# Patient Record
Sex: MALE | Race: WHITE | NOT HISPANIC OR LATINO | Employment: OTHER | URBAN - METROPOLITAN AREA
[De-identification: names, ages, dates, MRNs, and addresses within clinical notes are randomized per-mention and may not be internally consistent; named-entity substitution may affect disease eponyms.]

---

## 2017-01-20 ENCOUNTER — RECORDS - HEALTHEAST (OUTPATIENT)
Dept: LAB | Facility: CLINIC | Age: 82
End: 2017-01-20

## 2017-01-20 LAB
CHOLEST SERPL-MCNC: 153 MG/DL
FASTING STATUS PATIENT QL REPORTED: YES
HDLC SERPL-MCNC: 43 MG/DL
LDLC SERPL CALC-MCNC: 77 MG/DL
PSA SERPL-MCNC: 1.8 NG/ML (ref 0–6.5)
TRIGL SERPL-MCNC: 164 MG/DL

## 2018-07-27 ENCOUNTER — RECORDS - HEALTHEAST (OUTPATIENT)
Dept: LAB | Facility: CLINIC | Age: 83
End: 2018-07-27

## 2018-07-27 LAB
ALBUMIN SERPL-MCNC: 3.8 G/DL (ref 3.5–5)
ALP SERPL-CCNC: 51 U/L (ref 45–120)
ALT SERPL W P-5'-P-CCNC: 14 U/L (ref 0–45)
ANION GAP SERPL CALCULATED.3IONS-SCNC: 6 MMOL/L (ref 5–18)
AST SERPL W P-5'-P-CCNC: 17 U/L (ref 0–40)
BASOPHILS # BLD AUTO: 0 THOU/UL (ref 0–0.2)
BASOPHILS NFR BLD AUTO: 1 % (ref 0–2)
BILIRUB SERPL-MCNC: 0.6 MG/DL (ref 0–1)
BUN SERPL-MCNC: 32 MG/DL (ref 8–28)
CALCIUM SERPL-MCNC: 9.5 MG/DL (ref 8.5–10.5)
CHLORIDE BLD-SCNC: 112 MMOL/L (ref 98–107)
CO2 SERPL-SCNC: 24 MMOL/L (ref 22–31)
CREAT SERPL-MCNC: 1.29 MG/DL (ref 0.7–1.3)
EOSINOPHIL # BLD AUTO: 0.2 THOU/UL (ref 0–0.4)
EOSINOPHIL NFR BLD AUTO: 2 % (ref 0–6)
ERYTHROCYTE [DISTWIDTH] IN BLOOD BY AUTOMATED COUNT: 13.2 % (ref 11–14.5)
GFR SERPL CREATININE-BSD FRML MDRD: 53 ML/MIN/1.73M2
GLUCOSE BLD-MCNC: 121 MG/DL (ref 70–125)
HCT VFR BLD AUTO: 33.2 % (ref 40–54)
HGB BLD-MCNC: 11.3 G/DL (ref 14–18)
LYMPHOCYTES # BLD AUTO: 1.3 THOU/UL (ref 0.8–4.4)
LYMPHOCYTES NFR BLD AUTO: 21 % (ref 20–40)
MCH RBC QN AUTO: 32.9 PG (ref 27–34)
MCHC RBC AUTO-ENTMCNC: 34 G/DL (ref 32–36)
MCV RBC AUTO: 97 FL (ref 80–100)
MONOCYTES # BLD AUTO: 0.7 THOU/UL (ref 0–0.9)
MONOCYTES NFR BLD AUTO: 10 % (ref 2–10)
NEUTROPHILS # BLD AUTO: 4.3 THOU/UL (ref 2–7.7)
NEUTROPHILS NFR BLD AUTO: 67 % (ref 50–70)
PLATELET # BLD AUTO: 140 THOU/UL (ref 140–440)
PMV BLD AUTO: 11.9 FL (ref 8.5–12.5)
POTASSIUM BLD-SCNC: 5.3 MMOL/L (ref 3.5–5)
PROT SERPL-MCNC: 6.1 G/DL (ref 6–8)
RBC # BLD AUTO: 3.43 MILL/UL (ref 4.4–6.2)
SODIUM SERPL-SCNC: 142 MMOL/L (ref 136–145)
WBC: 6.5 THOU/UL (ref 4–11)

## 2018-07-29 LAB — HBA1C MFR BLD: 7 % (ref 4.2–6.1)

## 2018-07-31 LAB
CHOLEST SERPL-MCNC: 119 MG/DL
FASTING STATUS PATIENT QL REPORTED: YES
HDLC SERPL-MCNC: 40 MG/DL
LDLC SERPL CALC-MCNC: 57 MG/DL
TRIGL SERPL-MCNC: 108 MG/DL

## 2018-08-16 ENCOUNTER — RECORDS - HEALTHEAST (OUTPATIENT)
Dept: LAB | Facility: CLINIC | Age: 83
End: 2018-08-16

## 2018-08-17 LAB
ANION GAP SERPL CALCULATED.3IONS-SCNC: 14 MMOL/L (ref 5–18)
BUN SERPL-MCNC: 55 MG/DL (ref 8–28)
CALCIUM SERPL-MCNC: 9.4 MG/DL (ref 8.5–10.5)
CHLORIDE BLD-SCNC: 110 MMOL/L (ref 98–107)
CO2 SERPL-SCNC: 22 MMOL/L (ref 22–31)
CREAT SERPL-MCNC: 1.63 MG/DL (ref 0.7–1.3)
GFR SERPL CREATININE-BSD FRML MDRD: 40 ML/MIN/1.73M2
GLUCOSE BLD-MCNC: 211 MG/DL (ref 70–125)
POTASSIUM BLD-SCNC: 4.1 MMOL/L (ref 3.5–5)
SODIUM SERPL-SCNC: 146 MMOL/L (ref 136–145)

## 2018-08-20 ENCOUNTER — RECORDS - HEALTHEAST (OUTPATIENT)
Dept: LAB | Facility: CLINIC | Age: 83
End: 2018-08-20

## 2018-08-20 LAB
ANION GAP SERPL CALCULATED.3IONS-SCNC: 10 MMOL/L (ref 5–18)
BUN SERPL-MCNC: 37 MG/DL (ref 8–28)
CALCIUM SERPL-MCNC: 8.7 MG/DL (ref 8.5–10.5)
CHLORIDE BLD-SCNC: 110 MMOL/L (ref 98–107)
CO2 SERPL-SCNC: 20 MMOL/L (ref 22–31)
CREAT SERPL-MCNC: 1.23 MG/DL (ref 0.7–1.3)
ERYTHROCYTE [DISTWIDTH] IN BLOOD BY AUTOMATED COUNT: 16.1 % (ref 11–14.5)
GFR SERPL CREATININE-BSD FRML MDRD: 56 ML/MIN/1.73M2
GLUCOSE BLD-MCNC: 249 MG/DL (ref 70–125)
HCT VFR BLD AUTO: 27.4 % (ref 40–54)
HGB BLD-MCNC: 9.1 G/DL (ref 14–18)
MCH RBC QN AUTO: 32.2 PG (ref 27–34)
MCHC RBC AUTO-ENTMCNC: 33.2 G/DL (ref 32–36)
MCV RBC AUTO: 97 FL (ref 80–100)
PLATELET # BLD AUTO: 155 THOU/UL (ref 140–440)
PMV BLD AUTO: 12 FL (ref 8.5–12.5)
POTASSIUM BLD-SCNC: 4.6 MMOL/L (ref 3.5–5)
RBC # BLD AUTO: 2.83 MILL/UL (ref 4.4–6.2)
SODIUM SERPL-SCNC: 140 MMOL/L (ref 136–145)
WBC: 11.5 THOU/UL (ref 4–11)

## 2018-08-24 ENCOUNTER — RECORDS - HEALTHEAST (OUTPATIENT)
Dept: LAB | Facility: CLINIC | Age: 83
End: 2018-08-24

## 2018-08-24 LAB
ALBUMIN SERPL-MCNC: 3.3 G/DL (ref 3.5–5)
ALP SERPL-CCNC: 67 U/L (ref 45–120)
ALT SERPL W P-5'-P-CCNC: 31 U/L (ref 0–45)
ANION GAP SERPL CALCULATED.3IONS-SCNC: 9 MMOL/L (ref 5–18)
AST SERPL W P-5'-P-CCNC: 18 U/L (ref 0–40)
BASOPHILS # BLD AUTO: 0 THOU/UL (ref 0–0.2)
BASOPHILS NFR BLD AUTO: 0 % (ref 0–2)
BILIRUB SERPL-MCNC: 1.4 MG/DL (ref 0–1)
BUN SERPL-MCNC: 37 MG/DL (ref 8–28)
CALCIUM SERPL-MCNC: 8.9 MG/DL (ref 8.5–10.5)
CHLORIDE BLD-SCNC: 108 MMOL/L (ref 98–107)
CO2 SERPL-SCNC: 22 MMOL/L (ref 22–31)
CREAT SERPL-MCNC: 1.43 MG/DL (ref 0.7–1.3)
EOSINOPHIL # BLD AUTO: 0.2 THOU/UL (ref 0–0.4)
EOSINOPHIL NFR BLD AUTO: 2 % (ref 0–6)
ERYTHROCYTE [DISTWIDTH] IN BLOOD BY AUTOMATED COUNT: 17.2 % (ref 11–14.5)
GFR SERPL CREATININE-BSD FRML MDRD: 47 ML/MIN/1.73M2
GLUCOSE BLD-MCNC: 212 MG/DL (ref 70–125)
HCT VFR BLD AUTO: 28.7 % (ref 40–54)
HGB BLD-MCNC: 9.2 G/DL (ref 14–18)
LYMPHOCYTES # BLD AUTO: 1.4 THOU/UL (ref 0.8–4.4)
LYMPHOCYTES NFR BLD AUTO: 15 % (ref 20–40)
MCH RBC QN AUTO: 31.8 PG (ref 27–34)
MCHC RBC AUTO-ENTMCNC: 32.1 G/DL (ref 32–36)
MCV RBC AUTO: 99 FL (ref 80–100)
MONOCYTES # BLD AUTO: 0.8 THOU/UL (ref 0–0.9)
MONOCYTES NFR BLD AUTO: 9 % (ref 2–10)
NEUTROPHILS # BLD AUTO: 6.6 THOU/UL (ref 2–7.7)
NEUTROPHILS NFR BLD AUTO: 73 % (ref 50–70)
PLATELET # BLD AUTO: 173 THOU/UL (ref 140–440)
PMV BLD AUTO: 12 FL (ref 8.5–12.5)
POTASSIUM BLD-SCNC: 4.4 MMOL/L (ref 3.5–5)
PROT SERPL-MCNC: 5.5 G/DL (ref 6–8)
RBC # BLD AUTO: 2.89 MILL/UL (ref 4.4–6.2)
SODIUM SERPL-SCNC: 139 MMOL/L (ref 136–145)
WBC: 9.1 THOU/UL (ref 4–11)

## 2018-09-12 ENCOUNTER — RECORDS - HEALTHEAST (OUTPATIENT)
Dept: LAB | Facility: CLINIC | Age: 83
End: 2018-09-12

## 2018-09-12 LAB
ALBUMIN SERPL-MCNC: 3.3 G/DL (ref 3.5–5)
ALP SERPL-CCNC: 58 U/L (ref 45–120)
ALT SERPL W P-5'-P-CCNC: 14 U/L (ref 0–45)
ANION GAP SERPL CALCULATED.3IONS-SCNC: 10 MMOL/L (ref 5–18)
AST SERPL W P-5'-P-CCNC: 16 U/L (ref 0–40)
BILIRUB SERPL-MCNC: 0.7 MG/DL (ref 0–1)
BUN SERPL-MCNC: 29 MG/DL (ref 8–28)
CALCIUM SERPL-MCNC: 8.9 MG/DL (ref 8.5–10.5)
CHLORIDE BLD-SCNC: 109 MMOL/L (ref 98–107)
CO2 SERPL-SCNC: 21 MMOL/L (ref 22–31)
CREAT SERPL-MCNC: 1.17 MG/DL (ref 0.7–1.3)
GFR SERPL CREATININE-BSD FRML MDRD: 59 ML/MIN/1.73M2
GLUCOSE BLD-MCNC: 126 MG/DL (ref 70–125)
HGB BLD-MCNC: 10.1 G/DL (ref 14–18)
POTASSIUM BLD-SCNC: 4.2 MMOL/L (ref 3.5–5)
PROT SERPL-MCNC: 5.6 G/DL (ref 6–8)
SODIUM SERPL-SCNC: 140 MMOL/L (ref 136–145)

## 2018-12-26 ENCOUNTER — RECORDS - HEALTHEAST (OUTPATIENT)
Dept: LAB | Facility: CLINIC | Age: 83
End: 2018-12-26

## 2018-12-26 LAB
ALBUMIN SERPL-MCNC: 3.6 G/DL (ref 3.5–5)
ALP SERPL-CCNC: 48 U/L (ref 45–120)
ALT SERPL W P-5'-P-CCNC: 18 U/L (ref 0–45)
ANION GAP SERPL CALCULATED.3IONS-SCNC: 7 MMOL/L (ref 5–18)
AST SERPL W P-5'-P-CCNC: 18 U/L (ref 0–40)
BASOPHILS # BLD AUTO: 0 THOU/UL (ref 0–0.2)
BASOPHILS NFR BLD AUTO: 0 % (ref 0–2)
BILIRUB SERPL-MCNC: 0.6 MG/DL (ref 0–1)
BUN SERPL-MCNC: 38 MG/DL (ref 8–28)
CALCIUM SERPL-MCNC: 9.3 MG/DL (ref 8.5–10.5)
CHLORIDE BLD-SCNC: 111 MMOL/L (ref 98–107)
CO2 SERPL-SCNC: 23 MMOL/L (ref 22–31)
CREAT SERPL-MCNC: 1.34 MG/DL (ref 0.7–1.3)
EOSINOPHIL # BLD AUTO: 0.1 THOU/UL (ref 0–0.4)
EOSINOPHIL NFR BLD AUTO: 2 % (ref 0–6)
ERYTHROCYTE [DISTWIDTH] IN BLOOD BY AUTOMATED COUNT: 12.8 % (ref 11–14.5)
GFR SERPL CREATININE-BSD FRML MDRD: 51 ML/MIN/1.73M2
GLUCOSE BLD-MCNC: 189 MG/DL (ref 70–125)
HCT VFR BLD AUTO: 34.5 % (ref 40–54)
HGB BLD-MCNC: 10.8 G/DL (ref 14–18)
LYMPHOCYTES # BLD AUTO: 1.3 THOU/UL (ref 0.8–4.4)
LYMPHOCYTES NFR BLD AUTO: 23 % (ref 20–40)
MCH RBC QN AUTO: 31.3 PG (ref 27–34)
MCHC RBC AUTO-ENTMCNC: 31.3 G/DL (ref 32–36)
MCV RBC AUTO: 100 FL (ref 80–100)
MONOCYTES # BLD AUTO: 0.5 THOU/UL (ref 0–0.9)
MONOCYTES NFR BLD AUTO: 9 % (ref 2–10)
NEUTROPHILS # BLD AUTO: 3.8 THOU/UL (ref 2–7.7)
NEUTROPHILS NFR BLD AUTO: 66 % (ref 50–70)
PLATELET # BLD AUTO: 131 THOU/UL (ref 140–440)
PMV BLD AUTO: 12.3 FL (ref 8.5–12.5)
POTASSIUM BLD-SCNC: 4.9 MMOL/L (ref 3.5–5)
PROT SERPL-MCNC: 5.7 G/DL (ref 6–8)
RBC # BLD AUTO: 3.45 MILL/UL (ref 4.4–6.2)
SODIUM SERPL-SCNC: 141 MMOL/L (ref 136–145)
WBC: 5.9 THOU/UL (ref 4–11)

## 2018-12-27 LAB — HBA1C MFR BLD: 7.5 % (ref 4.2–6.1)

## 2019-01-03 ENCOUNTER — ANESTHESIA - HEALTHEAST (OUTPATIENT)
Dept: SURGERY | Facility: CLINIC | Age: 84
End: 2019-01-03

## 2019-01-03 ENCOUNTER — SURGERY - HEALTHEAST (OUTPATIENT)
Dept: SURGERY | Facility: CLINIC | Age: 84
End: 2019-01-03

## 2019-01-03 ASSESSMENT — MIFFLIN-ST. JEOR: SCORE: 1424.22

## 2019-09-10 ENCOUNTER — RECORDS - HEALTHEAST (OUTPATIENT)
Dept: LAB | Facility: CLINIC | Age: 84
End: 2019-09-10

## 2019-09-10 LAB
ANION GAP SERPL CALCULATED.3IONS-SCNC: 9 MMOL/L (ref 5–18)
BUN SERPL-MCNC: 41 MG/DL (ref 8–28)
CALCIUM SERPL-MCNC: 9.3 MG/DL (ref 8.5–10.5)
CHLORIDE BLD-SCNC: 109 MMOL/L (ref 98–107)
CO2 SERPL-SCNC: 26 MMOL/L (ref 22–31)
CREAT SERPL-MCNC: 1.5 MG/DL (ref 0.7–1.3)
GFR SERPL CREATININE-BSD FRML MDRD: 44 ML/MIN/1.73M2
GLUCOSE BLD-MCNC: 69 MG/DL (ref 70–125)
POTASSIUM BLD-SCNC: 4.5 MMOL/L (ref 3.5–5)
SODIUM SERPL-SCNC: 144 MMOL/L (ref 136–145)

## 2020-03-13 ENCOUNTER — RECORDS - HEALTHEAST (OUTPATIENT)
Dept: LAB | Facility: CLINIC | Age: 85
End: 2020-03-13

## 2020-03-13 LAB
ANION GAP SERPL CALCULATED.3IONS-SCNC: 6 MMOL/L (ref 5–18)
BUN SERPL-MCNC: 73 MG/DL (ref 8–28)
CALCIUM SERPL-MCNC: 8.9 MG/DL (ref 8.5–10.5)
CHLORIDE BLD-SCNC: 112 MMOL/L (ref 98–107)
CO2 SERPL-SCNC: 22 MMOL/L (ref 22–31)
CREAT SERPL-MCNC: 1.77 MG/DL (ref 0.7–1.3)
GFR SERPL CREATININE-BSD FRML MDRD: 36 ML/MIN/1.73M2
GLUCOSE BLD-MCNC: 52 MG/DL (ref 70–125)
POTASSIUM BLD-SCNC: 5 MMOL/L (ref 3.5–5)
SODIUM SERPL-SCNC: 140 MMOL/L (ref 136–145)

## 2020-05-11 ENCOUNTER — RECORDS - HEALTHEAST (OUTPATIENT)
Dept: LAB | Facility: CLINIC | Age: 85
End: 2020-05-11

## 2020-05-11 LAB
ANION GAP SERPL CALCULATED.3IONS-SCNC: 9 MMOL/L (ref 5–18)
BUN SERPL-MCNC: 41 MG/DL (ref 8–28)
CALCIUM SERPL-MCNC: 9.3 MG/DL (ref 8.5–10.5)
CHLORIDE BLD-SCNC: 108 MMOL/L (ref 98–107)
CHOLEST SERPL-MCNC: 112 MG/DL
CO2 SERPL-SCNC: 28 MMOL/L (ref 22–31)
CREAT SERPL-MCNC: 1.4 MG/DL (ref 0.7–1.3)
FASTING STATUS PATIENT QL REPORTED: NORMAL
GFR SERPL CREATININE-BSD FRML MDRD: 48 ML/MIN/1.73M2
GLUCOSE BLD-MCNC: 43 MG/DL (ref 70–125)
HDLC SERPL-MCNC: 43 MG/DL
LDLC SERPL CALC-MCNC: 48 MG/DL
POTASSIUM BLD-SCNC: 4.3 MMOL/L (ref 3.5–5)
SODIUM SERPL-SCNC: 145 MMOL/L (ref 136–145)
TRIGL SERPL-MCNC: 103 MG/DL

## 2020-08-12 ENCOUNTER — RECORDS - HEALTHEAST (OUTPATIENT)
Dept: LAB | Facility: CLINIC | Age: 85
End: 2020-08-12

## 2020-08-12 LAB
ANION GAP SERPL CALCULATED.3IONS-SCNC: 9 MMOL/L (ref 5–18)
BUN SERPL-MCNC: 55 MG/DL (ref 8–28)
CALCIUM SERPL-MCNC: 8.9 MG/DL (ref 8.5–10.5)
CHLORIDE BLD-SCNC: 106 MMOL/L (ref 98–107)
CO2 SERPL-SCNC: 27 MMOL/L (ref 22–31)
CREAT SERPL-MCNC: 1.74 MG/DL (ref 0.7–1.3)
ERYTHROCYTE [DISTWIDTH] IN BLOOD BY AUTOMATED COUNT: 13.2 % (ref 11–14.5)
GFR SERPL CREATININE-BSD FRML MDRD: 37 ML/MIN/1.73M2
GLUCOSE BLD-MCNC: 205 MG/DL (ref 70–125)
HCT VFR BLD AUTO: 34 % (ref 40–54)
HGB BLD-MCNC: 11 G/DL (ref 14–18)
MCH RBC QN AUTO: 32.4 PG (ref 27–34)
MCHC RBC AUTO-ENTMCNC: 32.4 G/DL (ref 32–36)
MCV RBC AUTO: 100 FL (ref 80–100)
PLATELET # BLD AUTO: 146 THOU/UL (ref 140–440)
PMV BLD AUTO: 11.6 FL (ref 8.5–12.5)
POTASSIUM BLD-SCNC: 5.2 MMOL/L (ref 3.5–5)
RBC # BLD AUTO: 3.4 MILL/UL (ref 4.4–6.2)
SODIUM SERPL-SCNC: 142 MMOL/L (ref 136–145)
TSH SERPL DL<=0.005 MIU/L-ACNC: 1.17 UIU/ML (ref 0.3–5)
WBC: 7 THOU/UL (ref 4–11)

## 2021-02-02 ENCOUNTER — RECORDS - HEALTHEAST (OUTPATIENT)
Dept: LAB | Facility: CLINIC | Age: 86
End: 2021-02-02

## 2021-02-02 LAB
ALBUMIN SERPL-MCNC: 3.7 G/DL (ref 3.5–5)
ALP SERPL-CCNC: 50 U/L (ref 45–120)
ALT SERPL W P-5'-P-CCNC: 31 U/L (ref 0–45)
ANION GAP SERPL CALCULATED.3IONS-SCNC: 10 MMOL/L (ref 5–18)
AST SERPL W P-5'-P-CCNC: 34 U/L (ref 0–40)
BILIRUB SERPL-MCNC: 0.6 MG/DL (ref 0–1)
BUN SERPL-MCNC: 53 MG/DL (ref 8–28)
CALCIUM SERPL-MCNC: 9.1 MG/DL (ref 8.5–10.5)
CHLORIDE BLD-SCNC: 107 MMOL/L (ref 98–107)
CO2 SERPL-SCNC: 25 MMOL/L (ref 22–31)
CREAT SERPL-MCNC: 1.38 MG/DL (ref 0.7–1.3)
GFR SERPL CREATININE-BSD FRML MDRD: 49 ML/MIN/1.73M2
GLUCOSE BLD-MCNC: 111 MG/DL (ref 70–125)
POTASSIUM BLD-SCNC: 4.7 MMOL/L (ref 3.5–5)
PROT SERPL-MCNC: 6 G/DL (ref 6–8)
PSA SERPL-MCNC: 1.4 NG/ML (ref 0–6.5)
SODIUM SERPL-SCNC: 142 MMOL/L (ref 136–145)
TSH SERPL DL<=0.005 MIU/L-ACNC: 1.33 UIU/ML (ref 0.3–5)

## 2021-05-29 ENCOUNTER — RECORDS - HEALTHEAST (OUTPATIENT)
Dept: ADMINISTRATIVE | Facility: CLINIC | Age: 86
End: 2021-05-29

## 2021-05-30 ENCOUNTER — RECORDS - HEALTHEAST (OUTPATIENT)
Dept: ADMINISTRATIVE | Facility: CLINIC | Age: 86
End: 2021-05-30

## 2021-06-02 ENCOUNTER — RECORDS - HEALTHEAST (OUTPATIENT)
Dept: LAB | Facility: CLINIC | Age: 86
End: 2021-06-02

## 2021-06-02 ENCOUNTER — RECORDS - HEALTHEAST (OUTPATIENT)
Dept: ADMINISTRATIVE | Facility: CLINIC | Age: 86
End: 2021-06-02

## 2021-06-02 VITALS — WEIGHT: 180 LBS | HEIGHT: 66 IN | BODY MASS INDEX: 28.93 KG/M2

## 2021-06-02 LAB
ANION GAP SERPL CALCULATED.3IONS-SCNC: 9 MMOL/L (ref 5–18)
BUN SERPL-MCNC: 63 MG/DL (ref 8–28)
CALCIUM SERPL-MCNC: 9.4 MG/DL (ref 8.5–10.5)
CHLORIDE BLD-SCNC: 105 MMOL/L (ref 98–107)
CHOLEST SERPL-MCNC: 98 MG/DL
CO2 SERPL-SCNC: 27 MMOL/L (ref 22–31)
CREAT SERPL-MCNC: 1.47 MG/DL (ref 0.7–1.3)
FASTING STATUS PATIENT QL REPORTED: ABNORMAL
GFR SERPL CREATININE-BSD FRML MDRD: 45 ML/MIN/1.73M2
GLUCOSE BLD-MCNC: 150 MG/DL (ref 70–125)
HDLC SERPL-MCNC: 39 MG/DL
IRON SERPL-MCNC: 95 UG/DL (ref 42–175)
LDLC SERPL CALC-MCNC: 42 MG/DL
POTASSIUM BLD-SCNC: 4.5 MMOL/L (ref 3.5–5)
RETICS # AUTO: 0.07 MILL/UL (ref 0.01–0.11)
RETICS/RBC NFR AUTO: 1.88 % (ref 0.8–2.7)
SODIUM SERPL-SCNC: 141 MMOL/L (ref 136–145)
TRIGL SERPL-MCNC: 86 MG/DL

## 2021-06-04 ENCOUNTER — RECORDS - HEALTHEAST (OUTPATIENT)
Dept: LAB | Facility: CLINIC | Age: 86
End: 2021-06-04

## 2021-06-04 LAB
FERRITIN SERPL-MCNC: 169 NG/ML (ref 27–300)
VIT B12 SERPL-MCNC: 777 PG/ML (ref 213–816)

## 2021-06-22 NOTE — ANESTHESIA CARE TRANSFER NOTE
Last vitals:   Vitals:    01/03/19 1322   BP: 117/86   Pulse: 88   Resp: 15   Temp: 36.4  C (97.6  F)   SpO2: 93%     Patient's level of consciousness is drowsy  Spontaneous respirations: yes  Maintains airway independently: yes  Dentition unchanged: yes  Oropharynx: oropharynx clear of all foreign objects    QCDR Measures:  ASA# 20 - Surgical Safety Checklist: WHO surgical safety checklist completed prior to induction    PQRS# 430 - Adult PONV Prevention: NA - Not adult patient, not GA or 3 or more risk factors NOT present  ASA# 8 - Peds PONV Prevention: NA - Not pediatric patient, not GA or 2 or more risk factors NOT present  PQRS# 424 - Casandra-op Temp Management: 4559F - At least one body temp DOCUMENTED => 35.5C or 95.9F within required timeframe  PQRS# 426 - PACU Transfer Protocol: - Transfer of care checklist used  ASA# 14 - Acute Post-op Pain: ASA14B - Patient did NOT experience pain >= 7 out of 10

## 2021-06-22 NOTE — ANESTHESIA POSTPROCEDURE EVALUATION
Patient: Barak Berry  COLONOSCOPY  Anesthesia type: MAC    Patient location: Phase II Recovery  Last vitals:   Vitals:    01/03/19 1400   BP: 161/69   Pulse: 84   Resp: 16   Temp:    SpO2: 93%     Post vital signs: stable  Level of consciousness: awake and responds to simple questions  Post-anesthesia pain: pain controlled  Post-anesthesia nausea and vomiting: no  Pulmonary: unassisted, return to baseline  Cardiovascular: stable and blood pressure at baseline  Hydration: adequate  Anesthetic events: no    QCDR Measures:  ASA# 11 - Casandra-op Cardiac Arrest: ASA11B - Patient did NOT experience unanticipated cardiac arrest  ASA# 12 - Casandra-op Mortality Rate: ASA12B - Patient did NOT die  ASA# 13 - PACU Re-Intubation Rate: ASA13B - Patient did NOT require a new airway mgmt  ASA# 10 - Composite Anes Safety: ASA10A - No serious adverse event    Additional Notes:

## 2021-06-22 NOTE — ANESTHESIA PREPROCEDURE EVALUATION
Anesthesia Evaluation      Patient summary reviewed   History of anesthetic complications     Airway   Mallampati: III  Neck ROM: full   Pulmonary - normal exam                          Cardiovascular - normal exam  (+) hypertension, CAD, ,      Neuro/Psych      Endo/Other    (+) diabetes mellitus,      GI/Hepatic/Renal    (+) GERD,   chronic renal disease,           Dental - normal exam                        Anesthesia Plan  Planned anesthetic: MAC    ASA 2     Anesthetic plan and risks discussed with: patient    Post-op plan: routine recovery

## 2021-08-20 ENCOUNTER — LAB REQUISITION (OUTPATIENT)
Dept: LAB | Facility: CLINIC | Age: 86
End: 2021-08-20
Payer: MEDICARE

## 2021-08-20 DIAGNOSIS — Z01.818 ENCOUNTER FOR OTHER PREPROCEDURAL EXAMINATION: ICD-10-CM

## 2021-08-20 DIAGNOSIS — I12.9 HYPERTENSIVE CHRONIC KIDNEY DISEASE WITH STAGE 1 THROUGH STAGE 4 CHRONIC KIDNEY DISEASE, OR UNSPECIFIED CHRONIC KIDNEY DISEASE: ICD-10-CM

## 2021-08-20 PROCEDURE — 80048 BASIC METABOLIC PNL TOTAL CA: CPT | Mod: ORL | Performed by: FAMILY MEDICINE

## 2021-08-20 PROCEDURE — U0005 INFEC AGEN DETEC AMPLI PROBE: HCPCS | Mod: ORL | Performed by: FAMILY MEDICINE

## 2021-08-21 LAB
ANION GAP SERPL CALCULATED.3IONS-SCNC: 8 MMOL/L (ref 5–18)
BUN SERPL-MCNC: 55 MG/DL (ref 8–28)
CALCIUM SERPL-MCNC: 9.2 MG/DL (ref 8.5–10.5)
CHLORIDE BLD-SCNC: 106 MMOL/L (ref 98–107)
CO2 SERPL-SCNC: 26 MMOL/L (ref 22–31)
CREAT SERPL-MCNC: 1.6 MG/DL (ref 0.7–1.3)
GFR SERPL CREATININE-BSD FRML MDRD: 38 ML/MIN/1.73M2
GLUCOSE BLD-MCNC: 190 MG/DL (ref 70–125)
POTASSIUM BLD-SCNC: 4.1 MMOL/L (ref 3.5–5)
SODIUM SERPL-SCNC: 140 MMOL/L (ref 136–145)

## 2021-08-22 LAB — SARS-COV-2 RNA RESP QL NAA+PROBE: NEGATIVE

## 2022-01-10 ENCOUNTER — LAB REQUISITION (OUTPATIENT)
Dept: LAB | Facility: CLINIC | Age: 87
End: 2022-01-10
Payer: MEDICARE

## 2022-01-10 DIAGNOSIS — E11.22 TYPE 2 DIABETES MELLITUS WITH DIABETIC CHRONIC KIDNEY DISEASE (H): ICD-10-CM

## 2022-01-10 DIAGNOSIS — R63.4 ABNORMAL WEIGHT LOSS: ICD-10-CM

## 2022-01-10 LAB
ALBUMIN SERPL-MCNC: 4.1 G/DL (ref 3.5–5)
ALP SERPL-CCNC: 55 U/L (ref 45–120)
ALT SERPL W P-5'-P-CCNC: 38 U/L (ref 0–45)
ANION GAP SERPL CALCULATED.3IONS-SCNC: 12 MMOL/L (ref 5–18)
AST SERPL W P-5'-P-CCNC: 35 U/L (ref 0–40)
BILIRUB SERPL-MCNC: 0.7 MG/DL (ref 0–1)
BUN SERPL-MCNC: 62 MG/DL (ref 8–28)
CALCIUM SERPL-MCNC: 9.6 MG/DL (ref 8.5–10.5)
CHLORIDE BLD-SCNC: 105 MMOL/L (ref 98–107)
CO2 SERPL-SCNC: 24 MMOL/L (ref 22–31)
CREAT SERPL-MCNC: 1.69 MG/DL (ref 0.7–1.3)
GFR SERPL CREATININE-BSD FRML MDRD: 38 ML/MIN/1.73M2
GLUCOSE BLD-MCNC: 259 MG/DL (ref 70–125)
IRON SERPL-MCNC: 100 UG/DL (ref 42–175)
POTASSIUM BLD-SCNC: 4.8 MMOL/L (ref 3.5–5)
PROT SERPL-MCNC: 6.5 G/DL (ref 6–8)
SODIUM SERPL-SCNC: 141 MMOL/L (ref 136–145)
TSH SERPL DL<=0.005 MIU/L-ACNC: 1.37 UIU/ML (ref 0.3–5)

## 2022-01-10 PROCEDURE — 80053 COMPREHEN METABOLIC PANEL: CPT | Mod: ORL | Performed by: FAMILY MEDICINE

## 2022-01-10 PROCEDURE — 83540 ASSAY OF IRON: CPT | Mod: ORL | Performed by: FAMILY MEDICINE

## 2022-01-10 PROCEDURE — 84443 ASSAY THYROID STIM HORMONE: CPT | Mod: ORL | Performed by: FAMILY MEDICINE

## 2022-04-13 ENCOUNTER — LAB REQUISITION (OUTPATIENT)
Dept: LAB | Facility: CLINIC | Age: 87
End: 2022-04-13
Payer: MEDICARE

## 2022-04-13 DIAGNOSIS — E11.9 TYPE 2 DIABETES MELLITUS WITHOUT COMPLICATIONS (H): ICD-10-CM

## 2022-04-13 DIAGNOSIS — E78.5 HYPERLIPIDEMIA, UNSPECIFIED: ICD-10-CM

## 2022-04-13 LAB
ANION GAP SERPL CALCULATED.3IONS-SCNC: 13 MMOL/L (ref 5–18)
BUN SERPL-MCNC: 68 MG/DL (ref 8–28)
CALCIUM SERPL-MCNC: 10 MG/DL (ref 8.5–10.5)
CHLORIDE BLD-SCNC: 105 MMOL/L (ref 98–107)
CHOLEST SERPL-MCNC: 158 MG/DL
CO2 SERPL-SCNC: 23 MMOL/L (ref 22–31)
CREAT SERPL-MCNC: 1.44 MG/DL (ref 0.7–1.3)
FASTING STATUS PATIENT QL REPORTED: NORMAL
GFR SERPL CREATININE-BSD FRML MDRD: 46 ML/MIN/1.73M2
GLUCOSE BLD-MCNC: 194 MG/DL (ref 70–125)
HDLC SERPL-MCNC: 54 MG/DL
LDLC SERPL CALC-MCNC: 78 MG/DL
POTASSIUM BLD-SCNC: 4.9 MMOL/L (ref 3.5–5)
SODIUM SERPL-SCNC: 141 MMOL/L (ref 136–145)
TRIGL SERPL-MCNC: 129 MG/DL

## 2022-04-13 PROCEDURE — 80048 BASIC METABOLIC PNL TOTAL CA: CPT | Mod: ORL | Performed by: FAMILY MEDICINE

## 2022-04-13 PROCEDURE — 80061 LIPID PANEL: CPT | Mod: ORL | Performed by: FAMILY MEDICINE

## 2023-01-01 ENCOUNTER — NURSING HOME VISIT (OUTPATIENT)
Dept: GERIATRICS | Facility: CLINIC | Age: 88
End: 2023-01-01
Payer: MEDICARE

## 2023-01-01 ENCOUNTER — TELEPHONE (OUTPATIENT)
Dept: GERIATRICS | Facility: CLINIC | Age: 88
End: 2023-01-01
Payer: MEDICARE

## 2023-01-01 ENCOUNTER — LAB REQUISITION (OUTPATIENT)
Dept: LAB | Facility: CLINIC | Age: 88
End: 2023-01-01
Payer: MEDICARE

## 2023-01-01 ENCOUNTER — TELEPHONE (OUTPATIENT)
Dept: GERIATRICS | Facility: CLINIC | Age: 88
End: 2023-01-01

## 2023-01-01 ENCOUNTER — MEDICAL CORRESPONDENCE (OUTPATIENT)
Dept: HEALTH INFORMATION MANAGEMENT | Facility: CLINIC | Age: 88
End: 2023-01-01

## 2023-01-01 ENCOUNTER — DOCUMENTATION ONLY (OUTPATIENT)
Dept: OTHER | Facility: CLINIC | Age: 88
End: 2023-01-01
Payer: MEDICARE

## 2023-01-01 ENCOUNTER — PATIENT OUTREACH (OUTPATIENT)
Dept: CARE COORDINATION | Facility: CLINIC | Age: 88
End: 2023-01-01
Payer: MEDICARE

## 2023-01-01 ENCOUNTER — ASSISTED LIVING VISIT (OUTPATIENT)
Dept: GERIATRICS | Facility: CLINIC | Age: 88
End: 2023-01-01
Payer: MEDICARE

## 2023-01-01 ENCOUNTER — APPOINTMENT (OUTPATIENT)
Dept: GENERAL RADIOLOGY | Facility: CLINIC | Age: 88
End: 2023-01-01
Attending: EMERGENCY MEDICINE
Payer: MEDICARE

## 2023-01-01 ENCOUNTER — HOSPITAL ENCOUNTER (OUTPATIENT)
Facility: CLINIC | Age: 88
Setting detail: OBSERVATION
Discharge: NURSING, REHAB OR LONG-TERM CARE | End: 2023-06-13
Attending: EMERGENCY MEDICINE | Admitting: EMERGENCY MEDICINE
Payer: MEDICARE

## 2023-01-01 VITALS
DIASTOLIC BLOOD PRESSURE: 56 MMHG | HEART RATE: 70 BPM | SYSTOLIC BLOOD PRESSURE: 157 MMHG | TEMPERATURE: 98 F | BODY MASS INDEX: 21.6 KG/M2 | RESPIRATION RATE: 18 BRPM | HEIGHT: 67 IN | OXYGEN SATURATION: 97 % | WEIGHT: 137.6 LBS

## 2023-01-01 VITALS
SYSTOLIC BLOOD PRESSURE: 150 MMHG | HEART RATE: 79 BPM | DIASTOLIC BLOOD PRESSURE: 64 MMHG | OXYGEN SATURATION: 98 % | BODY MASS INDEX: 20.94 KG/M2 | HEIGHT: 67 IN | WEIGHT: 133.4 LBS | RESPIRATION RATE: 18 BRPM | TEMPERATURE: 98 F

## 2023-01-01 VITALS
SYSTOLIC BLOOD PRESSURE: 125 MMHG | RESPIRATION RATE: 18 BRPM | DIASTOLIC BLOOD PRESSURE: 53 MMHG | WEIGHT: 135.6 LBS | TEMPERATURE: 98.7 F | OXYGEN SATURATION: 94 % | BODY MASS INDEX: 21.24 KG/M2 | HEART RATE: 78 BPM

## 2023-01-01 VITALS
BODY MASS INDEX: 21.22 KG/M2 | OXYGEN SATURATION: 98 % | TEMPERATURE: 98.2 F | RESPIRATION RATE: 18 BRPM | WEIGHT: 135.2 LBS | DIASTOLIC BLOOD PRESSURE: 68 MMHG | SYSTOLIC BLOOD PRESSURE: 103 MMHG | HEIGHT: 67 IN | HEART RATE: 72 BPM

## 2023-01-01 VITALS
TEMPERATURE: 98.1 F | RESPIRATION RATE: 16 BRPM | BODY MASS INDEX: 21.31 KG/M2 | OXYGEN SATURATION: 96 % | SYSTOLIC BLOOD PRESSURE: 161 MMHG | WEIGHT: 135.8 LBS | HEART RATE: 80 BPM | DIASTOLIC BLOOD PRESSURE: 55 MMHG | HEIGHT: 67 IN

## 2023-01-01 VITALS
DIASTOLIC BLOOD PRESSURE: 60 MMHG | HEIGHT: 67 IN | HEART RATE: 83 BPM | BODY MASS INDEX: 20.84 KG/M2 | SYSTOLIC BLOOD PRESSURE: 149 MMHG | RESPIRATION RATE: 16 BRPM | OXYGEN SATURATION: 94 % | TEMPERATURE: 98.6 F | WEIGHT: 132.8 LBS

## 2023-01-01 VITALS
SYSTOLIC BLOOD PRESSURE: 167 MMHG | DIASTOLIC BLOOD PRESSURE: 50 MMHG | HEART RATE: 65 BPM | BODY MASS INDEX: 21.27 KG/M2 | RESPIRATION RATE: 16 BRPM | OXYGEN SATURATION: 95 % | WEIGHT: 135.8 LBS | TEMPERATURE: 98.9 F

## 2023-01-01 VITALS
HEIGHT: 66 IN | TEMPERATURE: 98.2 F | OXYGEN SATURATION: 94 % | WEIGHT: 133.8 LBS | RESPIRATION RATE: 20 BRPM | HEART RATE: 64 BPM | SYSTOLIC BLOOD PRESSURE: 176 MMHG | BODY MASS INDEX: 21.5 KG/M2 | DIASTOLIC BLOOD PRESSURE: 57 MMHG

## 2023-01-01 VITALS
OXYGEN SATURATION: 94 % | TEMPERATURE: 97.4 F | HEIGHT: 66 IN | HEART RATE: 82 BPM | RESPIRATION RATE: 18 BRPM | DIASTOLIC BLOOD PRESSURE: 64 MMHG | BODY MASS INDEX: 22.14 KG/M2 | WEIGHT: 137.8 LBS | SYSTOLIC BLOOD PRESSURE: 148 MMHG

## 2023-01-01 VITALS
OXYGEN SATURATION: 94 % | WEIGHT: 134.8 LBS | RESPIRATION RATE: 16 BRPM | BODY MASS INDEX: 21.16 KG/M2 | HEIGHT: 67 IN | SYSTOLIC BLOOD PRESSURE: 166 MMHG | DIASTOLIC BLOOD PRESSURE: 58 MMHG | TEMPERATURE: 98.2 F | HEART RATE: 71 BPM

## 2023-01-01 VITALS
SYSTOLIC BLOOD PRESSURE: 147 MMHG | RESPIRATION RATE: 18 BRPM | WEIGHT: 130.8 LBS | BODY MASS INDEX: 21.02 KG/M2 | HEART RATE: 74 BPM | OXYGEN SATURATION: 94 % | DIASTOLIC BLOOD PRESSURE: 63 MMHG | HEIGHT: 66 IN | TEMPERATURE: 97.4 F

## 2023-01-01 VITALS
HEART RATE: 60 BPM | SYSTOLIC BLOOD PRESSURE: 181 MMHG | WEIGHT: 142.5 LBS | RESPIRATION RATE: 18 BRPM | OXYGEN SATURATION: 96 % | TEMPERATURE: 97.9 F | HEIGHT: 66 IN | DIASTOLIC BLOOD PRESSURE: 54 MMHG | BODY MASS INDEX: 22.9 KG/M2

## 2023-01-01 VITALS
WEIGHT: 135.2 LBS | HEART RATE: 72 BPM | OXYGEN SATURATION: 98 % | TEMPERATURE: 98.2 F | HEIGHT: 67 IN | SYSTOLIC BLOOD PRESSURE: 103 MMHG | DIASTOLIC BLOOD PRESSURE: 68 MMHG | BODY MASS INDEX: 21.22 KG/M2 | RESPIRATION RATE: 18 BRPM

## 2023-01-01 VITALS
TEMPERATURE: 98.6 F | SYSTOLIC BLOOD PRESSURE: 166 MMHG | HEART RATE: 77 BPM | RESPIRATION RATE: 18 BRPM | WEIGHT: 134.4 LBS | OXYGEN SATURATION: 94 % | DIASTOLIC BLOOD PRESSURE: 59 MMHG | BODY MASS INDEX: 21.05 KG/M2

## 2023-01-01 DIAGNOSIS — E46 PROTEIN-CALORIE MALNUTRITION, UNSPECIFIED SEVERITY (H): ICD-10-CM

## 2023-01-01 DIAGNOSIS — E11.9 TYPE 2 DIABETES MELLITUS WITHOUT COMPLICATIONS (H): ICD-10-CM

## 2023-01-01 DIAGNOSIS — S32.020D CLOSED COMPRESSION FRACTURE OF L2 LUMBAR VERTEBRA WITH ROUTINE HEALING, SUBSEQUENT ENCOUNTER: ICD-10-CM

## 2023-01-01 DIAGNOSIS — I25.2 HISTORY OF NON-ST ELEVATION MYOCARDIAL INFARCTION (NSTEMI): Primary | ICD-10-CM

## 2023-01-01 DIAGNOSIS — R73.9 HYPERGLYCEMIA: ICD-10-CM

## 2023-01-01 DIAGNOSIS — M80.08XD AGE-RELATED OSTEOPOROSIS WITH CURRENT PATHOLOGICAL FRACTURE, VERTEBRA(E), SUBSEQUENT ENCOUNTER FOR FRACTURE WITH ROUTINE HEALING: ICD-10-CM

## 2023-01-01 DIAGNOSIS — Z79.899 POLYPHARMACY: ICD-10-CM

## 2023-01-01 DIAGNOSIS — D69.6 THROMBOCYTOPENIA (H): ICD-10-CM

## 2023-01-01 DIAGNOSIS — Z86.19 HISTORY OF CLOSTRIDIOIDES DIFFICILE COLITIS: ICD-10-CM

## 2023-01-01 DIAGNOSIS — Z79.4 TYPE 2 DIABETES MELLITUS WITH HYPERGLYCEMIA, WITH LONG-TERM CURRENT USE OF INSULIN (H): ICD-10-CM

## 2023-01-01 DIAGNOSIS — E16.2 HYPOGLYCEMIA: ICD-10-CM

## 2023-01-01 DIAGNOSIS — I49.5 SSS (SICK SINUS SYNDROME) (H): ICD-10-CM

## 2023-01-01 DIAGNOSIS — E11.649 DIABETIC HYPOGLYCEMIA (H): ICD-10-CM

## 2023-01-01 DIAGNOSIS — I50.32 CHRONIC HEART FAILURE WITH PRESERVED EJECTION FRACTION (HFPEF) (H): ICD-10-CM

## 2023-01-01 DIAGNOSIS — R53.81 PHYSICAL DECONDITIONING: ICD-10-CM

## 2023-01-01 DIAGNOSIS — Z79.4 TYPE 2 DIABETES MELLITUS WITH HYPERGLYCEMIA, WITH LONG-TERM CURRENT USE OF INSULIN (H): Primary | ICD-10-CM

## 2023-01-01 DIAGNOSIS — Z87.19 HISTORY OF CHOLECYSTITIS: ICD-10-CM

## 2023-01-01 DIAGNOSIS — E11.9 TYPE 2 DIABETES MELLITUS WITHOUT COMPLICATION, WITHOUT LONG-TERM CURRENT USE OF INSULIN (H): ICD-10-CM

## 2023-01-01 DIAGNOSIS — I12.9 MALIGNANT HYPERTENSIVE KIDNEY DISEASE WITH CHRONIC KIDNEY DISEASE STAGE I THROUGH STAGE IV, OR UNSPECIFIED(403.00): ICD-10-CM

## 2023-01-01 DIAGNOSIS — D53.9 MACROCYTIC ANEMIA: ICD-10-CM

## 2023-01-01 DIAGNOSIS — E11.65 TYPE 2 DIABETES MELLITUS WITH HYPERGLYCEMIA, WITH LONG-TERM CURRENT USE OF INSULIN (H): Primary | ICD-10-CM

## 2023-01-01 DIAGNOSIS — A04.72 C. DIFFICILE COLITIS: ICD-10-CM

## 2023-01-01 DIAGNOSIS — I10 ESSENTIAL (PRIMARY) HYPERTENSION: ICD-10-CM

## 2023-01-01 DIAGNOSIS — E11.22 TYPE 2 DIABETES MELLITUS WITH CHRONIC KIDNEY DISEASE, WITHOUT LONG-TERM CURRENT USE OF INSULIN, UNSPECIFIED CKD STAGE (H): ICD-10-CM

## 2023-01-01 DIAGNOSIS — E83.42 HYPOMAGNESEMIA: ICD-10-CM

## 2023-01-01 DIAGNOSIS — Z95.1 POSTSURGICAL AORTOCORONARY BYPASS STATUS: ICD-10-CM

## 2023-01-01 DIAGNOSIS — I73.9 PAD (PERIPHERAL ARTERY DISEASE) (H): Primary | ICD-10-CM

## 2023-01-01 DIAGNOSIS — E87.5 HYPERKALEMIA: ICD-10-CM

## 2023-01-01 DIAGNOSIS — E61.2 MAGNESIUM DEFICIENCY: ICD-10-CM

## 2023-01-01 DIAGNOSIS — R54 FRAILTY: ICD-10-CM

## 2023-01-01 DIAGNOSIS — J34.89 RHINORRHEA: ICD-10-CM

## 2023-01-01 DIAGNOSIS — D64.9 ANEMIA, UNSPECIFIED: ICD-10-CM

## 2023-01-01 DIAGNOSIS — R52 PAIN: ICD-10-CM

## 2023-01-01 DIAGNOSIS — E44.0 MODERATE PROTEIN-CALORIE MALNUTRITION (H): ICD-10-CM

## 2023-01-01 DIAGNOSIS — D64.9 NORMOCYTIC ANEMIA: ICD-10-CM

## 2023-01-01 DIAGNOSIS — N18.30 STAGE 3 CHRONIC KIDNEY DISEASE, UNSPECIFIED WHETHER STAGE 3A OR 3B CKD (H): ICD-10-CM

## 2023-01-01 DIAGNOSIS — Z95.0 CARDIAC PACEMAKER IN SITU: ICD-10-CM

## 2023-01-01 DIAGNOSIS — S01.01XD LACERATION OF SCALP WITHOUT FOREIGN BODY, SUBSEQUENT ENCOUNTER: ICD-10-CM

## 2023-01-01 DIAGNOSIS — I25.810 CORONARY ARTERY DISEASE INVOLVING CORONARY BYPASS GRAFT OF NATIVE HEART WITHOUT ANGINA PECTORIS: ICD-10-CM

## 2023-01-01 DIAGNOSIS — R73.9 HYPERGLYCEMIA, UNSPECIFIED: ICD-10-CM

## 2023-01-01 DIAGNOSIS — I73.9 PAD (PERIPHERAL ARTERY DISEASE) (H): ICD-10-CM

## 2023-01-01 DIAGNOSIS — N18.31 STAGE 3A CHRONIC KIDNEY DISEASE (H): ICD-10-CM

## 2023-01-01 DIAGNOSIS — I25.2 HISTORY OF NON-ST ELEVATION MYOCARDIAL INFARCTION (NSTEMI): ICD-10-CM

## 2023-01-01 DIAGNOSIS — R63.4 WEIGHT LOSS: ICD-10-CM

## 2023-01-01 DIAGNOSIS — R79.89 OTHER SPECIFIED ABNORMAL FINDINGS OF BLOOD CHEMISTRY: ICD-10-CM

## 2023-01-01 DIAGNOSIS — R45.89 DEPRESSED MOOD: ICD-10-CM

## 2023-01-01 DIAGNOSIS — E11.65 TYPE 2 DIABETES MELLITUS WITH HYPERGLYCEMIA, WITHOUT LONG-TERM CURRENT USE OF INSULIN (H): ICD-10-CM
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
## 2023-01-01 DIAGNOSIS — S32.020D CLOSED COMPRESSION FRACTURE OF L2 LUMBAR VERTEBRA WITH ROUTINE HEALING, SUBSEQUENT ENCOUNTER: Primary | ICD-10-CM

## 2023-01-01 DIAGNOSIS — M81.0 AGE-RELATED OSTEOPOROSIS WITHOUT CURRENT PATHOLOGICAL FRACTURE: ICD-10-CM

## 2023-01-01 DIAGNOSIS — R79.89 ELEVATED TROPONIN: ICD-10-CM

## 2023-01-01 DIAGNOSIS — Z95.1 S/P CABG (CORONARY ARTERY BYPASS GRAFT): ICD-10-CM

## 2023-01-01 DIAGNOSIS — R54 FRAILTY: Primary | ICD-10-CM

## 2023-01-01 DIAGNOSIS — R19.5 LOOSE STOOLS: ICD-10-CM

## 2023-01-01 DIAGNOSIS — R79.89 ELEVATED TROPONIN LEVEL: ICD-10-CM
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
## 2023-01-01 DIAGNOSIS — I25.10 ATHEROSCLEROSIS OF NATIVE CORONARY ARTERY OF NATIVE HEART WITHOUT ANGINA PECTORIS: ICD-10-CM

## 2023-01-01 DIAGNOSIS — M80.00XD AGE-RELATED OSTEOPOROSIS WITH CURRENT PATHOLOGICAL FRACTURE WITH ROUTINE HEALING, SUBSEQUENT ENCOUNTER: ICD-10-CM

## 2023-01-01 DIAGNOSIS — E11.65 TYPE 2 DIABETES MELLITUS WITH HYPERGLYCEMIA, WITH LONG-TERM CURRENT USE OF INSULIN (H): ICD-10-CM

## 2023-01-01 DIAGNOSIS — I21.4 NSTEMI (NON-ST ELEVATED MYOCARDIAL INFARCTION) (H): ICD-10-CM

## 2023-01-01 DIAGNOSIS — Z87.19 HISTORY OF ACUTE CHOLECYSTITIS: ICD-10-CM

## 2023-01-01 LAB
ALBUMIN SERPL BCG-MCNC: 3.3 G/DL (ref 3.5–5.2)
ALBUMIN SERPL BCG-MCNC: 3.8 G/DL (ref 3.5–5.2)
ALBUMIN SERPL BCG-MCNC: 3.9 G/DL (ref 3.5–5.2)
ALBUMIN SERPL BCG-MCNC: 3.9 G/DL (ref 3.5–5.2)
ALBUMIN UR-MCNC: 30 MG/DL
ALP SERPL-CCNC: 102 U/L (ref 40–150)
ALP SERPL-CCNC: 114 U/L (ref 40–129)
ALP SERPL-CCNC: 117 U/L (ref 40–150)
ALP SERPL-CCNC: 121 U/L (ref 40–129)
ALT SERPL W P-5'-P-CCNC: 17 U/L (ref 10–50)
ALT SERPL W P-5'-P-CCNC: 19 U/L (ref 10–50)
ALT SERPL W P-5'-P-CCNC: 47 U/L (ref 0–70)
ALT SERPL W P-5'-P-CCNC: 53 U/L (ref 0–70)
ANION GAP SERPL CALCULATED.3IONS-SCNC: 10 MMOL/L (ref 7–15)
ANION GAP SERPL CALCULATED.3IONS-SCNC: 11 MMOL/L (ref 7–15)
ANION GAP SERPL CALCULATED.3IONS-SCNC: 12 MMOL/L (ref 7–15)
ANION GAP SERPL CALCULATED.3IONS-SCNC: 13 MMOL/L (ref 7–15)
ANION GAP SERPL CALCULATED.3IONS-SCNC: 13 MMOL/L (ref 7–15)
ANION GAP SERPL CALCULATED.3IONS-SCNC: 15 MMOL/L (ref 7–15)
ANION GAP SERPL CALCULATED.3IONS-SCNC: 9 MMOL/L (ref 7–15)
APPEARANCE UR: CLEAR
AST SERPL W P-5'-P-CCNC: 34 U/L (ref 10–50)
AST SERPL W P-5'-P-CCNC: 38 U/L (ref 10–50)
AST SERPL W P-5'-P-CCNC: 52 U/L (ref 0–45)
AST SERPL W P-5'-P-CCNC: 53 U/L (ref 0–45)
ATRIAL RATE - MUSE: 63 BPM
BASOPHILS # BLD AUTO: 0 10E3/UL (ref 0–0.2)
BASOPHILS NFR BLD AUTO: 0 %
BILIRUB DIRECT SERPL-MCNC: <0.2 MG/DL (ref 0–0.3)
BILIRUB SERPL-MCNC: 0.2 MG/DL
BILIRUB SERPL-MCNC: 0.3 MG/DL
BILIRUB UR QL STRIP: NEGATIVE
BUN SERPL-MCNC: 44.2 MG/DL (ref 8–23)
BUN SERPL-MCNC: 46.4 MG/DL (ref 8–23)
BUN SERPL-MCNC: 52.6 MG/DL (ref 8–23)
BUN SERPL-MCNC: 54.3 MG/DL (ref 8–23)
BUN SERPL-MCNC: 55.9 MG/DL (ref 8–23)
BUN SERPL-MCNC: 56.7 MG/DL (ref 8–23)
BUN SERPL-MCNC: 59.3 MG/DL (ref 8–23)
BUN SERPL-MCNC: 62.8 MG/DL (ref 8–23)
BUN SERPL-MCNC: 66.5 MG/DL (ref 8–23)
BUN SERPL-MCNC: 88 MG/DL (ref 8–23)
CALCIUM SERPL-MCNC: 8.3 MG/DL (ref 8.2–9.6)
CALCIUM SERPL-MCNC: 8.5 MG/DL (ref 8.2–9.6)
CALCIUM SERPL-MCNC: 8.7 MG/DL (ref 8.2–9.6)
CALCIUM SERPL-MCNC: 8.8 MG/DL (ref 8.2–9.6)
CALCIUM SERPL-MCNC: 8.8 MG/DL (ref 8.2–9.6)
CALCIUM SERPL-MCNC: 8.9 MG/DL (ref 8.2–9.6)
CALCIUM SERPL-MCNC: 9 MG/DL (ref 8.2–9.6)
CALCIUM SERPL-MCNC: 9.2 MG/DL (ref 8.2–9.6)
CALCIUM SERPL-MCNC: 9.3 MG/DL (ref 8.2–9.6)
CALCIUM SERPL-MCNC: 9.4 MG/DL (ref 8.2–9.6)
CHLORIDE SERPL-SCNC: 103 MMOL/L (ref 98–107)
CHLORIDE SERPL-SCNC: 104 MMOL/L (ref 98–107)
CHLORIDE SERPL-SCNC: 104 MMOL/L (ref 98–107)
CHLORIDE SERPL-SCNC: 105 MMOL/L (ref 98–107)
CHLORIDE SERPL-SCNC: 106 MMOL/L (ref 98–107)
CHLORIDE SERPL-SCNC: 107 MMOL/L (ref 98–107)
CHLORIDE SERPL-SCNC: 109 MMOL/L (ref 98–107)
CHLORIDE SERPL-SCNC: 99 MMOL/L (ref 98–107)
CK SERPL-CCNC: 85 U/L (ref 39–308)
COLOR UR AUTO: ABNORMAL
CREAT SERPL-MCNC: 0.94 MG/DL (ref 0.67–1.17)
CREAT SERPL-MCNC: 1.18 MG/DL (ref 0.67–1.17)
CREAT SERPL-MCNC: 1.19 MG/DL (ref 0.67–1.17)
CREAT SERPL-MCNC: 1.2 MG/DL (ref 0.67–1.17)
CREAT SERPL-MCNC: 1.21 MG/DL (ref 0.67–1.17)
CREAT SERPL-MCNC: 1.26 MG/DL (ref 0.67–1.17)
CREAT SERPL-MCNC: 1.37 MG/DL (ref 0.67–1.17)
CREAT SERPL-MCNC: 1.38 MG/DL (ref 0.67–1.17)
CREAT SERPL-MCNC: 1.51 MG/DL (ref 0.67–1.17)
CREAT SERPL-MCNC: 2 MG/DL (ref 0.67–1.17)
DEPRECATED CALCIDIOL+CALCIFEROL SERPL-MC: 32 UG/L (ref 20–75)
DEPRECATED HCO3 PLAS-SCNC: 19 MMOL/L (ref 22–29)
DEPRECATED HCO3 PLAS-SCNC: 21 MMOL/L (ref 22–29)
DEPRECATED HCO3 PLAS-SCNC: 22 MMOL/L (ref 22–29)
DEPRECATED HCO3 PLAS-SCNC: 22 MMOL/L (ref 22–29)
DEPRECATED HCO3 PLAS-SCNC: 23 MMOL/L (ref 22–29)
DEPRECATED HCO3 PLAS-SCNC: 23 MMOL/L (ref 22–29)
DEPRECATED HCO3 PLAS-SCNC: 24 MMOL/L (ref 22–29)
DEPRECATED HCO3 PLAS-SCNC: 25 MMOL/L (ref 22–29)
DIASTOLIC BLOOD PRESSURE - MUSE: NORMAL MMHG
EGFRCR SERPLBLD CKD-EPI 2021: 31 ML/MIN/1.73M2
EGFRCR SERPLBLD CKD-EPI 2021: 43 ML/MIN/1.73M2
EGFRCR SERPLBLD CKD-EPI 2021: 48 ML/MIN/1.73M2
EGFRCR SERPLBLD CKD-EPI 2021: 49 ML/MIN/1.73M2
EOSINOPHIL # BLD AUTO: 0 10E3/UL (ref 0–0.7)
EOSINOPHIL # BLD AUTO: 0 10E3/UL (ref 0–0.7)
EOSINOPHIL # BLD AUTO: 0.1 10E3/UL (ref 0–0.7)
EOSINOPHIL NFR BLD AUTO: 0 %
EOSINOPHIL NFR BLD AUTO: 1 %
EOSINOPHIL NFR BLD AUTO: 2 %
ERYTHROCYTE [DISTWIDTH] IN BLOOD BY AUTOMATED COUNT: 14.4 % (ref 10–15)
ERYTHROCYTE [DISTWIDTH] IN BLOOD BY AUTOMATED COUNT: 14.4 % (ref 10–15)
ERYTHROCYTE [DISTWIDTH] IN BLOOD BY AUTOMATED COUNT: 14.7 % (ref 10–15)
ERYTHROCYTE [DISTWIDTH] IN BLOOD BY AUTOMATED COUNT: 16.4 % (ref 10–15)
ERYTHROCYTE [DISTWIDTH] IN BLOOD BY AUTOMATED COUNT: 17.6 % (ref 10–15)
GFR SERPL CREATININE-BSD FRML MDRD: 54 ML/MIN/1.73M2
GFR SERPL CREATININE-BSD FRML MDRD: 57 ML/MIN/1.73M2
GFR SERPL CREATININE-BSD FRML MDRD: 57 ML/MIN/1.73M2
GFR SERPL CREATININE-BSD FRML MDRD: 58 ML/MIN/1.73M2
GFR SERPL CREATININE-BSD FRML MDRD: 58 ML/MIN/1.73M2
GFR SERPL CREATININE-BSD FRML MDRD: 77 ML/MIN/1.73M2
GLUCOSE BLDC GLUCOMTR-MCNC: 105 MG/DL (ref 70–99)
GLUCOSE BLDC GLUCOMTR-MCNC: 107 MG/DL (ref 70–99)
GLUCOSE BLDC GLUCOMTR-MCNC: 129 MG/DL (ref 70–99)
GLUCOSE BLDC GLUCOMTR-MCNC: 141 MG/DL (ref 70–99)
GLUCOSE BLDC GLUCOMTR-MCNC: 146 MG/DL (ref 70–99)
GLUCOSE BLDC GLUCOMTR-MCNC: 147 MG/DL (ref 70–99)
GLUCOSE BLDC GLUCOMTR-MCNC: 149 MG/DL (ref 70–99)
GLUCOSE BLDC GLUCOMTR-MCNC: 178 MG/DL (ref 70–99)
GLUCOSE BLDC GLUCOMTR-MCNC: 223 MG/DL (ref 70–99)
GLUCOSE BLDC GLUCOMTR-MCNC: 226 MG/DL (ref 70–99)
GLUCOSE BLDC GLUCOMTR-MCNC: 234 MG/DL (ref 70–99)
GLUCOSE BLDC GLUCOMTR-MCNC: 250 MG/DL (ref 70–99)
GLUCOSE BLDC GLUCOMTR-MCNC: 257 MG/DL (ref 70–99)
GLUCOSE BLDC GLUCOMTR-MCNC: 76 MG/DL (ref 70–99)
GLUCOSE BLDC GLUCOMTR-MCNC: 91 MG/DL (ref 70–99)
GLUCOSE BLDC GLUCOMTR-MCNC: 94 MG/DL (ref 70–99)
GLUCOSE SERPL-MCNC: 129 MG/DL (ref 70–99)
GLUCOSE SERPL-MCNC: 132 MG/DL (ref 70–99)
GLUCOSE SERPL-MCNC: 149 MG/DL (ref 70–99)
GLUCOSE SERPL-MCNC: 172 MG/DL (ref 70–99)
GLUCOSE SERPL-MCNC: 202 MG/DL (ref 70–99)
GLUCOSE SERPL-MCNC: 232 MG/DL (ref 70–99)
GLUCOSE SERPL-MCNC: 239 MG/DL (ref 70–99)
GLUCOSE SERPL-MCNC: 271 MG/DL (ref 70–99)
GLUCOSE SERPL-MCNC: 456 MG/DL (ref 70–99)
GLUCOSE SERPL-MCNC: 52 MG/DL (ref 70–99)
GLUCOSE UR STRIP-MCNC: NEGATIVE MG/DL
HBA1C MFR BLD: 9.9 %
HCT VFR BLD AUTO: 29.9 % (ref 40–53)
HCT VFR BLD AUTO: 31.2 % (ref 40–53)
HCT VFR BLD AUTO: 31.5 % (ref 40–53)
HCT VFR BLD AUTO: 33.5 % (ref 40–53)
HCT VFR BLD AUTO: 34.3 % (ref 40–53)
HGB BLD-MCNC: 10.5 G/DL (ref 13.3–17.7)
HGB BLD-MCNC: 10.7 G/DL (ref 13.3–17.7)
HGB BLD-MCNC: 9.4 G/DL (ref 13.3–17.7)
HGB BLD-MCNC: 9.5 G/DL (ref 13.3–17.7)
HGB BLD-MCNC: 9.6 G/DL (ref 13.3–17.7)
HGB UR QL STRIP: ABNORMAL
HOLD SPECIMEN: NORMAL
IMM GRANULOCYTES # BLD: 0 10E3/UL
IMM GRANULOCYTES NFR BLD: 0 %
INTERPRETATION ECG - MUSE: NORMAL
KETONES UR STRIP-MCNC: NEGATIVE MG/DL
LACTATE SERPL-SCNC: 1 MMOL/L (ref 0.7–2)
LEUKOCYTE ESTERASE UR QL STRIP: NEGATIVE
LYMPHOCYTES # BLD AUTO: 0.6 10E3/UL (ref 0.8–5.3)
LYMPHOCYTES # BLD AUTO: 0.9 10E3/UL (ref 0.8–5.3)
LYMPHOCYTES # BLD AUTO: 1.6 10E3/UL (ref 0.8–5.3)
LYMPHOCYTES NFR BLD AUTO: 17 %
LYMPHOCYTES NFR BLD AUTO: 22 %
LYMPHOCYTES NFR BLD AUTO: 35 %
MAGNESIUM SERPL-MCNC: 1.1 MG/DL (ref 1.7–2.3)
MAGNESIUM SERPL-MCNC: 1.6 MG/DL (ref 1.7–2.3)
MAGNESIUM SERPL-MCNC: 1.7 MG/DL (ref 1.7–2.3)
MAGNESIUM SERPL-MCNC: 2.1 MG/DL (ref 1.7–2.3)
MCH RBC QN AUTO: 26.7 PG (ref 26.5–33)
MCH RBC QN AUTO: 26.8 PG (ref 26.5–33)
MCH RBC QN AUTO: 31.5 PG (ref 26.5–33)
MCH RBC QN AUTO: 32 PG (ref 26.5–33)
MCH RBC QN AUTO: 32 PG (ref 26.5–33)
MCHC RBC AUTO-ENTMCNC: 29.8 G/DL (ref 31.5–36.5)
MCHC RBC AUTO-ENTMCNC: 30.4 G/DL (ref 31.5–36.5)
MCHC RBC AUTO-ENTMCNC: 30.6 G/DL (ref 31.5–36.5)
MCHC RBC AUTO-ENTMCNC: 31.9 G/DL (ref 31.5–36.5)
MCHC RBC AUTO-ENTMCNC: 32.1 G/DL (ref 31.5–36.5)
MCV RBC AUTO: 100 FL (ref 78–100)
MCV RBC AUTO: 100 FL (ref 78–100)
MCV RBC AUTO: 103 FL (ref 78–100)
MCV RBC AUTO: 88 FL (ref 78–100)
MCV RBC AUTO: 90 FL (ref 78–100)
MONOCYTES # BLD AUTO: 0.2 10E3/UL (ref 0–1.3)
MONOCYTES # BLD AUTO: 0.3 10E3/UL (ref 0–1.3)
MONOCYTES # BLD AUTO: 0.5 10E3/UL (ref 0–1.3)
MONOCYTES NFR BLD AUTO: 11 %
MONOCYTES NFR BLD AUTO: 5 %
MONOCYTES NFR BLD AUTO: 8 %
NEUTROPHILS # BLD AUTO: 2.4 10E3/UL (ref 1.6–8.3)
NEUTROPHILS # BLD AUTO: 2.9 10E3/UL (ref 1.6–8.3)
NEUTROPHILS # BLD AUTO: 3 10E3/UL (ref 1.6–8.3)
NEUTROPHILS NFR BLD AUTO: 52 %
NEUTROPHILS NFR BLD AUTO: 70 %
NEUTROPHILS NFR BLD AUTO: 77 %
NITRATE UR QL: NEGATIVE
NRBC # BLD AUTO: 0 10E3/UL
NRBC BLD AUTO-RTO: 0 /100
P AXIS - MUSE: 47 DEGREES
PATH REPORT.COMMENTS IMP SPEC: NORMAL
PATH REPORT.FINAL DX SPEC: NORMAL
PATH REPORT.MICROSCOPIC SPEC OTHER STN: NORMAL
PATH REPORT.MICROSCOPIC SPEC OTHER STN: NORMAL
PATH REPORT.RELEVANT HX SPEC: NORMAL
PH UR STRIP: 6 [PH] (ref 5–7)
PLATELET # BLD AUTO: 105 10E3/UL (ref 150–450)
PLATELET # BLD AUTO: 110 10E3/UL (ref 150–450)
PLATELET # BLD AUTO: 120 10E3/UL (ref 150–450)
PLATELET # BLD AUTO: 122 10E3/UL (ref 150–450)
PLATELET # BLD AUTO: 142 10E3/UL (ref 150–450)
POTASSIUM SERPL-SCNC: 3.7 MMOL/L (ref 3.4–5.3)
POTASSIUM SERPL-SCNC: 4.2 MMOL/L (ref 3.4–5.3)
POTASSIUM SERPL-SCNC: 4.4 MMOL/L (ref 3.4–5.3)
POTASSIUM SERPL-SCNC: 4.7 MMOL/L (ref 3.4–5.3)
POTASSIUM SERPL-SCNC: 4.9 MMOL/L (ref 3.4–5.3)
POTASSIUM SERPL-SCNC: 5 MMOL/L (ref 3.4–5.3)
POTASSIUM SERPL-SCNC: 5 MMOL/L (ref 3.4–5.3)
POTASSIUM SERPL-SCNC: 5.1 MMOL/L (ref 3.4–5.3)
POTASSIUM SERPL-SCNC: 5.3 MMOL/L (ref 3.4–5.3)
POTASSIUM SERPL-SCNC: 5.8 MMOL/L (ref 3.4–5.3)
PR INTERVAL - MUSE: 228 MS
PROT SERPL-MCNC: 5.7 G/DL (ref 6.4–8.3)
PROT SERPL-MCNC: 6.2 G/DL (ref 6.4–8.3)
PROT SERPL-MCNC: 6.3 G/DL (ref 6.4–8.3)
PROT SERPL-MCNC: 6.3 G/DL (ref 6.4–8.3)
PTH-INTACT SERPL-MCNC: 12 PG/ML (ref 15–65)
QRS DURATION - MUSE: 136 MS
QT - MUSE: 520 MS
QTC - MUSE: 532 MS
R AXIS - MUSE: 22 DEGREES
RBC # BLD AUTO: 3 10E6/UL (ref 4.4–5.9)
RBC # BLD AUTO: 3.33 10E6/UL (ref 4.4–5.9)
RBC # BLD AUTO: 3.34 10E6/UL (ref 4.4–5.9)
RBC # BLD AUTO: 3.52 10E6/UL (ref 4.4–5.9)
RBC # BLD AUTO: 3.54 10E6/UL (ref 4.4–5.9)
RBC URINE: 95 /HPF
RETICS # AUTO: 0.08 10E6/UL (ref 0.03–0.1)
RETICS/RBC NFR AUTO: 2.5 % (ref 0.5–2)
SARS-COV-2 RNA RESP QL NAA+PROBE: NEGATIVE
SODIUM SERPL-SCNC: 133 MMOL/L (ref 135–145)
SODIUM SERPL-SCNC: 137 MMOL/L (ref 136–145)
SODIUM SERPL-SCNC: 138 MMOL/L (ref 136–145)
SODIUM SERPL-SCNC: 139 MMOL/L (ref 136–145)
SODIUM SERPL-SCNC: 140 MMOL/L (ref 136–145)
SODIUM SERPL-SCNC: 140 MMOL/L (ref 136–145)
SODIUM SERPL-SCNC: 141 MMOL/L (ref 135–145)
SODIUM SERPL-SCNC: 141 MMOL/L (ref 136–145)
SP GR UR STRIP: 1.01 (ref 1–1.03)
SYSTOLIC BLOOD PRESSURE - MUSE: NORMAL MMHG
T AXIS - MUSE: 38 DEGREES
TRANSITIONAL EPI: <1 /HPF
TROPONIN T SERPL HS-MCNC: 29 NG/L
TROPONIN T SERPL HS-MCNC: 32 NG/L
TROPONIN T SERPL HS-MCNC: 34 NG/L
TROPONIN T SERPL HS-MCNC: 35 NG/L
TROPONIN T SERPL HS-MCNC: 39 NG/L
TSH SERPL DL<=0.005 MIU/L-ACNC: 2.18 UIU/ML (ref 0.3–4.2)
UROBILINOGEN UR STRIP-MCNC: NORMAL MG/DL
VENTRICULAR RATE- MUSE: 63 BPM
WBC # BLD AUTO: 3.8 10E3/UL (ref 4–11)
WBC # BLD AUTO: 4.3 10E3/UL (ref 4–11)
WBC # BLD AUTO: 4.3 10E3/UL (ref 4–11)
WBC # BLD AUTO: 4.5 10E3/UL (ref 4–11)
WBC # BLD AUTO: 4.6 10E3/UL (ref 4–11)
WBC URINE: <1 /HPF

## 2023-01-01 PROCEDURE — 99239 HOSP IP/OBS DSCHRG MGMT >30: CPT | Mod: GC | Performed by: STUDENT IN AN ORGANIZED HEALTH CARE EDUCATION/TRAINING PROGRAM

## 2023-01-01 PROCEDURE — P9604 ONE-WAY ALLOW PRORATED TRIP: HCPCS | Mod: ORL | Performed by: FAMILY MEDICINE

## 2023-01-01 PROCEDURE — 83735 ASSAY OF MAGNESIUM: CPT | Mod: ORL

## 2023-01-01 PROCEDURE — 250N000012 HC RX MED GY IP 250 OP 636 PS 637

## 2023-01-01 PROCEDURE — 99309 SBSQ NF CARE MODERATE MDM 30: CPT | Performed by: FAMILY MEDICINE

## 2023-01-01 PROCEDURE — 99310 SBSQ NF CARE HIGH MDM 45: CPT | Performed by: FAMILY MEDICINE

## 2023-01-01 PROCEDURE — 36415 COLL VENOUS BLD VENIPUNCTURE: CPT | Performed by: EMERGENCY MEDICINE

## 2023-01-01 PROCEDURE — P9603 ONE-WAY ALLOW PRORATED MILES: HCPCS | Mod: ORL

## 2023-01-01 PROCEDURE — 82306 VITAMIN D 25 HYDROXY: CPT | Mod: ORL | Performed by: FAMILY MEDICINE

## 2023-01-01 PROCEDURE — P9604 ONE-WAY ALLOW PRORATED TRIP: HCPCS | Mod: ORL

## 2023-01-01 PROCEDURE — 36415 COLL VENOUS BLD VENIPUNCTURE: CPT | Mod: ORL | Performed by: FAMILY MEDICINE

## 2023-01-01 PROCEDURE — 99309 SBSQ NF CARE MODERATE MDM 30: CPT

## 2023-01-01 PROCEDURE — 93010 ELECTROCARDIOGRAM REPORT: CPT | Performed by: EMERGENCY MEDICINE

## 2023-01-01 PROCEDURE — 99349 HOME/RES VST EST MOD MDM 40: CPT | Performed by: NURSE PRACTITIONER

## 2023-01-01 PROCEDURE — 80048 BASIC METABOLIC PNL TOTAL CA: CPT

## 2023-01-01 PROCEDURE — 99349 HOME/RES VST EST MOD MDM 40: CPT | Performed by: FAMILY MEDICINE

## 2023-01-01 PROCEDURE — P9603 ONE-WAY ALLOW PRORATED MILES: HCPCS | Mod: ORL | Performed by: FAMILY MEDICINE

## 2023-01-01 PROCEDURE — 99223 1ST HOSP IP/OBS HIGH 75: CPT | Mod: AI | Performed by: STUDENT IN AN ORGANIZED HEALTH CARE EDUCATION/TRAINING PROGRAM

## 2023-01-01 PROCEDURE — 36415 COLL VENOUS BLD VENIPUNCTURE: CPT | Mod: ORL

## 2023-01-01 PROCEDURE — 250N000013 HC RX MED GY IP 250 OP 250 PS 637: Performed by: EMERGENCY MEDICINE

## 2023-01-01 PROCEDURE — 82248 BILIRUBIN DIRECT: CPT

## 2023-01-01 PROCEDURE — 80048 BASIC METABOLIC PNL TOTAL CA: CPT | Mod: ORL | Performed by: FAMILY MEDICINE

## 2023-01-01 PROCEDURE — 250N000013 HC RX MED GY IP 250 OP 250 PS 637

## 2023-01-01 PROCEDURE — 99285 EMERGENCY DEPT VISIT HI MDM: CPT | Mod: 25 | Performed by: EMERGENCY MEDICINE

## 2023-01-01 PROCEDURE — 85060 BLOOD SMEAR INTERPRETATION: CPT | Performed by: PATHOLOGY

## 2023-01-01 PROCEDURE — 84484 ASSAY OF TROPONIN QUANT: CPT | Mod: 91

## 2023-01-01 PROCEDURE — G0378 HOSPITAL OBSERVATION PER HR: HCPCS

## 2023-01-01 PROCEDURE — 99309 SBSQ NF CARE MODERATE MDM 30: CPT | Performed by: PHYSICIAN ASSISTANT

## 2023-01-01 PROCEDURE — 85027 COMPLETE CBC AUTOMATED: CPT | Mod: ORL

## 2023-01-01 PROCEDURE — 80048 BASIC METABOLIC PNL TOTAL CA: CPT | Mod: ORL

## 2023-01-01 PROCEDURE — 84443 ASSAY THYROID STIM HORMONE: CPT

## 2023-01-01 PROCEDURE — 82550 ASSAY OF CK (CPK): CPT

## 2023-01-01 PROCEDURE — 80053 COMPREHEN METABOLIC PANEL: CPT | Mod: ORL | Performed by: FAMILY MEDICINE

## 2023-01-01 PROCEDURE — 71046 X-RAY EXAM CHEST 2 VIEWS: CPT | Mod: 26 | Performed by: RADIOLOGY

## 2023-01-01 PROCEDURE — 83735 ASSAY OF MAGNESIUM: CPT

## 2023-01-01 PROCEDURE — 82310 ASSAY OF CALCIUM: CPT | Performed by: EMERGENCY MEDICINE

## 2023-01-01 PROCEDURE — 84484 ASSAY OF TROPONIN QUANT: CPT | Performed by: EMERGENCY MEDICINE

## 2023-01-01 PROCEDURE — 85025 COMPLETE CBC W/AUTO DIFF WBC: CPT | Performed by: EMERGENCY MEDICINE

## 2023-01-01 PROCEDURE — 36415 COLL VENOUS BLD VENIPUNCTURE: CPT

## 2023-01-01 PROCEDURE — 84484 ASSAY OF TROPONIN QUANT: CPT

## 2023-01-01 PROCEDURE — 93005 ELECTROCARDIOGRAM TRACING: CPT | Performed by: EMERGENCY MEDICINE

## 2023-01-01 PROCEDURE — 96360 HYDRATION IV INFUSION INIT: CPT | Performed by: EMERGENCY MEDICINE

## 2023-01-01 PROCEDURE — 87635 SARS-COV-2 COVID-19 AMP PRB: CPT | Performed by: STUDENT IN AN ORGANIZED HEALTH CARE EDUCATION/TRAINING PROGRAM

## 2023-01-01 PROCEDURE — 83970 ASSAY OF PARATHORMONE: CPT | Mod: ORL | Performed by: FAMILY MEDICINE

## 2023-01-01 PROCEDURE — 96361 HYDRATE IV INFUSION ADD-ON: CPT

## 2023-01-01 PROCEDURE — 99310 SBSQ NF CARE HIGH MDM 45: CPT | Performed by: NURSE PRACTITIONER

## 2023-01-01 PROCEDURE — 99310 SBSQ NF CARE HIGH MDM 45: CPT | Performed by: PHYSICIAN ASSISTANT

## 2023-01-01 PROCEDURE — 81001 URINALYSIS AUTO W/SCOPE: CPT | Performed by: EMERGENCY MEDICINE

## 2023-01-01 PROCEDURE — 96361 HYDRATE IV INFUSION ADD-ON: CPT | Performed by: EMERGENCY MEDICINE

## 2023-01-01 PROCEDURE — 82962 GLUCOSE BLOOD TEST: CPT

## 2023-01-01 PROCEDURE — 71046 X-RAY EXAM CHEST 2 VIEWS: CPT

## 2023-01-01 PROCEDURE — 83735 ASSAY OF MAGNESIUM: CPT | Mod: ORL | Performed by: FAMILY MEDICINE

## 2023-01-01 PROCEDURE — 99284 EMERGENCY DEPT VISIT MOD MDM: CPT | Mod: 25 | Performed by: EMERGENCY MEDICINE

## 2023-01-01 PROCEDURE — 85025 COMPLETE CBC W/AUTO DIFF WBC: CPT | Mod: ORL | Performed by: FAMILY MEDICINE

## 2023-01-01 PROCEDURE — 258N000001 HC RX 258: Performed by: EMERGENCY MEDICINE

## 2023-01-01 PROCEDURE — 83605 ASSAY OF LACTIC ACID: CPT | Performed by: EMERGENCY MEDICINE

## 2023-01-01 PROCEDURE — 85025 COMPLETE CBC W/AUTO DIFF WBC: CPT

## 2023-01-01 PROCEDURE — 85045 AUTOMATED RETICULOCYTE COUNT: CPT

## 2023-01-01 PROCEDURE — 83036 HEMOGLOBIN GLYCOSYLATED A1C: CPT

## 2023-01-01 PROCEDURE — 85027 COMPLETE CBC AUTOMATED: CPT

## 2023-01-01 RX ORDER — BISACODYL 10 MG
10 SUPPOSITORY, RECTAL RECTAL DAILY PRN
COMMUNITY
Start: 2023-01-01

## 2023-01-01 RX ORDER — HYDRALAZINE HYDROCHLORIDE 25 MG/1
12.5 TABLET, FILM COATED ORAL 2 TIMES DAILY
COMMUNITY
Start: 2023-01-01

## 2023-01-01 RX ORDER — LIDOCAINE 4 G/G
1 PATCH TOPICAL EVERY 24 HOURS
COMMUNITY
End: 2023-01-01

## 2023-01-01 RX ORDER — PANTOPRAZOLE SODIUM 20 MG/1
20 TABLET, DELAYED RELEASE ORAL EVERY OTHER DAY
COMMUNITY

## 2023-01-01 RX ORDER — LIDOCAINE 4 G/G
1 PATCH TOPICAL EVERY 24 HOURS
Status: CANCELLED
Start: 2023-01-01

## 2023-01-01 RX ORDER — CLOPIDOGREL BISULFATE 75 MG/1
75 TABLET ORAL EVERY MORNING
Status: DISCONTINUED | OUTPATIENT
Start: 2023-01-01 | End: 2023-01-01 | Stop reason: HOSPADM

## 2023-01-01 RX ORDER — DEXTROSE MONOHYDRATE 25 G/50ML
25-50 INJECTION, SOLUTION INTRAVENOUS
Status: DISCONTINUED | OUTPATIENT
Start: 2023-01-01 | End: 2023-01-01 | Stop reason: HOSPADM

## 2023-01-01 RX ORDER — PANTOPRAZOLE SODIUM 40 MG/1
40 TABLET, DELAYED RELEASE ORAL DAILY
COMMUNITY
Start: 2023-01-01 | End: 2023-01-01

## 2023-01-01 RX ORDER — INSULIN GLARGINE 100 [IU]/ML
5 INJECTION, SOLUTION SUBCUTANEOUS EVERY MORNING
COMMUNITY
End: 2023-01-01 | Stop reason: DRUGHIGH

## 2023-01-01 RX ORDER — VITS A,C,E/LUTEIN/MINERALS 300MCG-200
1 TABLET ORAL DAILY
Status: DISCONTINUED | OUTPATIENT
Start: 2023-01-01 | End: 2023-01-01 | Stop reason: HOSPADM

## 2023-01-01 RX ORDER — POLYETHYLENE GLYCOL 3350 17 G/17G
17 POWDER, FOR SOLUTION ORAL DAILY PRN
COMMUNITY

## 2023-01-01 RX ORDER — CITALOPRAM HYDROBROMIDE 10 MG/1
20 TABLET ORAL DAILY
Status: DISCONTINUED | OUTPATIENT
Start: 2023-01-01 | End: 2023-01-01 | Stop reason: HOSPADM

## 2023-01-01 RX ORDER — DEXTROSE MONOHYDRATE 100 MG/ML
INJECTION, SOLUTION INTRAVENOUS CONTINUOUS
Status: DISCONTINUED | OUTPATIENT
Start: 2023-01-01 | End: 2023-01-01

## 2023-01-01 RX ORDER — ALENDRONATE SODIUM 70 MG/1
70 TABLET ORAL
COMMUNITY

## 2023-01-01 RX ORDER — ACETAMINOPHEN 500 MG
1000 TABLET ORAL 3 TIMES DAILY
COMMUNITY
End: 2023-01-01

## 2023-01-01 RX ORDER — HYDROMORPHONE HYDROCHLORIDE 2 MG/1
2 TABLET ORAL EVERY 8 HOURS PRN
Qty: 30 TABLET | Refills: 0 | Status: SHIPPED | OUTPATIENT
Start: 2023-01-01 | End: 2023-01-01

## 2023-01-01 RX ORDER — HYDROMORPHONE HYDROCHLORIDE 2 MG/1
2 TABLET ORAL EVERY 8 HOURS PRN
Status: DISCONTINUED | OUTPATIENT
Start: 2023-01-01 | End: 2023-01-01 | Stop reason: HOSPADM

## 2023-01-01 RX ORDER — LIDOCAINE 40 MG/G
CREAM TOPICAL
Status: DISCONTINUED | OUTPATIENT
Start: 2023-01-01 | End: 2023-01-01 | Stop reason: HOSPADM

## 2023-01-01 RX ORDER — NALOXONE HYDROCHLORIDE 0.4 MG/ML
0.4 INJECTION, SOLUTION INTRAMUSCULAR; INTRAVENOUS; SUBCUTANEOUS
Status: DISCONTINUED | OUTPATIENT
Start: 2023-01-01 | End: 2023-01-01 | Stop reason: HOSPADM

## 2023-01-01 RX ORDER — NALOXONE HYDROCHLORIDE 0.4 MG/ML
0.2 INJECTION, SOLUTION INTRAMUSCULAR; INTRAVENOUS; SUBCUTANEOUS
Status: DISCONTINUED | OUTPATIENT
Start: 2023-01-01 | End: 2023-01-01 | Stop reason: HOSPADM

## 2023-01-01 RX ORDER — METFORMIN HCL 500 MG
2000 TABLET, EXTENDED RELEASE 24 HR ORAL EVERY MORNING
COMMUNITY
Start: 2023-01-01

## 2023-01-01 RX ORDER — HYDROMORPHONE HYDROCHLORIDE 2 MG/1
2 TABLET ORAL EVERY 4 HOURS PRN
Qty: 19 TABLET | Refills: 0 | Status: SHIPPED | OUTPATIENT
Start: 2023-01-01 | End: 2023-01-01

## 2023-01-01 RX ORDER — MIRTAZAPINE 7.5 MG/1
7.5 TABLET, FILM COATED ORAL AT BEDTIME
Status: DISCONTINUED | OUTPATIENT
Start: 2023-01-01 | End: 2023-01-01 | Stop reason: HOSPADM

## 2023-01-01 RX ORDER — ATORVASTATIN CALCIUM 40 MG/1
40 TABLET, FILM COATED ORAL EVERY EVENING
Status: DISCONTINUED | OUTPATIENT
Start: 2023-01-01 | End: 2023-01-01 | Stop reason: HOSPADM

## 2023-01-01 RX ORDER — ONDANSETRON 8 MG/1
8 TABLET, FILM COATED ORAL EVERY 8 HOURS PRN
COMMUNITY
End: 2023-01-01 | Stop reason: DRUGHIGH

## 2023-01-01 RX ORDER — TAMSULOSIN HYDROCHLORIDE 0.4 MG/1
0.4 CAPSULE ORAL DAILY
COMMUNITY

## 2023-01-01 RX ORDER — NICOTINE POLACRILEX 4 MG
15-30 LOZENGE BUCCAL
Status: DISCONTINUED | OUTPATIENT
Start: 2023-01-01 | End: 2023-01-01 | Stop reason: HOSPADM

## 2023-01-01 RX ORDER — AMLODIPINE BESYLATE 5 MG/1
10 TABLET ORAL DAILY
COMMUNITY

## 2023-01-01 RX ORDER — DEXTROSE MONOHYDRATE 100 MG/ML
INJECTION, SOLUTION INTRAVENOUS ONCE
Status: COMPLETED | OUTPATIENT
Start: 2023-01-01 | End: 2023-01-01

## 2023-01-01 RX ORDER — INSULIN ASPART 100 [IU]/ML
INJECTION, SOLUTION INTRAVENOUS; SUBCUTANEOUS
COMMUNITY
End: 2023-01-01

## 2023-01-01 RX ORDER — HYDROMORPHONE HYDROCHLORIDE 2 MG/1
2 TABLET ORAL EVERY 8 HOURS PRN
Qty: 90 TABLET | Refills: 0 | Status: SHIPPED | OUTPATIENT
Start: 2023-01-01

## 2023-01-01 RX ORDER — AMOXICILLIN 250 MG
2 CAPSULE ORAL 2 TIMES DAILY
Status: DISCONTINUED | OUTPATIENT
Start: 2023-01-01 | End: 2023-01-01 | Stop reason: HOSPADM

## 2023-01-01 RX ORDER — LOSARTAN POTASSIUM 25 MG/1
12.5 TABLET ORAL 2 TIMES DAILY
COMMUNITY
Start: 2023-01-01

## 2023-01-01 RX ORDER — TERAZOSIN 10 MG/1
10 CAPSULE ORAL AT BEDTIME
Status: DISCONTINUED | OUTPATIENT
Start: 2023-01-01 | End: 2023-01-01 | Stop reason: HOSPADM

## 2023-01-01 RX ORDER — AMOXICILLIN 250 MG
2 CAPSULE ORAL 2 TIMES DAILY PRN
COMMUNITY
Start: 2023-01-01

## 2023-01-01 RX ORDER — HYDROMORPHONE HYDROCHLORIDE 2 MG/1
2 TABLET ORAL EVERY 8 HOURS PRN
Qty: 90 TABLET | Refills: 0 | Status: SHIPPED | OUTPATIENT
Start: 2023-01-01 | End: 2023-01-01

## 2023-01-01 RX ORDER — ACETAMINOPHEN 500 MG
1000 TABLET ORAL ONCE
Status: COMPLETED | OUTPATIENT
Start: 2023-01-01 | End: 2023-01-01

## 2023-01-01 RX ORDER — POLYETHYLENE GLYCOL 3350 17 G/17G
17 POWDER, FOR SOLUTION ORAL DAILY PRN
Status: DISCONTINUED | OUTPATIENT
Start: 2023-01-01 | End: 2023-01-01 | Stop reason: HOSPADM

## 2023-01-01 RX ORDER — ERGOCALCIFEROL 1.25 MG/1
50000 CAPSULE, LIQUID FILLED ORAL WEEKLY
COMMUNITY
End: 2023-01-01

## 2023-01-01 RX ORDER — CARVEDILOL 25 MG/1
25 TABLET ORAL 2 TIMES DAILY
Status: DISCONTINUED | OUTPATIENT
Start: 2023-01-01 | End: 2023-01-01 | Stop reason: HOSPADM

## 2023-01-01 RX ORDER — ASPIRIN 81 MG/1
81 TABLET ORAL DAILY
Status: DISCONTINUED | OUTPATIENT
Start: 2023-01-01 | End: 2023-01-01 | Stop reason: HOSPADM

## 2023-01-01 RX ORDER — ACETAMINOPHEN 500 MG
500 TABLET ORAL EVERY 4 HOURS PRN
COMMUNITY

## 2023-01-01 RX ORDER — PANTOPRAZOLE SODIUM 40 MG/1
40 TABLET, DELAYED RELEASE ORAL DAILY
Status: DISCONTINUED | OUTPATIENT
Start: 2023-01-01 | End: 2023-01-01 | Stop reason: HOSPADM

## 2023-01-01 RX ORDER — FUROSEMIDE 20 MG
20 TABLET ORAL DAILY
Status: DISCONTINUED | OUTPATIENT
Start: 2023-01-01 | End: 2023-01-01 | Stop reason: HOSPADM

## 2023-01-01 RX ORDER — HYDROMORPHONE HYDROCHLORIDE 2 MG/1
2 TABLET ORAL EVERY 8 HOURS PRN
Refills: 0 | COMMUNITY
Start: 2023-01-01 | End: 2023-01-01

## 2023-01-01 RX ORDER — ACETAMINOPHEN 325 MG/1
975 TABLET ORAL EVERY 6 HOURS PRN
Status: DISCONTINUED | OUTPATIENT
Start: 2023-01-01 | End: 2023-01-01 | Stop reason: HOSPADM

## 2023-01-01 RX ORDER — ONDANSETRON 4 MG/1
4 TABLET, FILM COATED ORAL EVERY 8 HOURS PRN
COMMUNITY
Start: 2023-01-01 | End: 2024-01-01

## 2023-01-01 RX ORDER — ENOXAPARIN SODIUM 100 MG/ML
0.5 INJECTION SUBCUTANEOUS EVERY 24 HOURS
Status: CANCELLED | OUTPATIENT
Start: 2023-01-01

## 2023-01-01 RX ADMIN — DEXTROSE MONOHYDRATE: 100 INJECTION, SOLUTION INTRAVENOUS at 08:42

## 2023-01-01 RX ADMIN — ATORVASTATIN CALCIUM 40 MG: 40 TABLET, FILM COATED ORAL at 19:03

## 2023-01-01 RX ADMIN — INSULIN ASPART 4 UNITS: 100 INJECTION, SOLUTION INTRAVENOUS; SUBCUTANEOUS at 21:53

## 2023-01-01 RX ADMIN — ISOSORBIDE MONONITRATE 150 MG: 60 TABLET, EXTENDED RELEASE ORAL at 08:20

## 2023-01-01 RX ADMIN — TERAZOSIN HYDROCHLORIDE 10 MG: 10 CAPSULE ORAL at 21:53

## 2023-01-01 RX ADMIN — CLOPIDOGREL BISULFATE 75 MG: 75 TABLET ORAL at 08:20

## 2023-01-01 RX ADMIN — ASPIRIN 81 MG: 81 TABLET, COATED ORAL at 08:21

## 2023-01-01 RX ADMIN — INSULIN GLARGINE 8 UNITS: 100 INJECTION, SOLUTION SUBCUTANEOUS at 01:27

## 2023-01-01 RX ADMIN — METFORMIN HYDROCHLORIDE 1000 MG: 500 TABLET, FILM COATED ORAL at 21:54

## 2023-01-01 RX ADMIN — CARVEDILOL 25 MG: 25 TABLET, FILM COATED ORAL at 19:04

## 2023-01-01 RX ADMIN — FUROSEMIDE 20 MG: 20 TABLET ORAL at 08:21

## 2023-01-01 RX ADMIN — ACETAMINOPHEN 1000 MG: 500 TABLET ORAL at 09:56

## 2023-01-01 RX ADMIN — Medication 1 TABLET: at 12:09

## 2023-01-01 RX ADMIN — CITALOPRAM HYDROBROMIDE 20 MG: 10 TABLET ORAL at 08:20

## 2023-01-01 RX ADMIN — PANTOPRAZOLE SODIUM 40 MG: 40 TABLET, DELAYED RELEASE ORAL at 08:20

## 2023-01-01 RX ADMIN — MIRTAZAPINE 7.5 MG: 7.5 TABLET, FILM COATED ORAL at 21:54

## 2023-01-01 RX ADMIN — DEXTROSE MONOHYDRATE: 100 INJECTION, SOLUTION INTRAVENOUS at 09:37

## 2023-01-01 RX ADMIN — CARVEDILOL 25 MG: 25 TABLET, FILM COATED ORAL at 08:20

## 2023-01-01 ASSESSMENT — ACTIVITIES OF DAILY LIVING (ADL)
ADLS_ACUITY_SCORE: 39
ADLS_ACUITY_SCORE: 37
ADLS_ACUITY_SCORE: 39
ADLS_ACUITY_SCORE: 45
ADLS_ACUITY_SCORE: 35
ADLS_ACUITY_SCORE: 43
ADLS_ACUITY_SCORE: 35
ADLS_ACUITY_SCORE: 41
ADLS_ACUITY_SCORE: 37
ADLS_ACUITY_SCORE: 39
ADLS_ACUITY_SCORE: 35
ADLS_ACUITY_SCORE: 41
ADLS_ACUITY_SCORE: 37
ADLS_ACUITY_SCORE: 35
DEPENDENT_IADLS:: CLEANING;COOKING;LAUNDRY;SHOPPING;MEAL PREPARATION;MEDICATION MANAGEMENT;MONEY MANAGEMENT;TRANSPORTATION;INCONTINENCE
ADLS_ACUITY_SCORE: 37

## 2023-01-06 ENCOUNTER — LAB REQUISITION (OUTPATIENT)
Dept: LAB | Facility: CLINIC | Age: 88
End: 2023-01-06
Payer: MEDICARE

## 2023-01-06 DIAGNOSIS — I25.2 OLD MYOCARDIAL INFARCTION: ICD-10-CM

## 2023-01-06 DIAGNOSIS — R23.1 PALLOR: ICD-10-CM

## 2023-01-06 DIAGNOSIS — I50.32 CHRONIC DIASTOLIC (CONGESTIVE) HEART FAILURE (H): ICD-10-CM

## 2023-01-06 DIAGNOSIS — K81.0 ACUTE CHOLECYSTITIS: ICD-10-CM

## 2023-01-06 LAB
CRP SERPL-MCNC: 10.4 MG/L
IRON BINDING CAPACITY (ROCHE): 274 UG/DL (ref 240–430)
IRON SATN MFR SERPL: 24 % (ref 15–46)
IRON SERPL-MCNC: 67 UG/DL (ref 61–157)
LIPASE SERPL-CCNC: 19 U/L (ref 13–60)
NT-PROBNP SERPL-MCNC: 2520 PG/ML (ref 0–1800)

## 2023-01-06 PROCEDURE — 83690 ASSAY OF LIPASE: CPT | Mod: ORL | Performed by: FAMILY MEDICINE

## 2023-01-06 PROCEDURE — 83880 ASSAY OF NATRIURETIC PEPTIDE: CPT | Mod: ORL | Performed by: FAMILY MEDICINE

## 2023-01-06 PROCEDURE — 80048 BASIC METABOLIC PNL TOTAL CA: CPT | Mod: ORL | Performed by: FAMILY MEDICINE

## 2023-01-06 PROCEDURE — 86140 C-REACTIVE PROTEIN: CPT | Mod: ORL | Performed by: FAMILY MEDICINE

## 2023-01-06 PROCEDURE — 84484 ASSAY OF TROPONIN QUANT: CPT | Mod: ORL | Performed by: FAMILY MEDICINE

## 2023-01-06 PROCEDURE — 83550 IRON BINDING TEST: CPT | Mod: ORL | Performed by: FAMILY MEDICINE

## 2023-01-07 LAB — TROPONIN T SERPL HS-MCNC: 186 NG/L

## 2023-01-09 ENCOUNTER — LAB REQUISITION (OUTPATIENT)
Dept: LAB | Facility: CLINIC | Age: 88
End: 2023-01-09
Payer: MEDICARE

## 2023-01-09 DIAGNOSIS — N18.31 CHRONIC KIDNEY DISEASE, STAGE 3A (H): ICD-10-CM

## 2023-01-09 LAB
ANION GAP SERPL CALCULATED.3IONS-SCNC: 16 MMOL/L (ref 7–15)
BUN SERPL-MCNC: 61.2 MG/DL (ref 8–23)
CALCIUM SERPL-MCNC: 9 MG/DL (ref 8.2–9.6)
CHLORIDE SERPL-SCNC: 109 MMOL/L (ref 98–107)
CREAT SERPL-MCNC: 1.36 MG/DL (ref 0.67–1.17)
DEPRECATED HCO3 PLAS-SCNC: 18 MMOL/L (ref 22–29)
GFR SERPL CREATININE-BSD FRML MDRD: 49 ML/MIN/1.73M2
GLUCOSE SERPL-MCNC: 164 MG/DL (ref 70–99)
POTASSIUM SERPL-SCNC: 4.7 MMOL/L (ref 3.4–5.3)
SODIUM SERPL-SCNC: 143 MMOL/L (ref 136–145)

## 2023-01-11 ENCOUNTER — VIRTUAL VISIT (OUTPATIENT)
Dept: PHARMACY | Facility: PHYSICIAN GROUP | Age: 88
End: 2023-01-11
Payer: COMMERCIAL

## 2023-01-11 DIAGNOSIS — I21.4 NSTEMI (NON-ST ELEVATED MYOCARDIAL INFARCTION) (H): Primary | ICD-10-CM

## 2023-01-11 DIAGNOSIS — I50.32 CHRONIC HEART FAILURE WITH PRESERVED EJECTION FRACTION (HFPEF) (H): ICD-10-CM

## 2023-01-11 DIAGNOSIS — E11.9 TYPE 2 DIABETES MELLITUS WITHOUT COMPLICATION, WITHOUT LONG-TERM CURRENT USE OF INSULIN (H): ICD-10-CM

## 2023-01-11 PROCEDURE — 99605 MTMS BY PHARM NP 15 MIN: CPT | Performed by: PHARMACIST

## 2023-01-11 PROCEDURE — 99607 MTMS BY PHARM ADDL 15 MIN: CPT | Performed by: PHARMACIST

## 2023-01-11 NOTE — PROGRESS NOTES
Medication Therapy Management (MTM) Encounter    ASSESSMENT:                            Medication Adherence/Access: No issues identified    NSTEMI: Stable.    CHF: Concerned pioglitazone may exacerbate heart failure symptoms. He has had controlled A1c and may allow more variable blood sugar with stopping pioglitazone.     Type 2 diabetes: A1c is at goal of <8%. As noted above he may benefit from stopping pioglitazone given CHF diagnosis. May be able to increase metformin to maximum of 500 mg twice a day based on renal function if needed.     PLAN:                            1. Recommend stopping pioglitazone. I will talk with your doctor about this.   2. Finishing out course of antibiotic     Follow-up: with primary doctor as directed    SUBJECTIVE/OBJECTIVE:                          Barak Berry is a 89 year old male called for a transitions of care visit. He was discharged from [facility] on 1/2/2023 for acute cholecystitis and NSTEMI. He then was in [facility] ED on 1/7/2023 for hyperkalemia.      Reason for visit: Hospital follow-up medication review.    Allergies/ADRs: Reviewed in chart  Past Medical History: Reviewed in chart  Tobacco: He reports that he quit smoking about 62 years ago. He has a 45.00 pack-year smoking history. He has never used smokeless tobacco.  Alcohol: not currently using      Medication Adherence/Access: He is taking medicines twice a day. He uses a pillbox.   Describes current regimen as:   AM-  Cefdinir 300 mg twice a day x14 days- 10 more days  Metronidazole 500 mg twice a day- this week and 3 more pills  Chlorthalidone 25 mg daily  Carvedilol 25 mg twice a day- has been occasionally skipping dinner  Metformin 500 mg once daily AM  Hydralazine 25 mg- twice a day (total of 75 mg twice a day)  Hydralazine 50 mg- twice a day (total of 75 mg twice a day)  Furosemide 20 mg Mon, Wed, Fri  Pioglitazone 45 mg once a day- he is checking BG, varies mid 100-over 200  Isosorbide 120 and 30  mg once daily-   Glipizide ER 20 mg (2x 10 mg)  Clopidogrel 75 mg daily- no bleeding that he is seeing  Pantoprazole 40 mg daily  Asprin 81 mg daily   Iron 65 mg daily     Evening-  Gabapentin 300 mg daily   Terazosin 10 mg daily  Atorvastatin 40 mg daily   Citalopram 30 mg (3x 10 mg) daily     acetaminphen 1300 mg twice a day   Diphenoxylate-atropine- 2 tablets twice a day- he has a lot of issues with diarrhea  Calcium   iCaps      Per cardiology- CAD - No recurrent angina like he had prior to his previous coronary interventions. He is not all that active anymore. He does have stable shortness of breath with stairs. I do not think this is related to his coronary disease. Cont ASA, statin, BB, ACE-i, and Imdur.  He has been tolerating Plavix without adverse effects so will continue it indefinitely unless develops reason not to.    Creatinine   Date Value Ref Range Status   01/06/2023 1.36 (H) 0.67 - 1.17 mg/dL Final     GFR Estimate   Date Value Ref Range Status   01/06/2023 49 (L) >60 mL/min/1.73m2 Final     Comment:     Effective December 21, 2021 eGFRcr in adults is calculated using the 2021 CKD-EPI creatinine equation which includes age and gender (Cecilia et al., [place], DOI: 10.1056/ZZYZxp9679771)   06/02/2021 45 (L) >60 mL/min/1.73m2 Final         Today's Vitals: There were no vitals taken for this visit.  ----------------  Post Discharge Medication Reconciliation Status: discharge medications reconciled, continue medications without change.    I spent 21 minutes with this patient today. All changes were made via collaborative practice agreement with Karel Petit MD. A copy of the visit note was provided to the patient's provider(s).    A summary of these recommendations was declined by the patient.    Kendra Apodaca, PharmD, BCACP  Medication Therapy Management Pharmacist  [facility] [[email or web address]]  Pager: [phone]      Telemedicine Visit Details  Type of service:  Telephone visit  Start Time: 10:58 AM  End  Time: 11:19 AM     Medication Therapy Recommendations  Type 2 diabetes mellitus without complication, without long-term current use of insulin (H)    Current Medication: pioglitazone (ACTOS) 45 MG tablet   Rationale: Unsafe medication for the patient - Adverse medication event - Safety   Recommendation: Discontinue Medication   Status: Contact Provider - Awaiting Response

## 2023-01-16 RX ORDER — PIOGLITAZONEHYDROCHLORIDE 45 MG/1
45 TABLET ORAL DAILY
COMMUNITY
End: 2023-02-02

## 2023-01-19 ENCOUNTER — LAB REQUISITION (OUTPATIENT)
Dept: LAB | Facility: CLINIC | Age: 88
End: 2023-01-19
Payer: MEDICARE

## 2023-01-19 DIAGNOSIS — D64.9 ANEMIA, UNSPECIFIED: ICD-10-CM

## 2023-01-19 PROCEDURE — 85025 COMPLETE CBC W/AUTO DIFF WBC: CPT | Mod: ORL | Performed by: FAMILY MEDICINE

## 2023-01-19 PROCEDURE — 86334 IMMUNOFIX E-PHORESIS SERUM: CPT | Mod: ORL | Performed by: PATHOLOGY

## 2023-01-19 PROCEDURE — 82746 ASSAY OF FOLIC ACID SERUM: CPT | Mod: ORL | Performed by: FAMILY MEDICINE

## 2023-01-19 PROCEDURE — 80053 COMPREHEN METABOLIC PANEL: CPT | Mod: ORL | Performed by: FAMILY MEDICINE

## 2023-01-19 PROCEDURE — 82607 VITAMIN B-12: CPT | Mod: ORL | Performed by: FAMILY MEDICINE

## 2023-01-19 PROCEDURE — 85045 AUTOMATED RETICULOCYTE COUNT: CPT | Mod: ORL | Performed by: FAMILY MEDICINE

## 2023-01-20 LAB
ALBUMIN SERPL BCG-MCNC: 4 G/DL (ref 3.5–5.2)
ALP SERPL-CCNC: 46 U/L (ref 40–129)
ALT SERPL W P-5'-P-CCNC: 21 U/L (ref 10–50)
ANION GAP SERPL CALCULATED.3IONS-SCNC: 16 MMOL/L (ref 7–15)
AST SERPL W P-5'-P-CCNC: 33 U/L (ref 10–50)
BASOPHILS # BLD AUTO: 0 10E3/UL (ref 0–0.2)
BASOPHILS NFR BLD AUTO: 1 %
BILIRUB SERPL-MCNC: 0.3 MG/DL
BUN SERPL-MCNC: 80.6 MG/DL (ref 8–23)
CALCIUM SERPL-MCNC: 9.5 MG/DL (ref 8.2–9.6)
CHLORIDE SERPL-SCNC: 103 MMOL/L (ref 98–107)
CREAT SERPL-MCNC: 1.57 MG/DL (ref 0.67–1.17)
DEPRECATED HCO3 PLAS-SCNC: 21 MMOL/L (ref 22–29)
EOSINOPHIL # BLD AUTO: 0 10E3/UL (ref 0–0.7)
EOSINOPHIL NFR BLD AUTO: 0 %
ERYTHROCYTE [DISTWIDTH] IN BLOOD BY AUTOMATED COUNT: 15.3 % (ref 10–15)
GFR SERPL CREATININE-BSD FRML MDRD: 41 ML/MIN/1.73M2
GLUCOSE SERPL-MCNC: 254 MG/DL (ref 70–99)
HCT VFR BLD AUTO: 33.2 % (ref 40–53)
HGB BLD-MCNC: 10.3 G/DL (ref 13.3–17.7)
IMM GRANULOCYTES # BLD: 0 10E3/UL
IMM GRANULOCYTES NFR BLD: 0 %
LYMPHOCYTES # BLD AUTO: 1 10E3/UL (ref 0.8–5.3)
LYMPHOCYTES NFR BLD AUTO: 16 %
MCH RBC QN AUTO: 33.8 PG (ref 26.5–33)
MCHC RBC AUTO-ENTMCNC: 31 G/DL (ref 31.5–36.5)
MCV RBC AUTO: 109 FL (ref 78–100)
MONOCYTES # BLD AUTO: 0.5 10E3/UL (ref 0–1.3)
MONOCYTES NFR BLD AUTO: 8 %
NEUTROPHILS # BLD AUTO: 4.4 10E3/UL (ref 1.6–8.3)
NEUTROPHILS NFR BLD AUTO: 75 %
NRBC # BLD AUTO: 0 10E3/UL
NRBC BLD AUTO-RTO: 0 /100
PLATELET # BLD AUTO: 185 10E3/UL (ref 150–450)
POTASSIUM SERPL-SCNC: 4.9 MMOL/L (ref 3.4–5.3)
PROT SERPL-MCNC: 5.9 G/DL (ref 6.4–8.3)
RBC # BLD AUTO: 3.05 10E6/UL (ref 4.4–5.9)
RETICS # AUTO: 0.09 10E6/UL (ref 0.03–0.1)
RETICS/RBC NFR AUTO: 3 % (ref 0.5–2)
SODIUM SERPL-SCNC: 140 MMOL/L (ref 136–145)
VIT B12 SERPL-MCNC: 717 PG/ML (ref 232–1245)
WBC # BLD AUTO: 5.9 10E3/UL (ref 4–11)

## 2023-01-21 LAB — FOLATE SERPL-MCNC: 13 NG/ML (ref 4.6–34.8)

## 2023-01-23 LAB — PROT PATTERN SERPL IFE-IMP: NORMAL

## 2023-01-23 PROCEDURE — 86334 IMMUNOFIX E-PHORESIS SERUM: CPT | Mod: 26

## 2023-01-31 ENCOUNTER — DOCUMENTATION ONLY (OUTPATIENT)
Dept: GERIATRICS | Facility: CLINIC | Age: 88
End: 2023-01-31
Payer: MEDICARE

## 2023-01-31 ENCOUNTER — LAB REQUISITION (OUTPATIENT)
Dept: LAB | Facility: CLINIC | Age: 88
End: 2023-01-31

## 2023-01-31 DIAGNOSIS — R19.7 DIARRHEA, UNSPECIFIED: ICD-10-CM

## 2023-01-31 DIAGNOSIS — R19.7 DIARRHEA: Primary | ICD-10-CM

## 2023-01-31 LAB
C DIFF GDH STL QL IA: POSITIVE
C DIFF TOX A+B STL QL IA: POSITIVE
C DIFF TOX B STL QL: POSITIVE

## 2023-01-31 PROCEDURE — 87493 C DIFF AMPLIFIED PROBE: CPT | Performed by: FAMILY MEDICINE

## 2023-01-31 PROCEDURE — 87324 CLOSTRIDIUM AG IA: CPT | Performed by: FAMILY MEDICINE

## 2023-01-31 RX ORDER — DIPHENOXYLATE HCL/ATROPINE 2.5-.025MG
2 TABLET ORAL 2 TIMES DAILY PRN
Qty: 20 TABLET | Refills: 0 | Status: SHIPPED | OUTPATIENT
Start: 2023-01-31 | End: 2023-02-01

## 2023-01-31 RX ORDER — DIPHENOXYLATE HCL/ATROPINE 2.5-.025MG
2 TABLET ORAL 2 TIMES DAILY PRN
COMMUNITY
End: 2023-01-31

## 2023-01-31 NOTE — PROGRESS NOTES
[facility]iatric[facility] Triage Nurse Telephone Encounter    Provider: CELESTE Perrin  Facility: [facility]  Facility Type:  TCU    Allergies:    Allergies   Allergen Reactions     Amlodipine Unknown     Cilostazol Unknown     Vicodin [Hydrocodone-Acetaminophen] Unknown        Reason for call: Pt has had 3 episodes of diarrhea since his admission to TCU this morning. Pt has a hx of chronic diarrhea and has PRN Lomotil. However, pt has been on antibiotics recently.    Verbal Order/Direction given by Provider:     o Order C. Difficile stool specimen    o Encourage Fluids and simplified diet until diarrhea resolves, then advance as tolerated    Once c-diff sample collected, okay to resume Lomotil.      Provider giving Order:  CELESTE Perrin    Verbal Order given to: facility nurse    Mis Giron RN

## 2023-02-01 ENCOUNTER — TELEPHONE (OUTPATIENT)
Dept: GERIATRICS | Facility: CLINIC | Age: 88
End: 2023-02-01
Payer: MEDICARE

## 2023-02-01 RX ORDER — VANCOMYCIN HYDROCHLORIDE 125 MG/1
125 CAPSULE ORAL EVERY 6 HOURS
COMMUNITY
Start: 2023-02-01 | End: 2023-02-11

## 2023-02-01 NOTE — TELEPHONE ENCOUNTER
[facility]iatric[facility] Triage Nurse Telephone Encounter    Provider: CELESTE Perrin  Facility: [facility]  Facility Type:  TCU    Caller: Noemi  Call Back Number: [phone]    Allergies:    Allergies   Allergen Reactions     Amlodipine Unknown     Cilostazol Unknown     Vicodin [Hydrocodone-Acetaminophen] Unknown        Reason for call: Nurse is updating that patient tested positive for C-diff.  Patient was having loose stools since admission yesterday.  He also had 3 loose stools over the night and 1 loose stool so far today.  No fever or abdominal pain.      Verbal Order/Direction given by Provider: Vanco 125mg PO Q 6 hours x 10 days for C-diff.  Discontinue Lomotil.      Provider giving Order:  CELESTE Perrin    Verbal Order given to: Lisha Torrez RN

## 2023-02-02 ENCOUNTER — TRANSITIONAL CARE UNIT VISIT (OUTPATIENT)
Dept: GERIATRICS | Facility: CLINIC | Age: 88
End: 2023-02-02
Payer: MEDICARE

## 2023-02-02 ENCOUNTER — LAB REQUISITION (OUTPATIENT)
Dept: LAB | Facility: CLINIC | Age: 88
End: 2023-02-02
Payer: MEDICARE

## 2023-02-02 VITALS
HEIGHT: 66 IN | SYSTOLIC BLOOD PRESSURE: 128 MMHG | BODY MASS INDEX: 20.89 KG/M2 | TEMPERATURE: 98.1 F | RESPIRATION RATE: 18 BRPM | WEIGHT: 130 LBS | HEART RATE: 68 BPM | DIASTOLIC BLOOD PRESSURE: 72 MMHG | OXYGEN SATURATION: 96 %

## 2023-02-02 DIAGNOSIS — R41.89 COGNITIVE IMPAIRMENT: ICD-10-CM

## 2023-02-02 DIAGNOSIS — I49.5 SSS (SICK SINUS SYNDROME) (H): ICD-10-CM

## 2023-02-02 DIAGNOSIS — A04.72 C. DIFFICILE COLITIS: Primary | ICD-10-CM

## 2023-02-02 DIAGNOSIS — I25.810 CORONARY ARTERY DISEASE INVOLVING CORONARY BYPASS GRAFT OF NATIVE HEART WITHOUT ANGINA PECTORIS: ICD-10-CM
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
## 2023-02-02 DIAGNOSIS — E11.9 TYPE 2 DIABETES MELLITUS WITHOUT COMPLICATION, WITHOUT LONG-TERM CURRENT USE OF INSULIN (H): ICD-10-CM

## 2023-02-02 DIAGNOSIS — R54 FRAILTY: ICD-10-CM

## 2023-02-02 DIAGNOSIS — R29.6 FALLS FREQUENTLY: ICD-10-CM

## 2023-02-02 DIAGNOSIS — I21.4 NSTEMI (NON-ST ELEVATED MYOCARDIAL INFARCTION) (H): ICD-10-CM

## 2023-02-02 DIAGNOSIS — K81.0 ACUTE CHOLECYSTITIS: ICD-10-CM

## 2023-02-02 DIAGNOSIS — D64.9 ANEMIA, UNSPECIFIED: ICD-10-CM

## 2023-02-02 DIAGNOSIS — K52.9 CHRONIC DIARRHEA: ICD-10-CM

## 2023-02-02 DIAGNOSIS — N18.31 STAGE 3A CHRONIC KIDNEY DISEASE (H): ICD-10-CM

## 2023-02-02 DIAGNOSIS — R45.89 DEPRESSED MOOD: ICD-10-CM

## 2023-02-02 DIAGNOSIS — Z95.1 S/P CABG (CORONARY ARTERY BYPASS GRAFT): ICD-10-CM

## 2023-02-02 DIAGNOSIS — I73.9 PAD (PERIPHERAL ARTERY DISEASE) (H): ICD-10-CM

## 2023-02-02 DIAGNOSIS — I10 ESSENTIAL (PRIMARY) HYPERTENSION: ICD-10-CM

## 2023-02-02 DIAGNOSIS — I50.32 CHRONIC HEART FAILURE WITH PRESERVED EJECTION FRACTION (HFPEF) (H): ICD-10-CM

## 2023-02-02 DIAGNOSIS — Z78.9 IMPAIRED INSTRUMENTAL ACTIVITIES OF DAILY LIVING (IADL): ICD-10-CM

## 2023-02-02 DIAGNOSIS — G89.29 OTHER CHRONIC PAIN: ICD-10-CM

## 2023-02-02 DIAGNOSIS — Z95.0 CARDIAC PACEMAKER IN SITU: ICD-10-CM

## 2023-02-02 DIAGNOSIS — D53.9 MACROCYTIC ANEMIA: ICD-10-CM

## 2023-02-02 DIAGNOSIS — M62.84 SARCOPENIA: ICD-10-CM

## 2023-02-02 DIAGNOSIS — D69.6 THROMBOCYTOPENIA (H): ICD-10-CM

## 2023-02-02 PROBLEM — I25.10 CAD (CORONARY ARTERY DISEASE): Status: ACTIVE | Noted: 2017-05-24

## 2023-02-02 PROCEDURE — 99306 1ST NF CARE HIGH MDM 50: CPT | Performed by: FAMILY MEDICINE

## 2023-02-02 RX ORDER — CITALOPRAM HYDROBROMIDE 10 MG/1
30 TABLET ORAL DAILY
COMMUNITY
End: 2023-02-08

## 2023-02-02 RX ORDER — CLOPIDOGREL BISULFATE 75 MG/1
75 TABLET ORAL DAILY
COMMUNITY
End: 2023-02-08

## 2023-02-02 RX ORDER — GLIPIZIDE 5 MG/1
5 TABLET, FILM COATED, EXTENDED RELEASE ORAL DAILY
COMMUNITY
End: 2023-03-08

## 2023-02-02 RX ORDER — GABAPENTIN 300 MG/1
300 CAPSULE ORAL
COMMUNITY
End: 2023-02-08

## 2023-02-02 RX ORDER — PANTOPRAZOLE SODIUM 40 MG/1
40 TABLET, DELAYED RELEASE ORAL DAILY
COMMUNITY
End: 2023-01-01 | Stop reason: DRUGHIGH

## 2023-02-02 NOTE — LETTER
2023        RE: Barak Berry  [address] [place] [address]        [facility] GERIATRICS    PRIMARY CARE PROVIDER AND CLINIC:  Karel Petit MD, [address] / [place] [address]  Chief Complaint   Patient presents with     Hospital F/U      [facility] Medical Record Number:  [id number]  Place of Service where encounter took place:  [facility] (SNF) [[id number]]    Barak Berry  is a 89 year old ,  (1932), with pmhx including recent NSTEMI, prior CABG (2x), HFpEF, PAD, T2DM, falls who was admitted to the above facility from  [facility]. Hospital stay 23 through 23..   HPI:      Hospital course  He was admitted to [facility] 2023 to 2023 after a fall at home.  Per review of the ED note, he had struggle to get up after using the bathroom and fell down.  Reported a feeling of his legs giving out and had sustained a strike to the left side of his head with an abrasion.  He has had multiple falls in the past 2 weeks prior to his admission.  EKG showed a paced rhythm.  BMP showed mild hyperglycemia with baseline creatinine of 1.48.  CBC without leukocytosis, anemia with hemoglobin 10.7 (about baseline), and known thrombocytopenia.  Troponin was elevated, though downtrending from prior (see further below).  Due to the head injury, he underwent CT head without acute intracranial hemorrhage, no evidence of infarction.  Did show evidence of chronic small vessel disease and moderate generalized volume loss.  CT of the cervical spine without evidence of vertebral fracture or body height loss.  No subluxation.  No significant canal stenosis.  He was already planning to be admitted to [facility] for further therapies from the community, and was able to discharge today after his admission to the facility.    He was receiving home therapies prior to his admission to the hospital related to a previous admission.  He was  hospitalized 12/29/2022 to 1/2/2023 at [facility] again.  He had presented after developing significant nausea and vomiting.  Also developed severe left-sided chest pain.  Work-up at that time notable for normal liver enzymes, low alkaline phosphatase, negative lipase.  BMP was overall at baseline with moderate hyperglycemia and low magnesium level.  EKG was without any acute change.  However troponins were found to be elevated.  Additionally, CT of the abdomen pelvis did show concentric gallbladder wall thickening with pericholecystic stranding suggestive of acute cholecystitis.  This was followed up with abdominal right upper quadrant ultrasound which confirmed the CT findings with a distended gallbladder and gall wall thickening, pericholecystic fluid, and layering sludge.  All consistent with acute cholecystitis.  Given this, and due to his significant cardiac history, cardiology was consulted.  Notably troponins continue to increase.  He was not having chest pain.  Echocardiogram did not show new regional wall motion abnormalities.  He did have known residual coronary disease that would require complicated PCI.  He was started on heparin with concern for NSTEMI.  His symptoms improved and given significant surgical risk given his cardiac history, planned to treat primarily with IV antibiotics.  Consider a percutaneous cholecystostomy tube if his symptoms not continue to improve.  However, he did continue to have improvement and discharged with a 14-day course of cefdinir and metronidazole.  He was discharged with home care with home therapies at that time.    Of note, he was seen in the ED 1/7/2023 due to elevated troponin.  However, this was decreased from his admission and he had no other concerning findings.    Today  Prior to my visit today, he had developed worsening diarrhea symptoms after his admission to the facility.  He does have a history of chronic diarrhea, however it has acutely worsened.  ALIYAH  difficile testing was positive for toxin B, GDH antigen, and general toxin; consistent with an acute C. difficile infection.  He was started on oral vancomycin for 10 days.    He is in his room today alone.  He says he is not feeling great.  This is primarily due to chronic leg and back pain.  He has been continue to have diarrhea, less frequently but still quite urgent.  He does not have abdominal pain, no nausea or vomiting.  His appetite is poor and says primarily because he is not a fan of the food.  Unclear if he was eating better at home.  He has no urinary symptoms.  No dysuria or hematuria.  Does wake up at night to urinate 2-3 times.    He does have some difficulty remembering his admissions.  He says they were due to falls.  He does not recall the prior admission in December.  He is not currently having chest pain or shortness of breath (does have episodes of breathing quickly, but does not seem to notice them).  He has no tachycardia or palpitations.    Functional Review  He lives at home by himself in the house.  Lives near [facility].  His son and daughter visit him about 3 times a week to assist him.  They primarily assist with cooking, shopping, chores around the home, taking out the trash.  He says he does his own bills and ranges is on medications.  He has been falling and had 4 falls in the past year.  Multiple falls in the last month.  He does use a front wheel walker at home.  He has been feeling weaker since his admission in December.    He does say his mood is decreased and relates this to his pain.  Says his mood has been that way for a while.  Not sure what about the pain makes his mood worse, but does say related to being unable to do usual activities.  No specific activities he is interested in doing except he wishes to be able to move more easily.    Collateral History  Called and reviewed further with his son.  He says he feels his father has been more fatigued recently.  This is  particularly after he was admitted to the hospital for cholecystitis.  Says he has been very unstable since he returned home.  He did call for a lift from EMS about 1 or 2 weeks ago.  Despite having regular home cares he is continue to have significant difficulty, which was part of why originally he was plan to admit to [facility] from the community.  He feels his father's been gradually slowing down, but more significantly since he was hospitalized.    He and his sister do visit him 3 times per week to assist with activities as noted.  Additionally he does have assistance with cleaning from service.  His son is likely to take over his bills as it seems he is missed a few in the last couple months.    He said there are no major thinking or memory concerns.  He did have trouble recalling [facility] when he was in the ED but this seems to have resolved.  His son also has noted he did have an appointment with his oncologist with [facility] related to history of skin cancers.  He was receiving radiation to an area of his skull (though his son noted the current healing wound is related to his fall).     He did say his father's had multiple falls in the past year, again especially after his admission in December.  He also had 1 last year when he was still on Lantus and had hypoglycemia.  Insulin was discontinued because of that episode.    CODE STATUS/ADVANCE DIRECTIVES DISCUSSION:  No Order  CPR/Full code   ALLERGIES:   Allergies   Allergen Reactions     Acetaminophen Nausea and Vomiting     Other reaction(s): Nausea/vomiting     Amlodipine Unknown     Cilostazol Unknown     Hydrocodone      Other reaction(s): Nausea/vomiting     Vicodin [Hydrocodone-Acetaminophen] Unknown      PAST MEDICAL HISTORY: No past medical history on file.   PAST SURGICAL HISTORY:   has a past surgical history that includes CABG, VEIN, SINGLE; CORONARY STENT PERCUT, INITIAL VESSEL; appendectomy; Arthroscopy knee  (Left); Total Knee Arthroplasty (Left); Lumbar Laminectomy; Tonsillectomy; joint replacement; Cardiac surgery; and Colonoscopy w/wo Brush **Performed** (N/A, 1/3/2019).  FAMILY HISTORY: family history is not on file.  SOCIAL HISTORY:   reports that he quit smoking about 62 years ago. He has a 45.00 pack-year smoking history. He has never used smokeless tobacco. He reports current alcohol use of about 1.0 standard drink per week. He reports that he does not use drugs.  Patient's living condition: lives alone    He was  to his wife for 62 years.  She  2 years ago.  He has been alone in his home since.  He stopped smoking about 30 years ago after smoking approximately 1 pack/day for 40 years.  Typically has a beer with pizza when watching football weekly.    Post Discharge Medication Reconciliation Status:   MED REC REQUIRED  Post Medication Reconciliation Status: discharge medications reconciled and changed, per note/orders       Current Outpatient Medications   Medication Sig     acetaminophen (TYLENOL) 650 MG CR tablet [ACETAMINOPHEN (TYLENOL) 650 MG CR TABLET] Take 1,300 mg by mouth 2 (two) times a day.     aspirin 81 MG EC tablet [ASPIRIN 81 MG EC TABLET] Take 81 mg by mouth daily.     atorvastatin (LIPITOR) 40 MG tablet [ATORVASTATIN (LIPITOR) 40 MG TABLET] Take 40 mg by mouth daily.     carvedilol (COREG) 25 MG tablet [CARVEDILOL (COREG) 25 MG TABLET] Take 25 mg by mouth 2 (two) times a day.     citalopram (CELEXA) 10 MG tablet Take 30 mg by mouth daily     clopidogrel (PLAVIX) 75 MG tablet Take 75 mg by mouth daily     furosemide (LASIX) 20 MG tablet Take 20 mg by mouth daily     gabapentin (NEURONTIN) 300 MG capsule Take 300 mg by mouth nightly as needed     glipiZIDE (GLUCOTROL XL) 5 MG 24 hr tablet Take 5 mg by mouth daily     hydrALAZINE (APRESOLINE) 25 MG tablet Take 75 mg by mouth 2 times daily     isosorbide mononitrate (IMDUR) 30 MG 24 hr tablet Take 30 mg by mouth daily Give with 120 mg tab  "    isosorbide mononitrate CR (IMDUR) 120 MG 24 HR ER tablet Take 150 mg by mouth daily Give with 30 mg tab     nitroglycerin (NITROSTAT) 0.4 MG SL tablet [NITROGLYCERIN (NITROSTAT) 0.4 MG SL TABLET] Place 0.4 mg under the tongue as needed.     pantoprazole (PROTONIX) 40 MG EC tablet Take 40 mg by mouth daily     terazosin (HYTRIN) 10 MG capsule [TERAZOSIN (HYTRIN) 10 MG CAPSULE] Take 10 mg by mouth at bedtime.     vancomycin (VANCOCIN) 125 MG capsule Take 125 mg by mouth every 6 hours X 10 days for C-diff     vitamins  A,C,E-zinc-copper (ICAPS AREDS) 7,160-113-100 unit-mg-unit TbEC [VITAMINS  A,C,E-ZINC-COPPER (ICAPS AREDS) 7,160-113-100 UNIT-MG-UNIT TBEC] Take 1 capsule by mouth daily.     No current facility-administered medications for this visit.     Vitals:  /72   Pulse 68   Temp 98.1  F (36.7  C)   Resp 18   Ht 1.676 m (5' 6\")   Wt 59 kg (130 lb)   SpO2 96%   BMI 20.98 kg/m    Exam:    GENERAL APPEARANCE: Laying in bed comfortably, NAD, rubbing first finger and thumb together of both hand (~ tic) throughout visit  HENT:  Healing wound of left scalp; significant temporal atrophy, mildly [place]  EYES:  Conjunctiva clear, anicteric, EOMI, PERRL  PULM  Normal WOB on RA, lungs CTAB  CV:  Paced with intermittent irregularity, ; soft < > murmur at RUSB, holosystolic murmur at apex, no LE edema  ABDOMEN: Abdomen soft, not tender, not distended, BS normal and active throughout   M/S:   Grossly atrophic; 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 CN II-XII grossly intact, Decreased bulk throughout, 5/5 strength in distal and upper extremities throughout.   -Mildly increased tone without rigidity or spasticity - testing did produce " clonus  -Requires assist of 2 to sit up  PSYCH:  Mood decreased with flat, apathetic affect.     Lab/Diagnostic data:  Recent labs and imaging in [facility] reviewed by me today.      Folate 13 (23)  B12 717   (23)          Transthoracic Echo Report      SHELDON BHANDARI ID: [id number] Age: 89 : 1932 Ordering Provider: LINDSAY DEL CID    Exam Date: 2022 09:50 Gender: M Sonographer: CATIE    Accession #: [id number] Height: 66 in BSA: 1.77 m  BP: 189 / 73    Weight: 150 lbs BMI: 24.2 kg/m  HR: 70      Location: Inpatient (Portable) Rhythm: Normal Sinus Rhythm    Procedure Components: 2D imaging, Color Doppler, Spectral Doppler    Indications: Chest Pain    Technical Quality: Fair Contrast: None      Final Conclusion Previous Study: 2022    1. Normal left ventricular chamber size.  Calculated left ventricular ejection fraction   (modified Genao technique) is 55 %.    2. No regional wall motion abnormalities.    3. Calcified aortic valve with restricted motion of the non coronary and left coronary cusps.    Mild-moderate aortic valve stenosis.    4. Aortic valve systolic mean gradient is 17 mmHg.  Mild aortic valve regurgitation.    5. Mild mitral valve regurgitation.    6. When compared to the previous echocardiographic report of 2022, there has been no   significant change.      Estimated EF: 55%      FINDINGS    Left Ventricle Normal left ventricular chamber size. Basal ventricular septal thickening (1.4   cm). Normal left ventricular systolic    function. Calculated left ventricular ejection fraction (modified Genao technique) is 55 %.   No regional wall    motion abnormalities.    Diastolic Function Grade 2 left ventricular diastolic dysfunction consistent with moderately   increased left ventricular filling    pressure.    Right Ventricle Normal right ventricular chamber size. Decreased right ventricular systolic   function. Right ventricular systolic    pressure  cannot be estimated due to inability to detect peak tricuspid regurgitation Doppler   velocity.    Left Atrium Severe left atrial enlargement. Left atrial volume index is 57 ml/m .    Right Atrium Right atrial enlargement.    Atrial Septum No evidence of inter-atrial shunt by color flow Doppler.    Aortic Valve Calcified aortic valve with restricted motion of the non coronary and left   coronary cusps. Mild-moderate aortic    valve stenosis. Aortic valve systolic mean gradient is 17 mmHg. Aortic valve systolic peak   velocity is 2.7 m/sec.    Aortic valve area by Doppler is 1.1 cm . Aortic valve dimensionless index is 0.27. Mild aortic   valve regurgitation.    Mitral Valve Moderate mitral annular calcification. Thickened mitral valve. Mitral valve   diastolic mean gradient is 4 mmHg (at    a HR of 70 bpm). Mild mitral valve regurgitation.    Tricuspid Valve Normal tricuspid valve. No tricuspid valve stenosis. Trivial tricuspid valve   regurgitation.    Pulmonic Valve Normal pulmonary valve. No pulmonary valve regurgitation. No pulmonary valve   stenosis.    Pericardium No pericardial effusion.    Aorta Aortic sinus of Valsalva is normal in size (3.9 cm, ZScore = 0.45). Borderline ascending   aorta dilatation (3.7 cm,     EXAM: US ABDOMEN LIMITED RUQ   LOCATION: [facility] MEDICAL IMAGING   DATE/TIME: 12/30/2022 3:20 AM     INDICATION: Emesis. Evaluate gallbladder. Abnormal gallbladder appearance on CT.   COMPARISON: CT 12/30/2022   TECHNIQUE: Limited abdominal ultrasound.     FINDINGS:     GALLBLADDER: Distended gallbladder with gallbladder wall thickening at 9 mm. Minimal associated pericholecystic fluid. Layering sludge within the gallbladder lumen. Findings concerning for acute cholecystitis.     BILE DUCTS: No biliary dilatation. The common duct measures 4 mm.     LIVER: Normal parenchyma with smooth contour. The main portal vein with flow in the proper direction.     RIGHT KIDNEY: 10.2 x 4.6 x 5.8 cm. No  hydronephrosis.     PANCREAS: Not visualized due to bowel gas.     No ascites.       ASSESSMENT/PLAN:    (A04.72) C. difficile colitis  (primary encounter diagnosis)  Comment: Diagnosed after admission to facility with testing completely + 1/31/2023.  Started on oral vancomycin for 10-day course.  Plan:   -Continue oral vancomycin as planned    (K52.9) Chronic diarrhea  Comment: History of chronic diarrhea for which she had been on [place].  This is currently being held with C. difficile infection.  He had undergone colonoscopy in 2019 and pathology did not show microcytic colitis.  Plan:   -Consider resuming Rekha till after treatment of C. difficile    (K81.0) Acute cholecystitis  Comment: Reason for admission at the end of December with associated abdominal pain, nausea, vomiting.  His symptoms did resolve with conservative measures.  Given his cardiac history including active NSTEMI at the time of that admission, he was treated with 14-day empiric antibiotic course.  Consideration for cholecystostomy tube if his symptoms did not improve, but wishing to avoid surgery given his risks.    Geriatric Syndromes    (W19.XXXD) Fall, subsequent encounter  Comment: Primary reason for admission most recently.  Likely multifactorial related to hospital deconditioning, sarcopenia, possible malnutrition, consider hypoglycemia and/or hypotension.  No acute injuries at the time.  Scalp wound is healing appropriately    (R29.6) Falls frequently  Comment: Multiple falls in the past year, particularly after his admission for cholecystitis and NSTEMI.  Multifactorial likely due to the above considerations.  Plan:   -Obtain orthostatic vital signs  -Closely monitor blood pressures, deprescribe antihypertensives as able  --> Consider terazosin to tamsulosin, lower dose of hydralazine, and/or lower dose of carvedilol  -We will start metformin and likely discontinue glipizide (see further below)    (R54) Frailty  Comment: Significant  weight loss in the past 2 years (limited vitals available) of 50 pounds, falls, exhaustion, significant sarcopenia including temporal atrophy, all consistent with frailty.  Increased risk for poor outcomes, including with surgery.  Expect may need further cares at home or potentially moved to a different living situation.  Plan:   -PT/OT  -Medication changes as noted  -CMP    (M62.84) Sarcopenia  Comment: Significantly decreased bulk on exam, likely consistent with malnutrition.  Expect contributing to weakness, fatigue, and falls.  Plan:   -RD eval and treat  -PT/OT    (R41.89) Cognitive impairment  Comment: Suspect mild cognitive impairment versus early stage dementia given decreasing IADL function, particularly now son planning to manage his bills.  Also suspicious for cognitive impairment given his significant weight loss, as well as a behavioral component of what seems to be apathy  Plan:   -Consider further cognitive cognitive evaluation at facility or outpatient    (Z78.9) Impaired instrumental activities of daily living (IADL)  Comment: Receiving assistance from family for multiple activities at home.  As noted, son likely to manage bills in the future.  Discussed today that may need to consider different living situation given his increasing care needs.  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  Is on citalopram 30 mg daily.  No changes at this time.    (G89.29) Other chronic pain  Comment: Likely spinal and hip arthritis.  Also likely aspect of peripheral artery disease contributing.  Is on scheduled and as needed doses of Tylenol with modest effect.  Additionally, mood contributing to worsening of pain symptoms.  Plan:   -Consider transition from citalopram to duloxetine    Medical Conditions    (I50.32) Chronic heart failure with preserved ejection fraction (HFpEF) (H)  Comment: Echocardiogram with  relatively normal EF, moderate diastolic dysfunction, no new wall motion abnormalities.  Appears euvolemic on exam today.  No evidence of decompensation.  No respiratory symptoms.  Plan:   -Continue furosemide 20 mg daily  -Continue hydralazine/Imdur (consider lower dose of hydralazine)  -CMP next week    (I21.4) NSTEMI (non-ST elevated myocardial infarction) (H)  Comment: Noted to have NSTEMI with his admission at the end of December, complicating treatment of cholecystitis.  Has undergone CABG in the past and would require complicated PCI, which was not indicated  Plan:   -Continue aspirin 81 mg daily, atorvastatin 40 mg daily, Plavix 75 mg daily  -Consider transition from carvedilol to metoprolol if orthostatic blood pressures positive, but to maintain antianginal effect    (I25.810) Coronary artery disease involving coronary bypass graft of native heart without angina pectoris  (Z95.1) S/P CABG (coronary artery bypass graft)  Comment: Significant history of coronary disease status post CABG twice and multiple drug-eluting stents.  Treated for NSTEMI as above recently.     (I49.5) SSS (sick sinus syndrome) (H)  (Z95.0) Cardiac pacemaker in situ  Comment: Device functioning appropriately    (I73.9) PAD (peripheral artery disease) (H)  Comment: Contributing to increased surgical risk.  Also likely contributing to lower extremity pain.  Unfortunately with his heart failure, not candidate for cilostazol.  Plan:   -PT/OT    (E11.9) Type 2 diabetes mellitus without complication, without long-term current use of insulin (H)  Comment: Last A1c during prior admission at 7.2%.  Given age and function, less than 8% reasonable (if not 8.5%).  Has previously had severe hypoglycemia when on insulin.  He had a visit with pharmacist with discontinuation of pioglitazone due to concern of a contributing to heart failure.  Plan:   -Start metformin 500 mg daily.  Did note that this may worsen his diarrhea, but should improve with  ongoing use and treatment of C. Difficile.  Increase to 1000 mg daily if tolerated  -Continue glipizide for now, likely discontinue as metformin is added given hypoglycemia risk  -Would reevaluate risk of pioglitazone given he was previously tolerating it well  -We will avoid SGLT2i or GLP-1ra given poor appetite and  underweight    (N18.31) Stage 3a chronic kidney disease (H)  Comment: Normal and appropriate for age.  No concerns.    (D53.9) Macrocytic anemia  Comment: Stable, appears to be approximately baseline.  Likely anemia of inflammation and CKD as well as nutrition associated.  Plan:   -CBC next week  -Discontinue ferrous sulfate    (D69.6) Thrombocytopenia (H)  Comment: Chronic, no concerns for bleeding.    Orders:  [x] Discontinue ferrous sulfate  [x] Resume Metformin 500mg  [x] CMP, CBC  [x] Orthostatic vitals    139 MINUTES SPENT BY ME in prolonged visit on the date of service doing chart review, history, exam, documentation & further activities per the note.     Electronically signed by:    Benjamin Rosenstein, MD, MA  [facility]    This note was completed with the assistance of dictation software. Typos and word substitution-errors are expected and unintended.                        Sincerely,        Benjamin Rosenstein, MD

## 2023-02-02 NOTE — PROGRESS NOTES
[facility] GERIATRICS    PRIMARY CARE PROVIDER AND CLINIC:  Karel Petit MD, [address] / [place] [address]  Chief Complaint   Patient presents with     Hospital F/U      [facility] Medical Record Number:  [id number]  Place of Service where encounter took place:  [facility] (SNF) [[id number]]    Barak Berry  is a 89 year old ,  (1932), with pmhx including recent NSTEMI, prior CABG (2x), HFpEF, PAD, T2DM, falls who was admitted to the above facility from  [facility]. Hospital stay 23 through 23..   HPI:      Hospital course  He was admitted to [facility] 2023 to 2023 after a fall at home.  Per review of the ED note, he had struggle to get up after using the bathroom and fell down.  Reported a feeling of his legs giving out and had sustained a strike to the left side of his head with an abrasion.  He has had multiple falls in the past 2 weeks prior to his admission.  EKG showed a paced rhythm.  BMP showed mild hyperglycemia with baseline creatinine of 1.48.  CBC without leukocytosis, anemia with hemoglobin 10.7 (about baseline), and known thrombocytopenia.  Troponin was elevated, though downtrending from prior (see further below).  Due to the head injury, he underwent CT head without acute intracranial hemorrhage, no evidence of infarction.  Did show evidence of chronic small vessel disease and moderate generalized volume loss.  CT of the cervical spine without evidence of vertebral fracture or body height loss.  No subluxation.  No significant canal stenosis.  He was already planning to be admitted to [facility] for further therapies from the community, and was able to discharge today after his admission to the facility.    He was receiving home therapies prior to his admission to the hospital related to a previous admission.  He was hospitalized 2022 to 2023 at [facility] again.  He had presented after developing  significant nausea and vomiting.  Also developed severe left-sided chest pain.  Work-up at that time notable for normal liver enzymes, low alkaline phosphatase, negative lipase.  BMP was overall at baseline with moderate hyperglycemia and low magnesium level.  EKG was without any acute change.  However troponins were found to be elevated.  Additionally, CT of the abdomen pelvis did show concentric gallbladder wall thickening with pericholecystic stranding suggestive of acute cholecystitis.  This was followed up with abdominal right upper quadrant ultrasound which confirmed the CT findings with a distended gallbladder and gall wall thickening, pericholecystic fluid, and layering sludge.  All consistent with acute cholecystitis.  Given this, and due to his significant cardiac history, cardiology was consulted.  Notably troponins continue to increase.  He was not having chest pain.  Echocardiogram did not show new regional wall motion abnormalities.  He did have known residual coronary disease that would require complicated PCI.  He was started on heparin with concern for NSTEMI.  His symptoms improved and given significant surgical risk given his cardiac history, planned to treat primarily with IV antibiotics.  Consider a percutaneous cholecystostomy tube if his symptoms not continue to improve.  However, he did continue to have improvement and discharged with a 14-day course of cefdinir and metronidazole.  He was discharged with home care with home therapies at that time.    Of note, he was seen in the ED 1/7/2023 due to elevated troponin.  However, this was decreased from his admission and he had no other concerning findings.    Today  Prior to my visit today, he had developed worsening diarrhea symptoms after his admission to the facility.  He does have a history of chronic diarrhea, however it has acutely worsened.  C. difficile testing was positive for toxin B, GDH antigen, and general toxin; consistent with an  acute C. difficile infection.  He was started on oral vancomycin for 10 days.    He is in his room today alone.  He says he is not feeling great.  This is primarily due to chronic leg and back pain.  He has been continue to have diarrhea, less frequently but still quite urgent.  He does not have abdominal pain, no nausea or vomiting.  His appetite is poor and says primarily because he is not a fan of the food.  Unclear if he was eating better at home.  He has no urinary symptoms.  No dysuria or hematuria.  Does wake up at night to urinate 2-3 times.    He does have some difficulty remembering his admissions.  He says they were due to falls.  He does not recall the prior admission in December.  He is not currently having chest pain or shortness of breath (does have episodes of breathing quickly, but does not seem to notice them).  He has no tachycardia or palpitations.    Functional Review  He lives at home by himself in the house.  Lives near [facility].  His son and daughter visit him about 3 times a week to assist him.  They primarily assist with cooking, shopping, chores around the home, taking out the trash.  He says he does his own bills and ranges is on medications.  He has been falling and had 4 falls in the past year.  Multiple falls in the last month.  He does use a front wheel walker at home.  He has been feeling weaker since his admission in December.    He does say his mood is decreased and relates this to his pain.  Says his mood has been that way for a while.  Not sure what about the pain makes his mood worse, but does say related to being unable to do usual activities.  No specific activities he is interested in doing except he wishes to be able to move more easily.    Collateral History  Called and reviewed further with his son.  He says he feels his father has been more fatigued recently.  This is particularly after he was admitted to the hospital for cholecystitis.  Says he has been very unstable  since he returned home.  He did call for a lift from EMS about 1 or 2 weeks ago.  Despite having regular home cares he is continue to have significant difficulty, which was part of why originally he was plan to admit to [facility] from the community.  He feels his father's been gradually slowing down, but more significantly since he was hospitalized.    He and his sister do visit him 3 times per week to assist with activities as noted.  Additionally he does have assistance with cleaning from service.  His son is likely to take over his bills as it seems he is missed a few in the last couple months.    He said there are no major thinking or memory concerns.  He did have trouble recalling [facility] when he was in the ED but this seems to have resolved.  His son also has noted he did have an appointment with his oncologist with [facility] related to history of skin cancers.  He was receiving radiation to an area of his skull (though his son noted the current healing wound is related to his fall).     He did say his father's had multiple falls in the past year, again especially after his admission in December.  He also had 1 last year when he was still on Lantus and had hypoglycemia.  Insulin was discontinued because of that episode.    CODE STATUS/ADVANCE DIRECTIVES DISCUSSION:  No Order  CPR/Full code   ALLERGIES:   Allergies   Allergen Reactions     Acetaminophen Nausea and Vomiting     Other reaction(s): Nausea/vomiting     Amlodipine Unknown     Cilostazol Unknown     Hydrocodone      Other reaction(s): Nausea/vomiting     Vicodin [Hydrocodone-Acetaminophen] Unknown      PAST MEDICAL HISTORY: No past medical history on file.   PAST SURGICAL HISTORY:   has a past surgical history that includes CABG, VEIN, SINGLE; CORONARY STENT PERCUT, INITIAL VESSEL; appendectomy; Arthroscopy knee (Left); Total Knee Arthroplasty (Left); Lumbar Laminectomy; Tonsillectomy; joint replacement; Cardiac  surgery; and Colonoscopy w/wo Brush **Performed** (N/A, 1/3/2019).  FAMILY HISTORY: family history is not on file.  SOCIAL HISTORY:   reports that he quit smoking about 62 years ago. He has a 45.00 pack-year smoking history. He has never used smokeless tobacco. He reports current alcohol use of about 1.0 standard drink per week. He reports that he does not use drugs.  Patient's living condition: lives alone    He was  to his wife for 62 years.  She  2 years ago.  He has been alone in his home since.  He stopped smoking about 30 years ago after smoking approximately 1 pack/day for 40 years.  Typically has a beer with pizza when watching football weekly.    Post Discharge Medication Reconciliation Status:   MED REC REQUIRED  Post Medication Reconciliation Status: discharge medications reconciled and changed, per note/orders       Current Outpatient Medications   Medication Sig     acetaminophen (TYLENOL) 650 MG CR tablet [ACETAMINOPHEN (TYLENOL) 650 MG CR TABLET] Take 1,300 mg by mouth 2 (two) times a day.     aspirin 81 MG EC tablet [ASPIRIN 81 MG EC TABLET] Take 81 mg by mouth daily.     atorvastatin (LIPITOR) 40 MG tablet [ATORVASTATIN (LIPITOR) 40 MG TABLET] Take 40 mg by mouth daily.     carvedilol (COREG) 25 MG tablet [CARVEDILOL (COREG) 25 MG TABLET] Take 25 mg by mouth 2 (two) times a day.     citalopram (CELEXA) 10 MG tablet Take 30 mg by mouth daily     clopidogrel (PLAVIX) 75 MG tablet Take 75 mg by mouth daily     furosemide (LASIX) 20 MG tablet Take 20 mg by mouth daily     gabapentin (NEURONTIN) 300 MG capsule Take 300 mg by mouth nightly as needed     glipiZIDE (GLUCOTROL XL) 5 MG 24 hr tablet Take 5 mg by mouth daily     hydrALAZINE (APRESOLINE) 25 MG tablet Take 75 mg by mouth 2 times daily     isosorbide mononitrate (IMDUR) 30 MG 24 hr tablet Take 30 mg by mouth daily Give with 120 mg tab     isosorbide mononitrate CR (IMDUR) 120 MG 24 HR ER tablet Take 150 mg by mouth daily Give with 30  "mg tab     nitroglycerin (NITROSTAT) 0.4 MG SL tablet [NITROGLYCERIN (NITROSTAT) 0.4 MG SL TABLET] Place 0.4 mg under the tongue as needed.     pantoprazole (PROTONIX) 40 MG EC tablet Take 40 mg by mouth daily     terazosin (HYTRIN) 10 MG capsule [TERAZOSIN (HYTRIN) 10 MG CAPSULE] Take 10 mg by mouth at bedtime.     vancomycin (VANCOCIN) 125 MG capsule Take 125 mg by mouth every 6 hours X 10 days for C-diff     vitamins  A,C,E-zinc-copper (ICAPS AREDS) 7,160-113-100 unit-mg-unit TbEC [VITAMINS  A,C,E-ZINC-COPPER (ICAPS AREDS) 7,160-113-100 UNIT-MG-UNIT TBEC] Take 1 capsule by mouth daily.     No current facility-administered medications for this visit.     Vitals:  /72   Pulse 68   Temp 98.1  F (36.7  C)   Resp 18   Ht 1.676 m (5' 6\")   Wt 59 kg (130 lb)   SpO2 96%   BMI 20.98 kg/m    Exam:    GENERAL APPEARANCE: Laying in bed comfortably, NAD, rubbing first finger and thumb together of both hand (~ tic) throughout visit  HENT:  Healing wound of left scalp; significant temporal atrophy, mildly [place]  EYES:  Conjunctiva clear, anicteric, EOMI, PERRL  PULM  Normal WOB on RA, lungs CTAB  CV:  Paced with intermittent irregularity, ; soft < > murmur at RUSB, holosystolic murmur at apex, no LE edema  ABDOMEN: Abdomen soft, not tender, not distended, BS normal and active throughout   M/S:   Grossly atrophic; 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 CN II-XII grossly intact, Decreased bulk throughout, 5/5 strength in distal and upper extremities throughout.   -Mildly increased tone without rigidity or spasticity - testing did produce clonus  -Requires assist of 2 to sit up  PSYCH:  Mood decreased with flat, apathetic affect.     Lab/Diagnostic " data:  Recent labs and imaging in [facility] reviewed by me today.      Folate 13 (23)  B12 717   (23)          Transthoracic Echo Report      SHELDON BHANDARI ID: [id number] Age: 89 : 1932 Ordering Provider: LINDSAY DEL CID    Exam Date: 2022 09:50 Gender: M Sonographer: HGR    Accession #: [id number] Height: 66 in BSA: 1.77 m  BP: 189 / 73    Weight: 150 lbs BMI: 24.2 kg/m  HR: 70      Location: Inpatient (Portable) Rhythm: Normal Sinus Rhythm    Procedure Components: 2D imaging, Color Doppler, Spectral Doppler    Indications: Chest Pain    Technical Quality: Fair Contrast: None      Final Conclusion Previous Study: 2022    1. Normal left ventricular chamber size.  Calculated left ventricular ejection fraction   (modified Genao technique) is 55 %.    2. No regional wall motion abnormalities.    3. Calcified aortic valve with restricted motion of the non coronary and left coronary cusps.    Mild-moderate aortic valve stenosis.    4. Aortic valve systolic mean gradient is 17 mmHg.  Mild aortic valve regurgitation.    5. Mild mitral valve regurgitation.    6. When compared to the previous echocardiographic report of 2022, there has been no   significant change.      Estimated EF: 55%      FINDINGS    Left Ventricle Normal left ventricular chamber size. Basal ventricular septal thickening (1.4   cm). Normal left ventricular systolic    function. Calculated left ventricular ejection fraction (modified Genao technique) is 55 %.   No regional wall    motion abnormalities.    Diastolic Function Grade 2 left ventricular diastolic dysfunction consistent with moderately   increased left ventricular filling    pressure.    Right Ventricle Normal right ventricular chamber size. Decreased right ventricular systolic   function. Right ventricular systolic    pressure cannot be estimated due to inability to detect peak tricuspid regurgitation Doppler   velocity.    Left Atrium  Severe left atrial enlargement. Left atrial volume index is 57 ml/m .    Right Atrium Right atrial enlargement.    Atrial Septum No evidence of inter-atrial shunt by color flow Doppler.    Aortic Valve Calcified aortic valve with restricted motion of the non coronary and left   coronary cusps. Mild-moderate aortic    valve stenosis. Aortic valve systolic mean gradient is 17 mmHg. Aortic valve systolic peak   velocity is 2.7 m/sec.    Aortic valve area by Doppler is 1.1 cm . Aortic valve dimensionless index is 0.27. Mild aortic   valve regurgitation.    Mitral Valve Moderate mitral annular calcification. Thickened mitral valve. Mitral valve   diastolic mean gradient is 4 mmHg (at    a HR of 70 bpm). Mild mitral valve regurgitation.    Tricuspid Valve Normal tricuspid valve. No tricuspid valve stenosis. Trivial tricuspid valve   regurgitation.    Pulmonic Valve Normal pulmonary valve. No pulmonary valve regurgitation. No pulmonary valve   stenosis.    Pericardium No pericardial effusion.    Aorta Aortic sinus of Valsalva is normal in size (3.9 cm, ZScore = 0.45). Borderline ascending   aorta dilatation (3.7 cm,     EXAM: US ABDOMEN LIMITED RUQ   LOCATION: [email or web address] MEDICAL IMAGING   DATE/TIME: 12/30/2022 3:20 AM     INDICATION: Emesis. Evaluate gallbladder. Abnormal gallbladder appearance on CT.   COMPARISON: CT 12/30/2022   TECHNIQUE: Limited abdominal ultrasound.     FINDINGS:     GALLBLADDER: Distended gallbladder with gallbladder wall thickening at 9 mm. Minimal associated pericholecystic fluid. Layering sludge within the gallbladder lumen. Findings concerning for acute cholecystitis.     BILE DUCTS: No biliary dilatation. The common duct measures 4 mm.     LIVER: Normal parenchyma with smooth contour. The main portal vein with flow in the proper direction.     RIGHT KIDNEY: 10.2 x 4.6 x 5.8 cm. No hydronephrosis.     PANCREAS: Not visualized due to bowel gas.     No ascites.       ASSESSMENT/PLAN:    (A04.72) C.  difficile colitis  (primary encounter diagnosis)  Comment: Diagnosed after admission to facility with testing completely + 1/31/2023.  Started on oral vancomycin for 10-day course.  Plan:   -Continue oral vancomycin as planned    (K52.9) Chronic diarrhea  Comment: History of chronic diarrhea for which she had been on [place].  This is currently being held with C. difficile infection.  He had undergone colonoscopy in 2019 and pathology did not show microcytic colitis.  Plan:   -Consider resuming [place] till after treatment of C. difficile    (K81.0) Acute cholecystitis  Comment: Reason for admission at the end of December with associated abdominal pain, nausea, vomiting.  His symptoms did resolve with conservative measures.  Given his cardiac history including active NSTEMI at the time of that admission, he was treated with 14-day empiric antibiotic course.  Consideration for cholecystostomy tube if his symptoms did not improve, but wishing to avoid surgery given his risks.    Geriatric Syndromes    (W19.XXXD) Fall, subsequent encounter  Comment: Primary reason for admission most recently.  Likely multifactorial related to hospital deconditioning, sarcopenia, possible malnutrition, consider hypoglycemia and/or hypotension.  No acute injuries at the time.  Scalp wound is healing appropriately    (R29.6) Falls frequently  Comment: Multiple falls in the past year, particularly after his admission for cholecystitis and NSTEMI.  Multifactorial likely due to the above considerations.  Plan:   -Obtain orthostatic vital signs  -Closely monitor blood pressures, deprescribe antihypertensives as able  --> Consider terazosin to tamsulosin, lower dose of hydralazine, and/or lower dose of carvedilol  -We will start metformin and likely discontinue glipizide (see further below)    (R54) Frailty  Comment: Significant weight loss in the past 2 years (limited vitals available) of 50 pounds, falls, exhaustion, significant sarcopenia  including temporal atrophy, all consistent with frailty.  Increased risk for poor outcomes, including with surgery.  Expect may need further cares at home or potentially moved to a different living situation.  Plan:   -PT/OT  -Medication changes as noted  -CMP    (M62.84) Sarcopenia  Comment: Significantly decreased bulk on exam, likely consistent with malnutrition.  Expect contributing to weakness, fatigue, and falls.  Plan:   -RD eval and treat  -PT/OT    (R41.89) Cognitive impairment  Comment: Suspect mild cognitive impairment versus early stage dementia given decreasing IADL function, particularly now son planning to manage his bills.  Also suspicious for cognitive impairment given his significant weight loss, as well as a behavioral component of what seems to be apathy  Plan:   -Consider further cognitive cognitive evaluation at facility or outpatient    (Z78.9) Impaired instrumental activities of daily living (IADL)  Comment: Receiving assistance from family for multiple activities at home.  As noted, son likely to manage bills in the future.  Discussed today that may need to consider different living situation given his increasing care needs.  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  Is on citalopram 30 mg daily.  No changes at this time.    (G89.29) Other chronic pain  Comment: Likely spinal and hip arthritis.  Also likely aspect of peripheral artery disease contributing.  Is on scheduled and as needed doses of Tylenol with modest effect.  Additionally, mood contributing to worsening of pain symptoms.  Plan:   -Consider transition from citalopram to duloxetine    Medical Conditions    (I50.32) Chronic heart failure with preserved ejection fraction (HFpEF) (H)  Comment: Echocardiogram with relatively normal EF, moderate diastolic dysfunction, no new wall motion abnormalities.  Appears euvolemic on exam  today.  No evidence of decompensation.  No respiratory symptoms.  Plan:   -Continue furosemide 20 mg daily  -Continue hydralazine/Imdur (consider lower dose of hydralazine)  -CMP next week    (I21.4) NSTEMI (non-ST elevated myocardial infarction) (H)  Comment: Noted to have NSTEMI with his admission at the end of December, complicating treatment of cholecystitis.  Has undergone CABG in the past and would require complicated PCI, which was not indicated  Plan:   -Continue aspirin 81 mg daily, atorvastatin 40 mg daily, Plavix 75 mg daily  -Consider transition from carvedilol to metoprolol if orthostatic blood pressures positive, but to maintain antianginal effect    (I25.810) Coronary artery disease involving coronary bypass graft of native heart without angina pectoris  (Z95.1) S/P CABG (coronary artery bypass graft)  Comment: Significant history of coronary disease status post CABG twice and multiple drug-eluting stents.  Treated for NSTEMI as above recently.     (I49.5) SSS (sick sinus syndrome) (H)  (Z95.0) Cardiac pacemaker in situ  Comment: Device functioning appropriately    (I73.9) PAD (peripheral artery disease) (H)  Comment: Contributing to increased surgical risk.  Also likely contributing to lower extremity pain.  Unfortunately with his heart failure, not candidate for cilostazol.  Plan:   -PT/OT    (E11.9) Type 2 diabetes mellitus without complication, without long-term current use of insulin (H)  Comment: Last A1c during prior admission at 7.2%.  Given age and function, less than 8% reasonable (if not 8.5%).  Has previously had severe hypoglycemia when on insulin.  He had a visit with pharmacist with discontinuation of pioglitazone due to concern of a contributing to heart failure.  Plan:   -Start metformin 500 mg daily.  Did note that this may worsen his diarrhea, but should improve with ongoing use and treatment of C. Difficile.  Increase to 1000 mg daily if tolerated  -Continue glipizide for now,  likely discontinue as metformin is added given hypoglycemia risk  -Would reevaluate risk of pioglitazone given he was previously tolerating it well  -We will avoid SGLT2i or GLP-1ra given poor appetite and  underweight    (N18.31) Stage 3a chronic kidney disease (H)  Comment: Normal and appropriate for age.  No concerns.    (D53.9) Macrocytic anemia  Comment: Stable, appears to be approximately baseline.  Likely anemia of inflammation and CKD as well as nutrition associated.  Plan:   -CBC next week  -Discontinue ferrous sulfate    (D69.6) Thrombocytopenia (H)  Comment: Chronic, no concerns for bleeding.    Orders:  [x] Discontinue ferrous sulfate  [x] Resume Metformin 500mg  [x] CMP, CBC  [x] Orthostatic vitals    139 MINUTES SPENT BY ME in prolonged visit on the date of service doing chart review, history, exam, documentation & further activities per the note.      Electronically signed by:    Benjamin Rosenstein, MD, MA  [facility]    This note was completed with the assistance of dictation software. Typos and word substitution-errors are expected and unintended.

## 2023-02-06 ENCOUNTER — TRANSITIONAL CARE UNIT VISIT (OUTPATIENT)
Dept: GERIATRICS | Facility: CLINIC | Age: 88
End: 2023-02-06
Payer: MEDICARE

## 2023-02-06 ENCOUNTER — TELEPHONE (OUTPATIENT)
Dept: GERIATRICS | Facility: CLINIC | Age: 88
End: 2023-02-06

## 2023-02-06 VITALS
WEIGHT: 130 LBS | RESPIRATION RATE: 18 BRPM | HEIGHT: 66 IN | OXYGEN SATURATION: 98 % | TEMPERATURE: 99 F | DIASTOLIC BLOOD PRESSURE: 59 MMHG | BODY MASS INDEX: 20.89 KG/M2 | SYSTOLIC BLOOD PRESSURE: 140 MMHG | HEART RATE: 67 BPM

## 2023-02-06 DIAGNOSIS — Z87.19 HISTORY OF CHOLECYSTITIS: ICD-10-CM

## 2023-02-06 DIAGNOSIS — Z95.0 CARDIAC PACEMAKER IN SITU: ICD-10-CM

## 2023-02-06 DIAGNOSIS — D53.9 MACROCYTIC ANEMIA: ICD-10-CM

## 2023-02-06 DIAGNOSIS — I73.9 PAD (PERIPHERAL ARTERY DISEASE) (H): ICD-10-CM

## 2023-02-06 DIAGNOSIS — I50.32 CHRONIC HEART FAILURE WITH PRESERVED EJECTION FRACTION (HFPEF) (H): ICD-10-CM

## 2023-02-06 DIAGNOSIS — D69.6 THROMBOCYTOPENIA (H): ICD-10-CM

## 2023-02-06 DIAGNOSIS — I25.810 CORONARY ARTERY DISEASE INVOLVING CORONARY BYPASS GRAFT OF NATIVE HEART WITHOUT ANGINA PECTORIS: ICD-10-CM

## 2023-02-06 DIAGNOSIS — R29.898 WEAKNESS OF BOTH LOWER EXTREMITIES: Primary | ICD-10-CM

## 2023-02-06 DIAGNOSIS — I49.5 SSS (SICK SINUS SYNDROME) (H): ICD-10-CM

## 2023-02-06 DIAGNOSIS — E11.9 TYPE 2 DIABETES MELLITUS WITHOUT COMPLICATION, WITHOUT LONG-TERM CURRENT USE OF INSULIN (H): ICD-10-CM

## 2023-02-06 DIAGNOSIS — I25.2 HISTORY OF NON-ST ELEVATION MYOCARDIAL INFARCTION (NSTEMI): ICD-10-CM

## 2023-02-06 DIAGNOSIS — N18.31 STAGE 3A CHRONIC KIDNEY DISEASE (H): ICD-10-CM

## 2023-02-06 DIAGNOSIS — A04.72 C. DIFFICILE COLITIS: ICD-10-CM

## 2023-02-06 DIAGNOSIS — Z95.1 S/P CABG (CORONARY ARTERY BYPASS GRAFT): ICD-10-CM

## 2023-02-06 LAB
BASOPHILS # BLD AUTO: 0 10E3/UL (ref 0–0.2)
BASOPHILS NFR BLD AUTO: 0 %
EOSINOPHIL # BLD AUTO: 0.1 10E3/UL (ref 0–0.7)
EOSINOPHIL NFR BLD AUTO: 2 %
ERYTHROCYTE [DISTWIDTH] IN BLOOD BY AUTOMATED COUNT: 13.2 % (ref 10–15)
HCT VFR BLD AUTO: 31.7 % (ref 40–53)
HGB BLD-MCNC: 10.2 G/DL (ref 13.3–17.7)
IMM GRANULOCYTES # BLD: 0.1 10E3/UL
IMM GRANULOCYTES NFR BLD: 1 %
LYMPHOCYTES # BLD AUTO: 1 10E3/UL (ref 0.8–5.3)
LYMPHOCYTES NFR BLD AUTO: 14 %
MCH RBC QN AUTO: 33.4 PG (ref 26.5–33)
MCHC RBC AUTO-ENTMCNC: 32.2 G/DL (ref 31.5–36.5)
MCV RBC AUTO: 104 FL (ref 78–100)
MONOCYTES # BLD AUTO: 0.6 10E3/UL (ref 0–1.3)
MONOCYTES NFR BLD AUTO: 8 %
NEUTROPHILS # BLD AUTO: 5 10E3/UL (ref 1.6–8.3)
NEUTROPHILS NFR BLD AUTO: 75 %
NRBC # BLD AUTO: 0 10E3/UL
NRBC BLD AUTO-RTO: 0 /100
PLATELET # BLD AUTO: 118 10E3/UL (ref 150–450)
RBC # BLD AUTO: 3.05 10E6/UL (ref 4.4–5.9)
WBC # BLD AUTO: 6.7 10E3/UL (ref 4–11)

## 2023-02-06 PROCEDURE — 99309 SBSQ NF CARE MODERATE MDM 30: CPT | Performed by: NURSE PRACTITIONER

## 2023-02-06 PROCEDURE — P9604 ONE-WAY ALLOW PRORATED TRIP: HCPCS | Performed by: FAMILY MEDICINE

## 2023-02-06 PROCEDURE — 36415 COLL VENOUS BLD VENIPUNCTURE: CPT | Performed by: FAMILY MEDICINE

## 2023-02-06 PROCEDURE — 85025 COMPLETE CBC W/AUTO DIFF WBC: CPT | Performed by: FAMILY MEDICINE

## 2023-02-06 RX ORDER — HYDRALAZINE HYDROCHLORIDE 50 MG/1
50 TABLET, FILM COATED ORAL
COMMUNITY
Start: 2022-09-12 | End: 2023-02-27

## 2023-02-06 RX ORDER — SENNOSIDES A AND B 8.6 MG/1
8.6-17.2 TABLET, FILM COATED ORAL
COMMUNITY
Start: 2023-01-02 | End: 2023-02-27

## 2023-02-06 RX ORDER — ATORVASTATIN CALCIUM 40 MG/1
40 TABLET, FILM COATED ORAL EVERY EVENING
COMMUNITY
Start: 2022-10-31

## 2023-02-06 RX ORDER — TERAZOSIN 10 MG/1
1 CAPSULE ORAL AT BEDTIME
COMMUNITY
Start: 2022-10-03 | End: 2023-01-01

## 2023-02-06 RX ORDER — CHLORTHALIDONE 25 MG/1
25 TABLET ORAL
COMMUNITY
Start: 2023-01-02 | End: 2023-02-27 | Stop reason: SINTOL

## 2023-02-06 RX ORDER — CLOPIDOGREL BISULFATE 75 MG/1
1 TABLET ORAL EVERY MORNING
COMMUNITY
Start: 2022-08-01

## 2023-02-06 RX ORDER — GLIPIZIDE 10 MG/1
5 TABLET, FILM COATED, EXTENDED RELEASE ORAL
COMMUNITY
Start: 2023-01-23 | End: 2023-02-08

## 2023-02-06 RX ORDER — ISOSORBIDE MONONITRATE 120 MG/1
120 TABLET, EXTENDED RELEASE ORAL
COMMUNITY
Start: 2022-08-01 | End: 2023-01-01

## 2023-02-06 RX ORDER — VITC/E/ZINC/COPPER/LUTEIN/ZEAX 250 MG-200
1 TABLET,CHEWABLE ORAL DAILY
COMMUNITY
End: 2023-01-01

## 2023-02-06 RX ORDER — CARVEDILOL 25 MG/1
25 TABLET ORAL 2 TIMES DAILY
COMMUNITY
Start: 2022-08-01

## 2023-02-06 RX ORDER — SENNOSIDES 8.6 MG
1300 CAPSULE ORAL
COMMUNITY
End: 2023-01-01

## 2023-02-06 RX ORDER — GABAPENTIN 300 MG/1
300 CAPSULE ORAL DAILY PRN
COMMUNITY
End: 2023-01-01

## 2023-02-06 RX ORDER — PIOGLITAZONEHYDROCHLORIDE 30 MG/1
30 TABLET ORAL DAILY
Status: ON HOLD | COMMUNITY
Start: 2022-10-20 | End: 2023-01-01

## 2023-02-06 RX ORDER — FUROSEMIDE 20 MG
20 TABLET ORAL
COMMUNITY
Start: 2023-01-23

## 2023-02-06 RX ORDER — HYDRALAZINE HYDROCHLORIDE 25 MG/1
25 TABLET, FILM COATED ORAL
COMMUNITY
End: 2023-02-08

## 2023-02-06 RX ORDER — CITALOPRAM HYDROBROMIDE 10 MG/1
20 TABLET ORAL DAILY
COMMUNITY
Start: 2022-10-05

## 2023-02-06 NOTE — LETTER
2023        RE: Barak Berry  [address]  [place] [address]        [facility]    Name:   Barak Berry  :   1932  MRN:    [id number]     Facility:   [facility] (SNF) [[id number]]   Room: [place] [address]  Code Status: DNR and POLST AVAILABLE -     DOS:  2023    PCP:  Karel Petit MD     CHIEF COMPLAINT / REASON FOR VISIT:  Chief Complaint   Patient presents with     Clinic Care Coordination - Follow-up     Acute cholecystitis, NSTEMI      [facility] from 2022 until 2023 (acute cholecystitis, NSTEMI)  [facility] from 2023 until 2023 (mechanical fall)      HPI: Barak is a 89 year old male with a past medical history significant for diabetes mellitus type 2, essential hypertension, CKD stage III, CAD (s/p CABG , ), chronic HFpEF, sick sinus syndrome (s/p pacemaker), PAD, dyslipidemia, and GERD, who presented to the ED with nausea on 2022.  He had been in his normal state of health until that afternoon, will when he developed severe nausea and vomiting.  He subsequently developed left-sided chest pain, and heavy pressure in nature (no radiation to arm/jaw), and he was having low back pain as well.  Seen by cardiology, noting: NSTEMI with known residual CAD which would require complicated PCI.  TTE with EF 55%, no regional wall motion abnormalities, mild to moderate AV stenosis with mild AV regurgitation but no significant change compared to echo of 2022.  Given his history, cardiology opted for medical management, beginning with 48 hours of IV heparin.  He is to continue ASA, atorvastatin, carvedilol, clopidogrel, and isosorbide mononitrate.  He was also discharged on chlorthalidone.  Seen by general surgery re: Imaging concerning for cholecystitis.  Given that he is a very high surgical risk, there were no plans for surgical or IR intervention, and he had been doing well on antibiotic therapy.  Surgery  "recommended 14 days of antibiotic therapy and discharged on cefdinir and metronidazole.      He suffered a mechanical fall on 01/30/2023 and was admitted to [facility] after a fall at home.  He struggled to get up from the bathroom floor.  He described a feeling like his legs were giving out, and he did sustain a contusion to the left side of his head.  He had had multiple falls in the 2 weeks prior to his admission.  He reported falling 4 times before deciding he needed therapy.  EKG showed paced rhythm, BMP mild hyperglycemia and baseline creatinine of 1.48.  CBC unremarkable except for baseline hemoglobin 10.7 and known thrombocytopenia.  Troponin was elevated though downtrending.  He underwent a CT head revealing no acute intracranial hemorrhage or evidence of infarction.  Otherwise, typical age-related chronic small vessel disease and moderate generalized volume loss.  CT cervical spine without evidence of vertebral fracture or body height loss.  No canal stenosis.  The following day, per his earlier plans, he was admitted to this facility.      CURRENT/RECENT TCU ISSUES    Disposition  -- Since his arrival here, he has been started (02/01) on vancomycin for C. difficile, and Lomotil was discontinued.  He is having some diarrhea now.  Dr. Rosenstein discontinued his FeSO4.  -- A CBC and CMP are scheduled for today.  Staff were also instructed to check orthostatic vitals.  -- He said, \"they didn't convince me [that I had a heart attack].\"  -- He has felt weaker since his hospitalization in December.  -- He appreciates working with therapies to try and strengthen his legs.  As mentioned, this isn't something he had planned.  -- Asked about any possible memory issues, he responds, \"not major, some.\"  -- His knees tend to bother him.  This is despite knee replacements.    Diabetes mellitus type 2  -- He tells me his blood sugars are \"way too high.\"  They are running in the 200s and occasional 300s.  -- On " "02/01, Dr. Rosenstein started him on 500 mg of metformin XR daily.  I believe this had been stopped earlier as possibly the cause of diarrhea.  As he has been able to tolerate that dose, we can increase it to 1000 mg daily.  He also receives glipizide, and we may wish to take him off, depending on metformin's efficacy.  -- He also been taken off of pioglitazone, and we should consider reevaluating and possibly restarting, as he had been tolerating it well prior to his hospitalization.  -- Last A1c prior to admission was 7.2%.  -- Has previously had hypoglycemia while on insulin.    Chronic HFpEF  -- On both chlorthalidone and furosemide.    C. difficile colitis  -- On extended course of vancomycin.  I believe he had this during his previous hospitalization.    Chronic kidney disease  -- Stage IIIa.  Care with nephrotoxic agents.  Care with diuretics.    Peripheral arterial disease  -- On clopidogrel and aspirin.    Discharge planning  -- Has been living alone in a house, with his son and daughter visiting about 3 times per week.      ROS: In addition to symptoms noted above, he also endorses dry eyes and has bilateral ectropion.  Sleep, he says, can be \"hit or miss.\"  Some days he sleeps well, others not.   10 point ROS of systems including Constitutional, Eyes, Respiratory, Cardiovascular, Gastroenterology, Genitourinary, Integumentary, Muscularskeletal, Psychiatric (denies any depressive symptoms) were all negative except for pertinent positives noted in my HPI.      History reviewed. No pertinent past medical history.           History reviewed. No pertinent family history.     Social History     Socioeconomic History     Marital status:      Spouse name: None     Number of children: None     Years of education: None     Highest education level: None   Tobacco Use     Smoking status: Former     Packs/day: 1.00     Years: 45.00     Pack years: 45.00     Types: Cigarettes     Quit date: 12/31/1960     Years " since quittin.1     Smokeless tobacco: Never   Substance and Sexual Activity     Alcohol use: Yes     Alcohol/week: 1.0 standard drink     Comment: Alcoholic Drinks/day: rare     Drug use: No       MEDICATIONS: Reviewed from the MAR, physician orders, and/or earlier progress notes.  MED REC REQUIRED  Post Medication Reconciliation Status: medication reconcilation previously completed during another office visit    Current Outpatient Medications   Medication Sig     atorvastatin (LIPITOR) 40 MG tablet Take 40 mg by mouth     carvedilol (COREG) 25 MG tablet Take 25 mg by mouth     chlorthalidone (HYGROTON) 25 MG tablet Take 25 mg by mouth     citalopram (CELEXA) 10 MG tablet Take 30 mg by mouth     clopidogrel (PLAVIX) 75 MG tablet Take 1 tablet by mouth every morning     furosemide (LASIX) 20 MG tablet Take 20 mg by mouth     hydrALAZINE (APRESOLINE) 50 MG tablet Take 50 mg by mouth     isosorbide mononitrate CR (IMDUR) 120 MG 24 HR ER tablet Take 120 mg by mouth     metFORMIN (GLUCOPHAGE XR) 500 MG 24 hr tablet Take 1,000 mg by mouth daily (with dinner)     pioglitazone (ACTOS) 45 MG tablet Take 45 mg by mouth     senna (SENOKOT) 8.6 MG tablet Take 8.6-17.2 mg by mouth     terazosin (HYTRIN) 10 MG capsule Take 1 capsule by mouth At Bedtime     acetaminophen (TYLENOL) 650 MG CR tablet Take 1,300 mg by mouth     aspirin 81 MG EC tablet [ASPIRIN 81 MG EC TABLET] Take 81 mg by mouth daily.     gabapentin (NEURONTIN) 300 MG capsule Take 300 mg by mouth     glipiZIDE (GLUCOTROL XL) 5 MG 24 hr tablet Take 5 mg by mouth daily     hydrALAZINE (APRESOLINE) 25 MG tablet Take 75 mg by mouth 2 times daily     isosorbide mononitrate (IMDUR) 30 MG 24 hr tablet Take 30 mg by mouth daily Give with 120 mg tab     Multiple Vitamins-Minerals (SYSTANE ICAPS AREDS2) CAPS Take 1 capsule by mouth daily     nitroglycerin (NITROSTAT) 0.4 MG SL tablet [NITROGLYCERIN (NITROSTAT) 0.4 MG SL TABLET] Place 0.4 mg under the tongue as needed.  "    ondansetron (ZOFRAN) 4 MG tablet Take 1 tablet (4 mg) by mouth every 8 hours as needed for nausea or vomiting     pantoprazole (PROTONIX) 40 MG EC tablet Take 40 mg by mouth daily     vancomycin (VANCOCIN) 125 MG capsule Take 125 mg by mouth every 6 hours X 10 days for C-diff     vitamins  A,C,E-zinc-copper (ICAPS AREDS) 7,160-113-100 unit-mg-unit TbEC [VITAMINS  A,C,E-ZINC-COPPER (ICAPS AREDS) 7,160-113-100 UNIT-MG-UNIT TBEC] Take 1 capsule by mouth daily.     No current facility-administered medications for this visit.     ALLERGIES:   Allergies   Allergen Reactions     Acetaminophen Nausea and Vomiting     Other reaction(s): Nausea/vomiting     Amlodipine Unknown     Cilostazol Unknown     Hydrocodone      Other reaction(s): Nausea/vomiting     Vicodin [Hydrocodone-Acetaminophen] Unknown     DIET: Regular, regular texture, thin liquids.  Mighty Shake Plus nutritional supplement.    Vitals:    02/06/23 1222   BP: (!) 140/59   Pulse: 67   Resp: 18   Temp: 99  F (37.2  C)   SpO2: 98%   Weight: 59 kg (130 lb)   Height: 1.676 m (5' 6\")     Wt Readings from Last 4 Encounters:   02/06/23 59 kg (130 lb)   02/02/23 59 kg (130 lb)   01/03/19 81.6 kg (180 lb)     Body mass index is 20.98 kg/m .  Body surface area is 1.66 meters squared.    EXAMINATION:   General: Pleasant, alert, and conversant elderly male, lying in bed, in no apparent distress.  He keeps the head of his bed up for comfort, denying any orthopnea.  Head: Normocephalic and atraumatic.   Eyes: PERRLA, sclerae clear.  Bilateral ectropion.  ENT: Moist oral mucosa.  He has his own teeth.  No nasal discharge.  Hearing intact.  Cardiovascular: Regular rate and rhythm with intermittent ectopy (every 5-6 beats) and a 3/6 systolic ejection murmur at the apex..   Respiratory: Lungs clear to auscultation bilaterally.   Abdomen: Nondistended.   Musculoskeletal/Extremities: Age-related arthritic changes.  No peripheral edema.  Integument: No rashes, clinically " significant lesions, or skin breakdown.   Cognitive/Psychiatric: Alert and oriented with euthymic affect.    DIAGNOSTICS:   Recent Results (from the past 240 hour(s))   C. difficile Toxin B PCR with reflex to C. difficile Antigen and Toxins A/B EIA    Collection Time: 01/31/23  3:13 PM    Specimen: Per Rectum; Stool   Result Value Ref Range    C Difficile Toxin B by PCR Positive (A) Negative   C. difficile Antigen and Toxins A/B by Enzyme Immunoassay    Collection Time: 01/31/23  3:13 PM    Specimen: Per Rectum; Stool   Result Value Ref Range    C. difficile GDH Antigen Positive (A) Negative    C. difficile Toxin Positive (A) Negative   CBC with platelets and differential    Collection Time: 02/06/23  9:50 AM   Result Value Ref Range    WBC Count 6.7 4.0 - 11.0 10e3/uL    RBC Count 3.05 (L) 4.40 - 5.90 10e6/uL    Hemoglobin 10.2 (L) 13.3 - 17.7 g/dL    Hematocrit 31.7 (L) 40.0 - 53.0 %     (H) 78 - 100 fL    MCH 33.4 (H) 26.5 - 33.0 pg    MCHC 32.2 31.5 - 36.5 g/dL    RDW 13.2 10.0 - 15.0 %    Platelet Count 118 (L) 150 - 450 10e3/uL    % Neutrophils 75 %    % Lymphocytes 14 %    % Monocytes 8 %    % Eosinophils 2 %    % Basophils 0 %    % Immature Granulocytes 1 %    NRBCs per 100 WBC 0 <1 /100    Absolute Neutrophils 5.0 1.6 - 8.3 10e3/uL    Absolute Lymphocytes 1.0 0.8 - 5.3 10e3/uL    Absolute Monocytes 0.6 0.0 - 1.3 10e3/uL    Absolute Eosinophils 0.1 0.0 - 0.7 10e3/uL    Absolute Basophils 0.0 0.0 - 0.2 10e3/uL    Absolute Immature Granulocytes 0.1 <=0.4 10e3/uL    Absolute NRBCs 0.0 10e3/uL   Basic metabolic panel    Collection Time: 02/08/23  8:47 AM   Result Value Ref Range    Sodium 135 (L) 136 - 145 mmol/L    Potassium 4.5 3.4 - 5.3 mmol/L    Chloride 96 (L) 98 - 107 mmol/L    Carbon Dioxide (CO2) 24 22 - 29 mmol/L    Anion Gap 15 7 - 15 mmol/L    Urea Nitrogen 66.6 (H) 8.0 - 23.0 mg/dL    Creatinine 1.45 (H) 0.67 - 1.17 mg/dL    Calcium 8.7 8.2 - 9.6 mg/dL    Glucose 270 (H) 70 - 99 mg/dL    GFR  Estimate 45 (L) >60 mL/min/1.73m2       ASSESSMENT/Plan:      ICD-10-CM    1. History of non-ST elevation myocardial infarction (NSTEMI)  I25.2       2. History of cholecystitis  Z87.19       3. Type 2 diabetes mellitus without complication, without long-term current use of insulin (H)  E11.9       4. C. difficile colitis  A04.72       5. Stage 3a chronic kidney disease (H)  N18.31       6. S/P CABG (coronary artery bypass graft)  Z95.1       7. Coronary artery disease involving coronary bypass graft of native heart without angina pectoris  I25.810       8. SSS (sick sinus syndrome) (H)  I49.5       9. Cardiac pacemaker in situ  Z95.0       10. Chronic heart failure with preserved ejection fraction (HFpEF) (H)  I50.32       11. PAD (peripheral artery disease) (H)  I73.9       12. Thrombocytopenia (H)  D69.6       13. Macrocytic anemia  D53.9           CHANGES:    1. Increase metformin XR from 500 mg daily to 1000 mg daily.    CARE PLAN:    The care plan, medications, vital signs, orders, and nursing notes have been reviewed, and all orders signed. Changes to care plan, if any, as noted. Otherwise, continue current plan of care.  Total time spent in this encounter was 40 minutes, with time spent reviewing previous and current hospital records as well as discussing recent events and the plan of action with the patient.    The above has been created using voice recognition software. Please be aware that this may unintentionally  produce inaccuracies and/or nonsensical sentences.      Electronically signed by: PAULINA Manjarrez CNP        Sincerely,        PAULINA Manjarrez CNP

## 2023-02-07 ENCOUNTER — LAB REQUISITION (OUTPATIENT)
Dept: LAB | Facility: CLINIC | Age: 88
End: 2023-02-07
Payer: MEDICARE

## 2023-02-07 ENCOUNTER — TELEPHONE (OUTPATIENT)
Dept: GERIATRICS | Facility: CLINIC | Age: 88
End: 2023-02-07
Payer: MEDICARE

## 2023-02-07 DIAGNOSIS — N18.30 CHRONIC KIDNEY DISEASE, STAGE 3 UNSPECIFIED (H): ICD-10-CM

## 2023-02-07 DIAGNOSIS — R11.0 NAUSEA: Primary | ICD-10-CM

## 2023-02-07 NOTE — TELEPHONE ENCOUNTER
[facility] Nurse Triage Telephone Note    Provider: CELESTE Perrin  Facility: [facility]   Facility Type:  TCU    Caller: Nelida  Call Back Number: [phone]    Allergies   Allergen Reactions     Acetaminophen Nausea and Vomiting     Other reaction(s): Nausea/vomiting     Amlodipine Unknown     Cilostazol Unknown     Hydrocodone      Other reaction(s): Nausea/vomiting     Vicodin [Hydrocodone-Acetaminophen] Unknown       Reason for call: C/o nausea and stomachache overnight into today. Unable to pinpoint the abdominal pain. No emesis. Reports feeling weak and fatigued. Last BM was formed, normal this morning. Low appetite and unable to eat d/t nausea. Hx of Cholecystitis that presented with similar symptoms - finished 14 day course of Cefdinir and Metronidazole. Currently being treated for C.diff with Vancomycin until 23. Provider recently increased Metformin to 1000mg PO daily on 23.   Vitals: BP:  149/68  P:: 73  R:: 22  SPO2: 95% R/A Temp.:  98.3  B      Verbal Order/Direction given by Provider:   - Abdominal US with attention to the gall bladder STAT for abdominal pain  - Zofran 4mg PO q8hrs PRN for nausea/vomiting  - Give Metformin with food to decrease risk of nausea and stomachache    Provider giving Order:  CELESTE Perrin    Verbal Order given to: Nelida Martin RN

## 2023-02-07 NOTE — TELEPHONE ENCOUNTER
[facility] NON-EMERGENT VOICEMAIL ENCOUNTER    Barak Berry is a 89 year old  (1/6/1932), Voicemail Message received today regarding:   Unwitnessed fall    Disposition    Vm left on the after hours line stating that patient had a fall around 5:15 am.  Patient fell in the bathroom when staff entered room.  Staff member went to get nurse for help and when they arrived back to room patient had crawled back to his bed.  Small skin tear noted to elbow.  No other injuries noted.      Requests    FYI      Electronically signed by:   Iraida Cortes RN

## 2023-02-08 ENCOUNTER — TELEPHONE (OUTPATIENT)
Dept: GERIATRICS | Facility: CLINIC | Age: 88
End: 2023-02-08
Payer: MEDICARE

## 2023-02-08 PROBLEM — A04.72 C. DIFFICILE COLITIS: Status: ACTIVE | Noted: 2023-02-08

## 2023-02-08 PROBLEM — K81.0 ACUTE CHOLECYSTITIS: Status: ACTIVE | Noted: 2023-02-08

## 2023-02-08 PROBLEM — I25.2 HISTORY OF NON-ST ELEVATION MYOCARDIAL INFARCTION (NSTEMI): Status: ACTIVE | Noted: 2023-02-08

## 2023-02-08 PROBLEM — Z87.19 HISTORY OF CHOLECYSTITIS: Status: ACTIVE | Noted: 2023-02-08

## 2023-02-08 LAB
ANION GAP SERPL CALCULATED.3IONS-SCNC: 15 MMOL/L (ref 7–15)
BUN SERPL-MCNC: 66.6 MG/DL (ref 8–23)
CALCIUM SERPL-MCNC: 8.7 MG/DL (ref 8.2–9.6)
CHLORIDE SERPL-SCNC: 96 MMOL/L (ref 98–107)
CREAT SERPL-MCNC: 1.45 MG/DL (ref 0.67–1.17)
DEPRECATED HCO3 PLAS-SCNC: 24 MMOL/L (ref 22–29)
GFR SERPL CREATININE-BSD FRML MDRD: 45 ML/MIN/1.73M2
GLUCOSE SERPL-MCNC: 270 MG/DL (ref 70–99)
POTASSIUM SERPL-SCNC: 4.5 MMOL/L (ref 3.4–5.3)
SODIUM SERPL-SCNC: 135 MMOL/L (ref 136–145)

## 2023-02-08 PROCEDURE — 36415 COLL VENOUS BLD VENIPUNCTURE: CPT | Performed by: FAMILY MEDICINE

## 2023-02-08 PROCEDURE — P9604 ONE-WAY ALLOW PRORATED TRIP: HCPCS | Performed by: FAMILY MEDICINE

## 2023-02-08 PROCEDURE — 80048 BASIC METABOLIC PNL TOTAL CA: CPT | Performed by: FAMILY MEDICINE

## 2023-02-08 RX ORDER — METFORMIN HCL 500 MG
1000 TABLET, EXTENDED RELEASE 24 HR ORAL
COMMUNITY
Start: 2023-02-06 | End: 2023-01-01 | Stop reason: DRUGHIGH

## 2023-02-08 RX ORDER — ONDANSETRON 4 MG/1
4 TABLET, FILM COATED ORAL EVERY 8 HOURS PRN
Start: 2023-02-08 | End: 2023-01-01

## 2023-02-08 NOTE — PROGRESS NOTES
[facility]    Name:   Barak Berry  :   1932  MRN:    [id number]     Facility:   [facility] () [[id number]]   Room: [facility] / [place] [address]  Code Status: DNR and POLST AVAILABLE -     DOS:  2023    PCP:  Karel Petit MD     CHIEF COMPLAINT / REASON FOR VISIT:  Chief Complaint   Patient presents with     Clinic Care Coordination - Follow-up     Acute cholecystitis, NSTEMI      [facility] from 2022 until 2023 (acute cholecystitis, NSTEMI)  [facility] from 2023 until 2023 (mechanical fall)      HPI: Barak is a 89 year old male with a past medical history significant for diabetes mellitus type 2, essential hypertension, CKD stage III, CAD (s/p CABG , ), chronic HFpEF, sick sinus syndrome (s/p pacemaker), PAD, dyslipidemia, and GERD, who presented to the ED with nausea on 2022.  He had been in his normal state of health until that afternoon, will when he developed severe nausea and vomiting.  He subsequently developed left-sided chest pain, and heavy pressure in nature (no radiation to arm/jaw), and he was having low back pain as well.  Seen by cardiology, noting: NSTEMI with known residual CAD which would require complicated PCI.  TTE with EF 55%, no regional wall motion abnormalities, mild to moderate AV stenosis with mild AV regurgitation but no significant change compared to echo of 2022.  Given his history, cardiology opted for medical management, beginning with 48 hours of IV heparin.  He is to continue ASA, atorvastatin, carvedilol, clopidogrel, and isosorbide mononitrate.  He was also discharged on chlorthalidone.  Seen by general surgery re: Imaging concerning for cholecystitis.  Given that he is a very high surgical risk, there were no plans for surgical or IR intervention, and he had been doing well on antibiotic therapy.  Surgery recommended 14 days of antibiotic therapy and discharged on cefdinir and metronidazole.   "    He suffered a mechanical fall on 01/30/2023 and was admitted to [facility] after a fall at home.  He struggled to get up from the bathroom floor.  He described a feeling like his legs were giving out, and he did sustain a contusion to the left side of his head.  He had had multiple falls in the 2 weeks prior to his admission.  He reported falling 4 times before deciding he needed therapy.  EKG showed paced rhythm, BMP mild hyperglycemia and baseline creatinine of 1.48.  CBC unremarkable except for baseline hemoglobin 10.7 and known thrombocytopenia.  Troponin was elevated though downtrending.  He underwent a CT head revealing no acute intracranial hemorrhage or evidence of infarction.  Otherwise, typical age-related chronic small vessel disease and moderate generalized volume loss.  CT cervical spine without evidence of vertebral fracture or body height loss.  No canal stenosis.  The following day, per his earlier plans, he was admitted to this facility.      CURRENT/RECENT TCU ISSUES    Disposition  -- Since his arrival here, he has been started (02/01) on vancomycin for C. difficile, and Lomotil was discontinued.  He is having some diarrhea now.  Dr. Rosenstein discontinued his FeSO4.  -- A CBC and CMP are scheduled for today.  Staff were also instructed to check orthostatic vitals.  -- He said, \"they didn't convince me [that I had a heart attack].\"  -- He has felt weaker since his hospitalization in December.  -- He appreciates working with therapies to try and strengthen his legs.  As mentioned, this isn't something he had planned.  -- Asked about any possible memory issues, he responds, \"not major, some.\"  -- His knees tend to bother him.  This is despite knee replacements.    Diabetes mellitus type 2  -- He tells me his blood sugars are \"way too high.\"  They are running in the 200s and occasional 300s.  -- On 02/01, Dr. Rosenstein started him on 500 mg of metformin XR daily.  I believe this had been " "stopped earlier as possibly the cause of diarrhea.  As he has been able to tolerate that dose, we can increase it to 1000 mg daily.  He also receives glipizide, and we may wish to take him off, depending on metformin's efficacy.  -- He also been taken off of pioglitazone, and we should consider reevaluating and possibly restarting, as he had been tolerating it well prior to his hospitalization.  -- Last A1c prior to admission was 7.2%.  -- Has previously had hypoglycemia while on insulin.    Chronic HFpEF  -- On both chlorthalidone and furosemide.    C. difficile colitis  -- On extended course of vancomycin.  I believe he had this during his previous hospitalization.    Chronic kidney disease  -- Stage IIIa.  Care with nephrotoxic agents.  Care with diuretics.    Peripheral arterial disease  -- On clopidogrel and aspirin.    Discharge planning  -- Has been living alone in a house, with his son and daughter visiting about 3 times per week.      ROS: In addition to symptoms noted above, he also endorses dry eyes and has bilateral ectropion.  Sleep, he says, can be \"hit or miss.\"  Some days he sleeps well, others not.   10 point ROS of systems including Constitutional, Eyes, Respiratory, Cardiovascular, Gastroenterology, Genitourinary, Integumentary, Muscularskeletal, Psychiatric (denies any depressive symptoms) were all negative except for pertinent positives noted in my HPI.      History reviewed. No pertinent past medical history.           History reviewed. No pertinent family history.     Social History     Socioeconomic History     Marital status:      Spouse name: None     Number of children: None     Years of education: None     Highest education level: None   Tobacco Use     Smoking status: Former     Packs/day: 1.00     Years: 45.00     Pack years: 45.00     Types: Cigarettes     Quit date: 1960     Years since quittin.1     Smokeless tobacco: Never   Substance and Sexual Activity     " Alcohol use: Yes     Alcohol/week: 1.0 standard drink     Comment: Alcoholic Drinks/day: rare     Drug use: No       MEDICATIONS: Reviewed from the MAR, physician orders, and/or earlier progress notes.  MED REC REQUIRED  Post Medication Reconciliation Status: medication reconcilation previously completed during another office visit    Current Outpatient Medications   Medication Sig     atorvastatin (LIPITOR) 40 MG tablet Take 40 mg by mouth     carvedilol (COREG) 25 MG tablet Take 25 mg by mouth     chlorthalidone (HYGROTON) 25 MG tablet Take 25 mg by mouth     citalopram (CELEXA) 10 MG tablet Take 30 mg by mouth     clopidogrel (PLAVIX) 75 MG tablet Take 1 tablet by mouth every morning     furosemide (LASIX) 20 MG tablet Take 20 mg by mouth     hydrALAZINE (APRESOLINE) 50 MG tablet Take 50 mg by mouth     isosorbide mononitrate CR (IMDUR) 120 MG 24 HR ER tablet Take 120 mg by mouth     metFORMIN (GLUCOPHAGE XR) 500 MG 24 hr tablet Take 1,000 mg by mouth daily (with dinner)     pioglitazone (ACTOS) 45 MG tablet Take 45 mg by mouth     senna (SENOKOT) 8.6 MG tablet Take 8.6-17.2 mg by mouth     terazosin (HYTRIN) 10 MG capsule Take 1 capsule by mouth At Bedtime     acetaminophen (TYLENOL) 650 MG CR tablet Take 1,300 mg by mouth     aspirin 81 MG EC tablet [ASPIRIN 81 MG EC TABLET] Take 81 mg by mouth daily.     gabapentin (NEURONTIN) 300 MG capsule Take 300 mg by mouth     glipiZIDE (GLUCOTROL XL) 5 MG 24 hr tablet Take 5 mg by mouth daily     hydrALAZINE (APRESOLINE) 25 MG tablet Take 75 mg by mouth 2 times daily     isosorbide mononitrate (IMDUR) 30 MG 24 hr tablet Take 30 mg by mouth daily Give with 120 mg tab     Multiple Vitamins-Minerals (SYSTANE ICAPS AREDS2) CAPS Take 1 capsule by mouth daily     nitroglycerin (NITROSTAT) 0.4 MG SL tablet [NITROGLYCERIN (NITROSTAT) 0.4 MG SL TABLET] Place 0.4 mg under the tongue as needed.     ondansetron (ZOFRAN) 4 MG tablet Take 1 tablet (4 mg) by mouth every 8 hours as  "needed for nausea or vomiting     pantoprazole (PROTONIX) 40 MG EC tablet Take 40 mg by mouth daily     vancomycin (VANCOCIN) 125 MG capsule Take 125 mg by mouth every 6 hours X 10 days for C-diff     vitamins  A,C,E-zinc-copper (ICAPS AREDS) 7,160-113-100 unit-mg-unit TbEC [VITAMINS  A,C,E-ZINC-COPPER (ICAPS AREDS) 7,160-113-100 UNIT-MG-UNIT TBEC] Take 1 capsule by mouth daily.     No current facility-administered medications for this visit.     ALLERGIES:   Allergies   Allergen Reactions     Acetaminophen Nausea and Vomiting     Other reaction(s): Nausea/vomiting     Amlodipine Unknown     Cilostazol Unknown     Hydrocodone      Other reaction(s): Nausea/vomiting     Vicodin [Hydrocodone-Acetaminophen] Unknown     DIET: Regular, regular texture, thin liquids.  Mighty Shake Plus nutritional supplement.    Vitals:    02/06/23 1222   BP: (!) 140/59   Pulse: 67   Resp: 18   Temp: 99  F (37.2  C)   SpO2: 98%   Weight: 59 kg (130 lb)   Height: 1.676 m (5' 6\")     Wt Readings from Last 4 Encounters:   02/06/23 59 kg (130 lb)   02/02/23 59 kg (130 lb)   01/03/19 81.6 kg (180 lb)     Body mass index is 20.98 kg/m .  Body surface area is 1.66 meters squared.    EXAMINATION:   General: Pleasant, alert, and conversant elderly male, lying in bed, in no apparent distress.  He keeps the head of his bed up for comfort, denying any orthopnea.  Head: Normocephalic and atraumatic.   Eyes: PERRLA, sclerae clear.  Bilateral ectropion.  ENT: Moist oral mucosa.  He has his own teeth.  No nasal discharge.  Hearing intact.  Cardiovascular: Regular rate and rhythm with intermittent ectopy (every 5-6 beats) and a 3/6 systolic ejection murmur at the apex..   Respiratory: Lungs clear to auscultation bilaterally.   Abdomen: Nondistended.   Musculoskeletal/Extremities: Age-related arthritic changes.  No peripheral edema.  Integument: No rashes, clinically significant lesions, or skin breakdown.   Cognitive/Psychiatric: Alert and oriented with " euthymic affect.    DIAGNOSTICS:   Recent Results (from the past 240 hour(s))   C. difficile Toxin B PCR with reflex to C. difficile Antigen and Toxins A/B EIA    Collection Time: 01/31/23  3:13 PM    Specimen: Per Rectum; Stool   Result Value Ref Range    C Difficile Toxin B by PCR Positive (A) Negative   C. difficile Antigen and Toxins A/B by Enzyme Immunoassay    Collection Time: 01/31/23  3:13 PM    Specimen: Per Rectum; Stool   Result Value Ref Range    C. difficile GDH Antigen Positive (A) Negative    C. difficile Toxin Positive (A) Negative   CBC with platelets and differential    Collection Time: 02/06/23  9:50 AM   Result Value Ref Range    WBC Count 6.7 4.0 - 11.0 10e3/uL    RBC Count 3.05 (L) 4.40 - 5.90 10e6/uL    Hemoglobin 10.2 (L) 13.3 - 17.7 g/dL    Hematocrit 31.7 (L) 40.0 - 53.0 %     (H) 78 - 100 fL    MCH 33.4 (H) 26.5 - 33.0 pg    MCHC 32.2 31.5 - 36.5 g/dL    RDW 13.2 10.0 - 15.0 %    Platelet Count 118 (L) 150 - 450 10e3/uL    % Neutrophils 75 %    % Lymphocytes 14 %    % Monocytes 8 %    % Eosinophils 2 %    % Basophils 0 %    % Immature Granulocytes 1 %    NRBCs per 100 WBC 0 <1 /100    Absolute Neutrophils 5.0 1.6 - 8.3 10e3/uL    Absolute Lymphocytes 1.0 0.8 - 5.3 10e3/uL    Absolute Monocytes 0.6 0.0 - 1.3 10e3/uL    Absolute Eosinophils 0.1 0.0 - 0.7 10e3/uL    Absolute Basophils 0.0 0.0 - 0.2 10e3/uL    Absolute Immature Granulocytes 0.1 <=0.4 10e3/uL    Absolute NRBCs 0.0 10e3/uL   Basic metabolic panel    Collection Time: 02/08/23  8:47 AM   Result Value Ref Range    Sodium 135 (L) 136 - 145 mmol/L    Potassium 4.5 3.4 - 5.3 mmol/L    Chloride 96 (L) 98 - 107 mmol/L    Carbon Dioxide (CO2) 24 22 - 29 mmol/L    Anion Gap 15 7 - 15 mmol/L    Urea Nitrogen 66.6 (H) 8.0 - 23.0 mg/dL    Creatinine 1.45 (H) 0.67 - 1.17 mg/dL    Calcium 8.7 8.2 - 9.6 mg/dL    Glucose 270 (H) 70 - 99 mg/dL    GFR Estimate 45 (L) >60 mL/min/1.73m2       ASSESSMENT/Plan:      ICD-10-CM    1. History of  non-ST elevation myocardial infarction (NSTEMI)  I25.2       2. History of cholecystitis  Z87.19       3. Type 2 diabetes mellitus without complication, without long-term current use of insulin (H)  E11.9       4. C. difficile colitis  A04.72       5. Stage 3a chronic kidney disease (H)  N18.31       6. S/P CABG (coronary artery bypass graft)  Z95.1       7. Coronary artery disease involving coronary bypass graft of native heart without angina pectoris  I25.810       8. SSS (sick sinus syndrome) (H)  I49.5       9. Cardiac pacemaker in situ  Z95.0       10. Chronic heart failure with preserved ejection fraction (HFpEF) (H)  I50.32       11. PAD (peripheral artery disease) (H)  I73.9       12. Thrombocytopenia (H)  D69.6       13. Macrocytic anemia  D53.9           CHANGES:    1. Increase metformin XR from 500 mg daily to 1000 mg daily.    CARE PLAN:    The care plan, medications, vital signs, orders, and nursing notes have been reviewed, and all orders signed. Changes to care plan, if any, as noted. Otherwise, continue current plan of care.  Total time spent in this encounter was 40 minutes, with time spent reviewing previous and current hospital records as well as discussing recent events and the plan of action with the patient.    The above has been created using voice recognition software. Please be aware that this may unintentionally  produce inaccuracies and/or nonsensical sentences.      Electronically signed by: PAULINA Manjarrez CNP

## 2023-02-08 NOTE — TELEPHONE ENCOUNTER
[facility] Lab Note     Provider: CELESTE Perrin  Facility: [facility]  Facility Type:  TCU    Allergies   Allergen Reactions     Acetaminophen Nausea and Vomiting     Other reaction(s): Nausea/vomiting     Amlodipine Unknown     Cilostazol Unknown     Hydrocodone      Other reaction(s): Nausea/vomiting     Vicodin [Hydrocodone-Acetaminophen] Unknown       Labs Reviewed by provider: BMP; abdominal XR was ordered instead of US as requested - US is rescheduled for tomorrow (2/9). Pt still c/o nausea and refusing to take PRN Zofran; eating small amounts of food.    XR results normal (see below)        Verbal Order/Direction given by Provider: NNO    Provider giving Order:  CELESTE Perrin    Verbal Order given to: Facility RN    Teresa Martin RN

## 2023-02-09 ENCOUNTER — TRANSITIONAL CARE UNIT VISIT (OUTPATIENT)
Dept: GERIATRICS | Facility: CLINIC | Age: 88
End: 2023-02-09
Payer: MEDICARE

## 2023-02-09 VITALS
DIASTOLIC BLOOD PRESSURE: 56 MMHG | BODY MASS INDEX: 20.73 KG/M2 | HEIGHT: 66 IN | WEIGHT: 129 LBS | RESPIRATION RATE: 18 BRPM | OXYGEN SATURATION: 94 % | HEART RATE: 70 BPM | SYSTOLIC BLOOD PRESSURE: 146 MMHG | TEMPERATURE: 98.2 F

## 2023-02-09 DIAGNOSIS — D69.6 THROMBOCYTOPENIA (H): ICD-10-CM

## 2023-02-09 DIAGNOSIS — D53.9 MACROCYTIC ANEMIA: ICD-10-CM

## 2023-02-09 DIAGNOSIS — I73.9 PAD (PERIPHERAL ARTERY DISEASE) (H): ICD-10-CM

## 2023-02-09 DIAGNOSIS — Z87.19 HISTORY OF CHOLECYSTITIS: ICD-10-CM

## 2023-02-09 DIAGNOSIS — E11.9 TYPE 2 DIABETES MELLITUS WITHOUT COMPLICATION, WITHOUT LONG-TERM CURRENT USE OF INSULIN (H): ICD-10-CM

## 2023-02-09 DIAGNOSIS — A04.72 C. DIFFICILE COLITIS: ICD-10-CM

## 2023-02-09 DIAGNOSIS — Z95.0 CARDIAC PACEMAKER IN SITU: ICD-10-CM

## 2023-02-09 DIAGNOSIS — I50.32 CHRONIC HEART FAILURE WITH PRESERVED EJECTION FRACTION (HFPEF) (H): ICD-10-CM

## 2023-02-09 DIAGNOSIS — I49.5 SSS (SICK SINUS SYNDROME) (H): ICD-10-CM

## 2023-02-09 DIAGNOSIS — N18.31 STAGE 3A CHRONIC KIDNEY DISEASE (H): ICD-10-CM

## 2023-02-09 DIAGNOSIS — I25.2 HISTORY OF NON-ST ELEVATION MYOCARDIAL INFARCTION (NSTEMI): Primary | ICD-10-CM

## 2023-02-09 DIAGNOSIS — I25.810 CORONARY ARTERY DISEASE INVOLVING CORONARY BYPASS GRAFT OF NATIVE HEART WITHOUT ANGINA PECTORIS: ICD-10-CM

## 2023-02-09 PROCEDURE — 99309 SBSQ NF CARE MODERATE MDM 30: CPT | Performed by: NURSE PRACTITIONER

## 2023-02-09 NOTE — LETTER
2023        RE: Barak Berry  [address]  [place] [address]        [facility]    Name:   Barak Berry  :   1932  MRN:    [id number]     Facility:   [facility] () [[id number]]   Room: [place] [address]  Code Status: DNR and POLST AVAILABLE -     DOS:  2023    PCP:  Karel Petit MD     CHIEF COMPLAINT / REASON FOR VISIT:  Chief Complaint   Patient presents with     Clinic Care Coordination - Follow-up     Acute cholecystitis, NSTEMI      [facility] from 2022 until 2023 (acute cholecystitis, NSTEMI)  [facility] from 2023 until 2023 (mechanical fall)      HPI: Barak is a 89 year old male with a past medical history significant for diabetes mellitus type 2, essential hypertension, CKD stage III, CAD (s/p CABG , ), chronic HFpEF, sick sinus syndrome (s/p pacemaker), PAD, dyslipidemia, and GERD, who presented to the ED with nausea on 2022.  He had been in his normal state of health until that afternoon, will when he developed severe nausea and vomiting.  He subsequently developed left-sided chest pain, and heavy pressure in nature (no radiation to arm/jaw), and he was having low back pain as well.  Seen by cardiology, noting: NSTEMI with known residual CAD which would require complicated PCI.  TTE with EF 55%, no regional wall motion abnormalities, mild to moderate AV stenosis with mild AV regurgitation but no significant change compared to echo of 2022.  Given his history, cardiology opted for medical management, beginning with 48 hours of IV heparin.  He is to continue ASA, atorvastatin, carvedilol, clopidogrel, and isosorbide mononitrate.  He was also discharged on chlorthalidone.  Seen by general surgery re: Imaging concerning for cholecystitis.  Given that he is a very high surgical risk, there were no plans for surgical or IR intervention, and he had been doing well on antibiotic therapy.  Surgery  "recommended 14 days of antibiotic therapy and discharged on cefdinir and metronidazole.      He suffered a mechanical fall on 01/30/2023 and was admitted to [facility] after a fall at home.  He struggled to get up from the bathroom floor.  He described a feeling like his legs were giving out, and he did sustain a contusion to the left side of his head.  He had had multiple falls in the 2 weeks prior to his admission.  He reported falling 4 times before deciding he needed therapy.  EKG showed paced rhythm, BMP mild hyperglycemia and baseline creatinine of 1.48.  CBC unremarkable except for baseline hemoglobin 10.7 and known thrombocytopenia.  Troponin was elevated though downtrending.  He underwent a CT head revealing no acute intracranial hemorrhage or evidence of infarction.  Otherwise, typical age-related chronic small vessel disease and moderate generalized volume loss.  CT cervical spine without evidence of vertebral fracture or body height loss.  No canal stenosis.  The following day, per his earlier plans, he was admitted to this facility.      CURRENT/RECENT TCU ISSUES    Disposition  -- On 02/01, he was started on vancomycin for C. difficile, and Lomotil was discontinued.  He is having some loose stools now, \"though not as much.\"  -- Questions having suffered an NSTEMI.  He said, \"they didn't convince me [that I had a heart attack].\"  -- He has felt weaker since his hospitalization in December.  He appreciates working with therapies to try and strengthen his legs.  As mentioned, this isn't something he had planned.  -- Asked about any possible memory issues earlier, he responded, \"not major, some.\"  -- Knees tend to \"knot up\" when walking, but they eventually loosen up.  They bother him despite bilateral knee replacements.    Frequent falls  -- He says he fell 3 times since his admission to the TCU.  The reason?  He uses the call light when he needs to urinate, but he ultimately cannot wait.  He " "sustained a small skin tear to the elbow.    Diabetes mellitus type 2  -- He tells me his blood sugars are \"way too high.\"  They are running in the 200s and occasional 300s.  -- On 02/01, Dr. Rosenstein started him on 500 mg of metformin XR daily.  I believe this had been stopped earlier as possibly the cause of diarrhea.  As he was able to tolerate that dose, we increased it to 1000 mg daily.  He also receives glipizide, and we may wish to take him off, depending on metformin's efficacy.  -- He also been taken off of pioglitazone, and we should consider reevaluating and possibly restarting, as he had been tolerating it well prior to his hospitalization.  -- Last A1c prior to admission was 7.2%.  -- Has previously had hypoglycemia while on insulin.    Chronic HFpEF  -- On both chlorthalidone and furosemide.  Denies any orthopnea, though he does like the head of his bed elevated for comfort.    C. difficile colitis/abdominal discomfort  -- As noted above, on extended course of vancomycin.  I believe he had this during his previous hospitalization.    -- Nursing staff reported complaints of nausea and stomachache on 02/07.  He did have a formed BM at that morning.  He does have a history of cholecystitis where symptoms were similar.  We did order an abdominal ultrasound and also ondansetron for the nausea.  Further, we ordered that his metformin be given with food.   -- Abdominal x-rays were ordered instead of the ultrasound initially, and it showed \"nonspecific bowel gas pattern without obstruction; mild increase of feces;; no renal stones; mild gastric distention.\"  Ultrasound pending.  -- Abdominal pain/discomfort has resolved.    Chronic kidney disease  -- Stage IIIa.  Care with nephrotoxic agents, diuretics.    Peripheral arterial disease  -- On clopidogrel and aspirin.    Discharge planning  -- Has been living alone in a house, with his son and daughter visiting about 3 times per week.  I believe discharge is " "planned for next week.      ROS: In addition to symptoms noted above, he has also endorsed dry eyes and has bilateral ectropion.  Sleep, he says, can be \"hit or miss.\"  Some days he sleeps well, others not.   10 point ROS of systems including Constitutional, Eyes, Respiratory, Cardiovascular, Gastroenterology, Genitourinary, Integumentary, Muscularskeletal, Psychiatric (denies any depressive symptoms) were all negative except for pertinent positives noted in my HPI.      History reviewed. No pertinent past medical history.           History reviewed. No pertinent family history.     Social History     Socioeconomic History     Marital status:      Spouse name: None     Number of children: None     Years of education: None     Highest education level: None   Tobacco Use     Smoking status: Former     Packs/day: 1.00     Years: 45.00     Pack years: 45.00     Types: Cigarettes     Quit date: 1960     Years since quittin.1     Smokeless tobacco: Never   Substance and Sexual Activity     Alcohol use: Yes     Alcohol/week: 1.0 standard drink     Comment: Alcoholic Drinks/day: rare     Drug use: No       MEDICATIONS: Reviewed from the MAR, physician orders, and/or earlier progress notes.  MED REC REQUIRED  Post Medication Reconciliation Status: medication reconcilation previously completed during another office visit    Current Outpatient Medications   Medication Sig     acetaminophen (TYLENOL) 650 MG CR tablet Take 1,300 mg by mouth     aspirin 81 MG EC tablet [ASPIRIN 81 MG EC TABLET] Take 81 mg by mouth daily.     atorvastatin (LIPITOR) 40 MG tablet Take 40 mg by mouth     carvedilol (COREG) 25 MG tablet Take 25 mg by mouth     chlorthalidone (HYGROTON) 25 MG tablet Take 25 mg by mouth     citalopram (CELEXA) 10 MG tablet Take 30 mg by mouth     clopidogrel (PLAVIX) 75 MG tablet Take 1 tablet by mouth every morning     furosemide (LASIX) 20 MG tablet Take 20 mg by mouth     gabapentin (NEURONTIN) 300 " "MG capsule Take 300 mg by mouth     glipiZIDE (GLUCOTROL XL) 5 MG 24 hr tablet Take 5 mg by mouth daily     hydrALAZINE (APRESOLINE) 25 MG tablet Take 75 mg by mouth 2 times daily     hydrALAZINE (APRESOLINE) 50 MG tablet Take 50 mg by mouth     isosorbide mononitrate (IMDUR) 30 MG 24 hr tablet Take 30 mg by mouth daily Give with 120 mg tab     isosorbide mononitrate CR (IMDUR) 120 MG 24 HR ER tablet Take 120 mg by mouth     metFORMIN (GLUCOPHAGE XR) 500 MG 24 hr tablet Take 1,000 mg by mouth daily (with dinner)     Multiple Vitamins-Minerals (SYSTANE ICAPS AREDS2) CAPS Take 1 capsule by mouth daily     nitroglycerin (NITROSTAT) 0.4 MG SL tablet [NITROGLYCERIN (NITROSTAT) 0.4 MG SL TABLET] Place 0.4 mg under the tongue as needed.     ondansetron (ZOFRAN) 4 MG tablet Take 1 tablet (4 mg) by mouth every 8 hours as needed for nausea or vomiting     pantoprazole (PROTONIX) 40 MG EC tablet Take 40 mg by mouth daily     pioglitazone (ACTOS) 45 MG tablet Take 45 mg by mouth     senna (SENOKOT) 8.6 MG tablet Take 8.6-17.2 mg by mouth     terazosin (HYTRIN) 10 MG capsule Take 1 capsule by mouth At Bedtime     vitamins  A,C,E-zinc-copper (ICAPS AREDS) 7,160-113-100 unit-mg-unit TbEC [VITAMINS  A,C,E-ZINC-COPPER (ICAPS AREDS) 7,160-113-100 UNIT-MG-UNIT TBEC] Take 1 capsule by mouth daily.     No current facility-administered medications for this visit.     ALLERGIES:   Allergies   Allergen Reactions     Acetaminophen Nausea and Vomiting     Other reaction(s): Nausea/vomiting     Amlodipine Unknown     Cilostazol Unknown     Hydrocodone      Other reaction(s): Nausea/vomiting     Vicodin [Hydrocodone-Acetaminophen] Unknown     DIET: Regular, regular texture, thin liquids.  Mighty Shake Plus nutritional supplement.    Vitals:    02/09/23 1530   BP: (!) 146/56   Pulse: 70   Resp: 18   Temp: 98.2  F (36.8  C)   SpO2: 94%   Weight: 58.5 kg (129 lb)   Height: 1.676 m (5' 6\")     Wt Readings from Last 4 Encounters:   02/09/23 58.5 kg " (129 lb)   02/06/23 59 kg (130 lb)   02/02/23 59 kg (130 lb)   01/03/19 81.6 kg (180 lb)     Body mass index is 20.82 kg/m .  Body surface area is 1.65 meters squared.    EXAMINATION:   General: Pleasant, alert, and conversant elderly male, lying in bed, in no apparent distress.  He keeps the head of his bed up for comfort, denying any orthopnea.  Head: Normocephalic and atraumatic.   Eyes: PERRLA, sclerae clear.  Bilateral ectropion.  ENT: Moist oral mucosa.  He has his own teeth.  No nasal discharge.  Hearing intact.  Cardiovascular: Regular rate and rhythm with intermittent ectopy (every 5-6 beats) and a 3/6 systolic ejection murmur at the apex..   Respiratory: Lungs clear to auscultation bilaterally.   Abdomen: Nondistended.   Musculoskeletal/Extremities: Age-related arthritic changes.  No peripheral edema.  Integument: No rashes, clinically significant lesions, or skin breakdown.   Cognitive/Psychiatric: Alert and oriented with euthymic affect.    DIAGNOSTICS:   Recent Results (from the past 240 hour(s))   CBC with platelets and differential    Collection Time: 02/06/23  9:50 AM   Result Value Ref Range    WBC Count 6.7 4.0 - 11.0 10e3/uL    RBC Count 3.05 (L) 4.40 - 5.90 10e6/uL    Hemoglobin 10.2 (L) 13.3 - 17.7 g/dL    Hematocrit 31.7 (L) 40.0 - 53.0 %     (H) 78 - 100 fL    MCH 33.4 (H) 26.5 - 33.0 pg    MCHC 32.2 31.5 - 36.5 g/dL    RDW 13.2 10.0 - 15.0 %    Platelet Count 118 (L) 150 - 450 10e3/uL    % Neutrophils 75 %    % Lymphocytes 14 %    % Monocytes 8 %    % Eosinophils 2 %    % Basophils 0 %    % Immature Granulocytes 1 %    NRBCs per 100 WBC 0 <1 /100    Absolute Neutrophils 5.0 1.6 - 8.3 10e3/uL    Absolute Lymphocytes 1.0 0.8 - 5.3 10e3/uL    Absolute Monocytes 0.6 0.0 - 1.3 10e3/uL    Absolute Eosinophils 0.1 0.0 - 0.7 10e3/uL    Absolute Basophils 0.0 0.0 - 0.2 10e3/uL    Absolute Immature Granulocytes 0.1 <=0.4 10e3/uL    Absolute NRBCs 0.0 10e3/uL   Basic metabolic panel    Collection  Time: 02/08/23  8:47 AM   Result Value Ref Range    Sodium 135 (L) 136 - 145 mmol/L    Potassium 4.5 3.4 - 5.3 mmol/L    Chloride 96 (L) 98 - 107 mmol/L    Carbon Dioxide (CO2) 24 22 - 29 mmol/L    Anion Gap 15 7 - 15 mmol/L    Urea Nitrogen 66.6 (H) 8.0 - 23.0 mg/dL    Creatinine 1.45 (H) 0.67 - 1.17 mg/dL    Calcium 8.7 8.2 - 9.6 mg/dL    Glucose 270 (H) 70 - 99 mg/dL    GFR Estimate 45 (L) >60 mL/min/1.73m2       ASSESSMENT/Plan:      ICD-10-CM    1. History of non-ST elevation myocardial infarction (NSTEMI)  I25.2       2. History of cholecystitis  Z87.19       3. Type 2 diabetes mellitus without complication, without long-term current use of insulin (H)  E11.9       4. C. difficile colitis  A04.72       5. Stage 3a chronic kidney disease (H)  N18.31       6. Coronary artery disease involving coronary bypass graft of native heart without angina pectoris  I25.810       7. SSS (sick sinus syndrome) (H)  I49.5       8. Cardiac pacemaker in situ  Z95.0       9. Chronic heart failure with preserved ejection fraction (HFpEF) (H)  I50.32       10. PAD (peripheral artery disease) (H)  I73.9       11. Thrombocytopenia (H)  D69.6       12. Macrocytic anemia  D53.9           CHANGES:    None.    CARE PLAN:    The care plan, medications, vital signs, orders, and nursing notes have been reviewed, and all orders signed. Changes to care plan, if any, as noted. Otherwise, continue current plan of care.  Total time of this encounter was 36 minutes, assessing frequent falls and C. difficile colitis.    The above has been created using voice recognition software. Please be aware that this may unintentionally  produce inaccuracies and/or nonsensical sentences.      Electronically signed by: PAULINA Manjarrez CNP        Sincerely,        PAULINA Manjarrez CNP

## 2023-02-10 ENCOUNTER — TELEPHONE (OUTPATIENT)
Dept: GERIATRICS | Facility: CLINIC | Age: 88
End: 2023-02-10
Payer: MEDICARE

## 2023-02-10 NOTE — TELEPHONE ENCOUNTER
[facility] NON-EMERGENT VOICEMAIL ENCOUNTER    Barak Berry is a 89 year old  (1/6/1932), Voicemail Message received today regarding:   Unwitnessed fall    Disposition    Vm left on the after hours line that patient had a fall around 11:45 pm.  Patient was noted by staff to be laying on the floor next to his bed with pillows under his head.  No apparent injuries.  Patient was assisted off the floor and placed back into bed.  Bed placed in the lowest position.  Facility will continue to monitor per protocol.      Requests    FYI      Electronically signed by:   Iraida Cortes RN

## 2023-02-12 NOTE — PROGRESS NOTES
[facility]    Name:   Barak Berry  :   1932  MRN:    [id number]     Facility:   [facility] ([facility]) [[id number]]   Room: [facility] / [place] [address]  Code Status: DNR and POLST AVAILABLE -     DOS:  2023    PCP:  Karel Petit MD     CHIEF COMPLAINT / REASON FOR VISIT:  Chief Complaint   Patient presents with     Clinic Care Coordination - Follow-up     Acute cholecystitis, NSTEMI      [facility] from 2022 until 2023 (acute cholecystitis, NSTEMI)  [facility] from 2023 until 2023 (mechanical fall)      HPI: Barak is a 89 year old male with a past medical history significant for diabetes mellitus type 2, essential hypertension, CKD stage III, CAD (s/p CABG , ), chronic HFpEF, sick sinus syndrome (s/p pacemaker), PAD, dyslipidemia, and GERD, who presented to the ED with nausea on 2022.  He had been in his normal state of health until that afternoon, will when he developed severe nausea and vomiting.  He subsequently developed left-sided chest pain, and heavy pressure in nature (no radiation to arm/jaw), and he was having low back pain as well.  Seen by cardiology, noting: NSTEMI with known residual CAD which would require complicated PCI.  TTE with EF 55%, no regional wall motion abnormalities, mild to moderate AV stenosis with mild AV regurgitation but no significant change compared to echo of 2022.  Given his history, cardiology opted for medical management, beginning with 48 hours of IV heparin.  He is to continue ASA, atorvastatin, carvedilol, clopidogrel, and isosorbide mononitrate.  He was also discharged on chlorthalidone.  Seen by general surgery re: Imaging concerning for cholecystitis.  Given that he is a very high surgical risk, there were no plans for surgical or IR intervention, and he had been doing well on antibiotic therapy.  Surgery recommended 14 days of antibiotic therapy and discharged on cefdinir and metronidazole.   "    He suffered a mechanical fall on 01/30/2023 and was admitted to [facility] after a fall at home.  He struggled to get up from the bathroom floor.  He described a feeling like his legs were giving out, and he did sustain a contusion to the left side of his head.  He had had multiple falls in the 2 weeks prior to his admission.  He reported falling 4 times before deciding he needed therapy.  EKG showed paced rhythm, BMP mild hyperglycemia and baseline creatinine of 1.48.  CBC unremarkable except for baseline hemoglobin 10.7 and known thrombocytopenia.  Troponin was elevated though downtrending.  He underwent a CT head revealing no acute intracranial hemorrhage or evidence of infarction.  Otherwise, typical age-related chronic small vessel disease and moderate generalized volume loss.  CT cervical spine without evidence of vertebral fracture or body height loss.  No canal stenosis.  The following day, per his earlier plans, he was admitted to this facility.      CURRENT/RECENT TCU ISSUES    Disposition  -- On 02/01, he was started on vancomycin for C. difficile, and Lomotil was discontinued.  He is having some loose stools now, \"though not as much.\"  -- Questions having suffered an NSTEMI.  He said, \"they didn't convince me [that I had a heart attack].\"  -- He has felt weaker since his hospitalization in December.  He appreciates working with therapies to try and strengthen his legs.  As mentioned, this isn't something he had planned.  -- Asked about any possible memory issues earlier, he responded, \"not major, some.\"  -- Knees tend to \"knot up\" when walking, but they eventually loosen up.  They bother him despite bilateral knee replacements.    Frequent falls  -- He says he fell 3 times since his admission to the TCU.  The reason?  He uses the call light when he needs to urinate, but he ultimately cannot wait.  He sustained a small skin tear to the elbow.    Diabetes mellitus type 2  -- He tells me his blood " "sugars are \"way too high.\"  They are running in the 200s and occasional 300s.  -- On 02/01, Dr. Rosenstein started him on 500 mg of metformin XR daily.  I believe this had been stopped earlier as possibly the cause of diarrhea.  As he was able to tolerate that dose, we increased it to 1000 mg daily.  He also receives glipizide, and we may wish to take him off, depending on metformin's efficacy.  -- He also been taken off of pioglitazone, and we should consider reevaluating and possibly restarting, as he had been tolerating it well prior to his hospitalization.  -- Last A1c prior to admission was 7.2%.  -- Has previously had hypoglycemia while on insulin.    Chronic HFpEF  -- On both chlorthalidone and furosemide.  Denies any orthopnea, though he does like the head of his bed elevated for comfort.    C. difficile colitis/abdominal discomfort  -- As noted above, on extended course of vancomycin.  I believe he had this during his previous hospitalization.    -- Nursing staff reported complaints of nausea and stomachache on 02/07.  He did have a formed BM at that morning.  He does have a history of cholecystitis where symptoms were similar.  We did order an abdominal ultrasound and also ondansetron for the nausea.  Further, we ordered that his metformin be given with food.   -- Abdominal x-rays were ordered instead of the ultrasound initially, and it showed \"nonspecific bowel gas pattern without obstruction; mild increase of feces;; no renal stones; mild gastric distention.\"  Ultrasound pending.  -- Abdominal pain/discomfort has resolved.    Chronic kidney disease  -- Stage IIIa.  Care with nephrotoxic agents, diuretics.    Peripheral arterial disease  -- On clopidogrel and aspirin.    Discharge planning  -- Has been living alone in a house, with his son and daughter visiting about 3 times per week.  I believe discharge is planned for next week.      ROS: In addition to symptoms noted above, he has also endorsed dry " "eyes and has bilateral ectropion.  Sleep, he says, can be \"hit or miss.\"  Some days he sleeps well, others not.   10 point ROS of systems including Constitutional, Eyes, Respiratory, Cardiovascular, Gastroenterology, Genitourinary, Integumentary, Muscularskeletal, Psychiatric (denies any depressive symptoms) were all negative except for pertinent positives noted in my HPI.      History reviewed. No pertinent past medical history.           History reviewed. No pertinent family history.     Social History     Socioeconomic History     Marital status:      Spouse name: None     Number of children: None     Years of education: None     Highest education level: None   Tobacco Use     Smoking status: Former     Packs/day: 1.00     Years: 45.00     Pack years: 45.00     Types: Cigarettes     Quit date: 1960     Years since quittin.1     Smokeless tobacco: Never   Substance and Sexual Activity     Alcohol use: Yes     Alcohol/week: 1.0 standard drink     Comment: Alcoholic Drinks/day: rare     Drug use: No       MEDICATIONS: Reviewed from the MAR, physician orders, and/or earlier progress notes.  MED REC REQUIRED  Post Medication Reconciliation Status: medication reconcilation previously completed during another office visit    Current Outpatient Medications   Medication Sig     acetaminophen (TYLENOL) 650 MG CR tablet Take 1,300 mg by mouth     aspirin 81 MG EC tablet [ASPIRIN 81 MG EC TABLET] Take 81 mg by mouth daily.     atorvastatin (LIPITOR) 40 MG tablet Take 40 mg by mouth     carvedilol (COREG) 25 MG tablet Take 25 mg by mouth     chlorthalidone (HYGROTON) 25 MG tablet Take 25 mg by mouth     citalopram (CELEXA) 10 MG tablet Take 30 mg by mouth     clopidogrel (PLAVIX) 75 MG tablet Take 1 tablet by mouth every morning     furosemide (LASIX) 20 MG tablet Take 20 mg by mouth     gabapentin (NEURONTIN) 300 MG capsule Take 300 mg by mouth     glipiZIDE (GLUCOTROL XL) 5 MG 24 hr tablet Take 5 mg by " "mouth daily     hydrALAZINE (APRESOLINE) 25 MG tablet Take 75 mg by mouth 2 times daily     hydrALAZINE (APRESOLINE) 50 MG tablet Take 50 mg by mouth     isosorbide mononitrate (IMDUR) 30 MG 24 hr tablet Take 30 mg by mouth daily Give with 120 mg tab     isosorbide mononitrate CR (IMDUR) 120 MG 24 HR ER tablet Take 120 mg by mouth     metFORMIN (GLUCOPHAGE XR) 500 MG 24 hr tablet Take 1,000 mg by mouth daily (with dinner)     Multiple Vitamins-Minerals (SYSTANE ICAPS AREDS2) CAPS Take 1 capsule by mouth daily     nitroglycerin (NITROSTAT) 0.4 MG SL tablet [NITROGLYCERIN (NITROSTAT) 0.4 MG SL TABLET] Place 0.4 mg under the tongue as needed.     ondansetron (ZOFRAN) 4 MG tablet Take 1 tablet (4 mg) by mouth every 8 hours as needed for nausea or vomiting     pantoprazole (PROTONIX) 40 MG EC tablet Take 40 mg by mouth daily     pioglitazone (ACTOS) 45 MG tablet Take 45 mg by mouth     senna (SENOKOT) 8.6 MG tablet Take 8.6-17.2 mg by mouth     terazosin (HYTRIN) 10 MG capsule Take 1 capsule by mouth At Bedtime     vitamins  A,C,E-zinc-copper (ICAPS AREDS) 7,160-113-100 unit-mg-unit TbEC [VITAMINS  A,C,E-ZINC-COPPER (ICAPS AREDS) 7,160-113-100 UNIT-MG-UNIT TBEC] Take 1 capsule by mouth daily.     No current facility-administered medications for this visit.     ALLERGIES:   Allergies   Allergen Reactions     Acetaminophen Nausea and Vomiting     Other reaction(s): Nausea/vomiting     Amlodipine Unknown     Cilostazol Unknown     Hydrocodone      Other reaction(s): Nausea/vomiting     Vicodin [Hydrocodone-Acetaminophen] Unknown     DIET: Regular, regular texture, thin liquids.  Mighty Shake Plus nutritional supplement.    Vitals:    02/09/23 1530   BP: (!) 146/56   Pulse: 70   Resp: 18   Temp: 98.2  F (36.8  C)   SpO2: 94%   Weight: 58.5 kg (129 lb)   Height: 1.676 m (5' 6\")     Wt Readings from Last 4 Encounters:   02/09/23 58.5 kg (129 lb)   02/06/23 59 kg (130 lb)   02/02/23 59 kg (130 lb)   01/03/19 81.6 kg (180 lb) "     Body mass index is 20.82 kg/m .  Body surface area is 1.65 meters squared.    EXAMINATION:   General: Pleasant, alert, and conversant elderly male, lying in bed, in no apparent distress.  He keeps the head of his bed up for comfort, denying any orthopnea.  Head: Normocephalic and atraumatic.   Eyes: PERRLA, sclerae clear.  Bilateral ectropion.  ENT: Moist oral mucosa.  He has his own teeth.  No nasal discharge.  Hearing intact.  Cardiovascular: Regular rate and rhythm with intermittent ectopy (every 5-6 beats) and a 3/6 systolic ejection murmur at the apex..   Respiratory: Lungs clear to auscultation bilaterally.   Abdomen: Nondistended.   Musculoskeletal/Extremities: Age-related arthritic changes.  No peripheral edema.  Integument: No rashes, clinically significant lesions, or skin breakdown.   Cognitive/Psychiatric: Alert and oriented with euthymic affect.    DIAGNOSTICS:   Recent Results (from the past 240 hour(s))   CBC with platelets and differential    Collection Time: 02/06/23  9:50 AM   Result Value Ref Range    WBC Count 6.7 4.0 - 11.0 10e3/uL    RBC Count 3.05 (L) 4.40 - 5.90 10e6/uL    Hemoglobin 10.2 (L) 13.3 - 17.7 g/dL    Hematocrit 31.7 (L) 40.0 - 53.0 %     (H) 78 - 100 fL    MCH 33.4 (H) 26.5 - 33.0 pg    MCHC 32.2 31.5 - 36.5 g/dL    RDW 13.2 10.0 - 15.0 %    Platelet Count 118 (L) 150 - 450 10e3/uL    % Neutrophils 75 %    % Lymphocytes 14 %    % Monocytes 8 %    % Eosinophils 2 %    % Basophils 0 %    % Immature Granulocytes 1 %    NRBCs per 100 WBC 0 <1 /100    Absolute Neutrophils 5.0 1.6 - 8.3 10e3/uL    Absolute Lymphocytes 1.0 0.8 - 5.3 10e3/uL    Absolute Monocytes 0.6 0.0 - 1.3 10e3/uL    Absolute Eosinophils 0.1 0.0 - 0.7 10e3/uL    Absolute Basophils 0.0 0.0 - 0.2 10e3/uL    Absolute Immature Granulocytes 0.1 <=0.4 10e3/uL    Absolute NRBCs 0.0 10e3/uL   Basic metabolic panel    Collection Time: 02/08/23  8:47 AM   Result Value Ref Range    Sodium 135 (L) 136 - 145 mmol/L     Potassium 4.5 3.4 - 5.3 mmol/L    Chloride 96 (L) 98 - 107 mmol/L    Carbon Dioxide (CO2) 24 22 - 29 mmol/L    Anion Gap 15 7 - 15 mmol/L    Urea Nitrogen 66.6 (H) 8.0 - 23.0 mg/dL    Creatinine 1.45 (H) 0.67 - 1.17 mg/dL    Calcium 8.7 8.2 - 9.6 mg/dL    Glucose 270 (H) 70 - 99 mg/dL    GFR Estimate 45 (L) >60 mL/min/1.73m2       ASSESSMENT/Plan:      ICD-10-CM    1. History of non-ST elevation myocardial infarction (NSTEMI)  I25.2       2. History of cholecystitis  Z87.19       3. Type 2 diabetes mellitus without complication, without long-term current use of insulin (H)  E11.9       4. C. difficile colitis  A04.72       5. Stage 3a chronic kidney disease (H)  N18.31       6. Coronary artery disease involving coronary bypass graft of native heart without angina pectoris  I25.810       7. SSS (sick sinus syndrome) (H)  I49.5       8. Cardiac pacemaker in situ  Z95.0       9. Chronic heart failure with preserved ejection fraction (HFpEF) (H)  I50.32       10. PAD (peripheral artery disease) (H)  I73.9       11. Thrombocytopenia (H)  D69.6       12. Macrocytic anemia  D53.9           CHANGES:    None.    CARE PLAN:    The care plan, medications, vital signs, orders, and nursing notes have been reviewed, and all orders signed. Changes to care plan, if any, as noted. Otherwise, continue current plan of care.  Total time of this encounter was 36 minutes, assessing frequent falls and C. difficile colitis.    The above has been created using voice recognition software. Please be aware that this may unintentionally  produce inaccuracies and/or nonsensical sentences.      Electronically signed by: PAULINA Manjarrez CNP

## 2023-02-13 ENCOUNTER — TRANSITIONAL CARE UNIT VISIT (OUTPATIENT)
Dept: GERIATRICS | Facility: CLINIC | Age: 88
End: 2023-02-13
Payer: MEDICARE

## 2023-02-13 VITALS
HEART RATE: 63 BPM | TEMPERATURE: 97.9 F | HEIGHT: 66 IN | OXYGEN SATURATION: 96 % | DIASTOLIC BLOOD PRESSURE: 52 MMHG | BODY MASS INDEX: 20.73 KG/M2 | SYSTOLIC BLOOD PRESSURE: 136 MMHG | WEIGHT: 129 LBS | RESPIRATION RATE: 18 BRPM

## 2023-02-13 DIAGNOSIS — I50.32 CHRONIC HEART FAILURE WITH PRESERVED EJECTION FRACTION (HFPEF) (H): ICD-10-CM

## 2023-02-13 DIAGNOSIS — I49.5 SSS (SICK SINUS SYNDROME) (H): ICD-10-CM

## 2023-02-13 DIAGNOSIS — E11.9 TYPE 2 DIABETES MELLITUS WITHOUT COMPLICATION, WITHOUT LONG-TERM CURRENT USE OF INSULIN (H): ICD-10-CM

## 2023-02-13 DIAGNOSIS — I25.810 CORONARY ARTERY DISEASE INVOLVING CORONARY BYPASS GRAFT OF NATIVE HEART WITHOUT ANGINA PECTORIS: ICD-10-CM

## 2023-02-13 DIAGNOSIS — D53.9 MACROCYTIC ANEMIA: ICD-10-CM

## 2023-02-13 DIAGNOSIS — I73.9 PAD (PERIPHERAL ARTERY DISEASE) (H): ICD-10-CM

## 2023-02-13 DIAGNOSIS — D69.6 THROMBOCYTOPENIA (H): ICD-10-CM

## 2023-02-13 DIAGNOSIS — Z87.19 HISTORY OF CHOLECYSTITIS: ICD-10-CM

## 2023-02-13 DIAGNOSIS — I25.2 HISTORY OF NON-ST ELEVATION MYOCARDIAL INFARCTION (NSTEMI): Primary | ICD-10-CM

## 2023-02-13 DIAGNOSIS — N18.31 STAGE 3A CHRONIC KIDNEY DISEASE (H): ICD-10-CM

## 2023-02-13 DIAGNOSIS — A04.72 C. DIFFICILE COLITIS: ICD-10-CM

## 2023-02-13 DIAGNOSIS — Z95.0 CARDIAC PACEMAKER IN SITU: ICD-10-CM

## 2023-02-13 PROCEDURE — 99309 SBSQ NF CARE MODERATE MDM 30: CPT | Performed by: NURSE PRACTITIONER

## 2023-02-13 NOTE — LETTER
2023        RE: Barak Berry  [address] [place] [address]        [facility]    Name:   Barak Berry  :   1932  MRN:    [id number]     Facility:   [facility] ([facility]) [[id number]]   Room: [place] [address]  Code Status: DNR and POLST AVAILABLE -     DOS:  2023    PCP:  Karel Petit MD     CHIEF COMPLAINT / REASON FOR VISIT:  Chief Complaint   Patient presents with     Clinic Care Coordination - Follow-up     Acute cholecystitis, NSTEMI          [facility] from 2022 until 2023 (acute cholecystitis, NSTEMI)  [facility] from 2023 until 2023 (mechanical fall)      HPI: Barak is a 89 year old male with a past medical history significant for diabetes mellitus type 2, essential hypertension, CKD stage III, CAD (s/p CABG , ), chronic HFpEF, sick sinus syndrome (s/p pacemaker), PAD, dyslipidemia, and GERD, who presented to the ED with nausea on 2022.  He had been in his normal state of health until that afternoon, will when he developed severe nausea and vomiting.  He subsequently developed left-sided chest pain, and heavy pressure in nature (no radiation to arm/jaw), and he was having low back pain as well.  Seen by cardiology, noting: NSTEMI with known residual CAD which would require complicated PCI.  TTE with EF 55%, no regional wall motion abnormalities, mild to moderate AV stenosis with mild AV regurgitation but no significant change compared to echo of 2022.  Given his history, cardiology opted for medical management, beginning with 48 hours of IV heparin.  He is to continue ASA, atorvastatin, carvedilol, clopidogrel, and isosorbide mononitrate.  He was also discharged on chlorthalidone.  Seen by general surgery re: Imaging concerning for cholecystitis.  Given that he is a very high surgical risk, there were no plans for surgical or IR intervention, and he had been doing well on antibiotic therapy.   "Surgery recommended 14 days of antibiotic therapy and discharged on cefdinir and metronidazole.      He suffered a mechanical fall on 01/30/2023 and was admitted to [facility] after a fall at home.  He struggled to get up from the bathroom floor.  He described a feeling like his legs were giving out, and he did sustain a contusion to the left side of his head.  He had had multiple falls in the 2 weeks prior to his admission.  He reported falling 4 times before deciding he needed therapy.  EKG showed paced rhythm, BMP mild hyperglycemia and baseline creatinine of 1.48.  CBC unremarkable except for baseline hemoglobin 10.7 and known thrombocytopenia.  Troponin was elevated though downtrending.  He underwent a CT head revealing no acute intracranial hemorrhage or evidence of infarction.  Otherwise, typical age-related chronic small vessel disease and moderate generalized volume loss.  CT cervical spine without evidence of vertebral fracture or body height loss.  No canal stenosis.  The following day, per his earlier plans, he was admitted to this facility.      CURRENT/RECENT TCU ISSUES    Disposition  -- On 02/01, he was started on vancomycin for C. difficile, and Lomotil was discontinued.  He is having some loose stools now, \"though not as much.\"  -- Questions having suffered an NSTEMI.  He said, \"they didn't convince me [that I had a heart attack].\"  -- He has felt weaker since his hospitalization in December.  He appreciates working with therapies to try and strengthen his legs.  As mentioned, this isn't something he had planned.  -- Asked about any possible memory issues earlier, he responded, \"not major, some.\"  -- Knees tend to \"knot up\" when walking, but they eventually loosen up.  They bother him despite bilateral knee replacements.  -- Complaining of some lower back pain today.  I believe they will be using the Jacuzzi today.  -- He is complaining he hasn't had a shower in 2 weeks.  Showers are generally " "scheduled at least once per week, and not sure how his was missed.    Frequent falls  -- He says he fell 4 times since his admission to the TCU, the last fall occurring on 02/10.  I did mention this to the nurse manager.  No need to write an order.  The reason?  He uses the call light when he needs to urinate, but he ultimately cannot wait.  He sustained a small skin tear to the elbow.  He does have a history of lumbar vertebral fracture.    Diabetes mellitus type 2  -- His blood sugars are \"way too high,\" according to the patient and his nurse.  They are running in the 200s and occasional 300s.  -- On 02/01, Dr. Rosenstein started him on 500 mg of metformin XR daily.  I believe this had been stopped earlier as possibly the cause of diarrhea.  As he was able to tolerate that dose, we increased it to 1000 mg daily.  He also receives glipizide, and we may wish to take him off, depending on metformin's efficacy.  -- He also been taken off of pioglitazone, and we should consider reevaluating and possibly restarting, as he had been tolerating it well prior to his hospitalization.  We will resume it today at 30 mg daily.  -- Last A1c prior to admission was 7.2%.  -- Has previously had hypoglycemia while on insulin.    Chronic HFpEF  -- On both chlorthalidone and furosemide.  Denies any orthopnea, though he does like the head of his bed elevated for comfort.    C. difficile colitis/abdominal discomfort  -- As noted above, on extended course of vancomycin.  I believe he had this during his previous hospitalization.    -- Nursing staff reported complaints of nausea and stomachache on 02/07.  He did have a formed BM at that morning.  He does have a history of cholecystitis where symptoms were similar.  We did order an abdominal ultrasound and also ondansetron for the nausea.  Further, we ordered that his metformin be given with food.   -- Abdominal x-rays were ordered instead of the ultrasound initially, and it showed " "\"nonspecific bowel gas pattern without obstruction; mild increase of feces;; no renal stones; mild gastric distention.\"  Ultrasound pending.  -- Abdominal pain/discomfort has resolved, and stools are formed.    Chronic kidney disease  -- Stage IIIa.  Care observed with nephrotoxic agents, diuretics.    Peripheral arterial disease  -- On clopidogrel and aspirin.    Family  -- His wife  2 years ago.  They were  for 62 years.  It still affects him in some ways.    Discharge planning  -- Has been living alone in a house, with his son and daughter visiting about 3 times per week.  His last care conference is scheduled for tomorrow ().  I believe discharge may be planned for next week.      ROS: In addition to symptoms noted above, he has also endorsed dry eyes and has bilateral ectropion.  Sleep, he says, can be \"hit or miss.\"  Some days he sleeps well, others not.   10 point ROS of systems including Constitutional, Eyes, Respiratory, Cardiovascular, Gastroenterology, Genitourinary, Integumentary, Muscularskeletal, Psychiatric (denies any depressive symptoms) were all negative except for pertinent positives noted in my HPI.      History reviewed. No pertinent past medical history.           History reviewed. No pertinent family history.     Social History     Socioeconomic History     Marital status:      Spouse name: None     Number of children: None     Years of education: None     Highest education level: None   Tobacco Use     Smoking status: Former     Packs/day: 1.00     Years: 45.00     Pack years: 45.00     Types: Cigarettes     Quit date: 1960     Years since quittin.1     Smokeless tobacco: Never   Substance and Sexual Activity     Alcohol use: Yes     Alcohol/week: 1.0 standard drink     Comment: Alcoholic Drinks/day: rare     Drug use: No       MEDICATIONS: Reviewed from the MAR, physician orders, and/or earlier progress notes.  MED REC REQUIRED  Post Medication " Reconciliation Status: medication reconcilation previously completed during another office visit    Current Outpatient Medications   Medication Sig     acetaminophen (TYLENOL) 650 MG CR tablet Take 1,300 mg by mouth     aspirin 81 MG EC tablet [ASPIRIN 81 MG EC TABLET] Take 81 mg by mouth daily.     atorvastatin (LIPITOR) 40 MG tablet Take 40 mg by mouth     carvedilol (COREG) 25 MG tablet Take 25 mg by mouth     chlorthalidone (HYGROTON) 25 MG tablet Take 25 mg by mouth     citalopram (CELEXA) 10 MG tablet Take 30 mg by mouth     clopidogrel (PLAVIX) 75 MG tablet Take 1 tablet by mouth every morning     furosemide (LASIX) 20 MG tablet Take 20 mg by mouth     gabapentin (NEURONTIN) 300 MG capsule Take 300 mg by mouth     glipiZIDE (GLUCOTROL XL) 5 MG 24 hr tablet Take 5 mg by mouth daily     hydrALAZINE (APRESOLINE) 25 MG tablet Take 75 mg by mouth 2 times daily     hydrALAZINE (APRESOLINE) 50 MG tablet Take 50 mg by mouth     isosorbide mononitrate (IMDUR) 30 MG 24 hr tablet Take 30 mg by mouth daily Give with 120 mg tab     isosorbide mononitrate CR (IMDUR) 120 MG 24 HR ER tablet Take 120 mg by mouth     metFORMIN (GLUCOPHAGE XR) 500 MG 24 hr tablet Take 1,000 mg by mouth daily (with dinner)     Multiple Vitamins-Minerals (SYSTANE ICAPS AREDS2) CAPS Take 1 capsule by mouth daily     nitroglycerin (NITROSTAT) 0.4 MG SL tablet [NITROGLYCERIN (NITROSTAT) 0.4 MG SL TABLET] Place 0.4 mg under the tongue as needed.     ondansetron (ZOFRAN) 4 MG tablet Take 1 tablet (4 mg) by mouth every 8 hours as needed for nausea or vomiting     pantoprazole (PROTONIX) 40 MG EC tablet Take 40 mg by mouth daily     pioglitazone (ACTOS) 45 MG tablet Take 45 mg by mouth     senna (SENOKOT) 8.6 MG tablet Take 8.6-17.2 mg by mouth     terazosin (HYTRIN) 10 MG capsule Take 1 capsule by mouth At Bedtime     vitamins  A,C,E-zinc-copper (ICAPS AREDS) 7,160-113-100 unit-mg-unit TbEC [VITAMINS  A,C,E-ZINC-COPPER (ICAPS AREDS) 7,160-113-100  "UNIT-MG-UNIT TBEC] Take 1 capsule by mouth daily.     No current facility-administered medications for this visit.     ALLERGIES:   Allergies   Allergen Reactions     Acetaminophen Nausea and Vomiting     Other reaction(s): Nausea/vomiting     Amlodipine Unknown     Cilostazol Unknown     Hydrocodone      Other reaction(s): Nausea/vomiting     Vicodin [Hydrocodone-Acetaminophen] Unknown     DIET: Regular, regular texture, thin liquids.  Mighty Shake Plus nutritional supplement.    Vitals:    02/13/23 1403   BP: 136/52   Pulse: 63   Resp: 18   Temp: 97.9  F (36.6  C)   SpO2: 96%   Weight: 58.5 kg (129 lb)   Height: 1.676 m (5' 6\")     Wt Readings from Last 4 Encounters:   02/13/23 58.5 kg (129 lb)   02/09/23 58.5 kg (129 lb)   02/06/23 59 kg (130 lb)   02/02/23 59 kg (130 lb)     Body mass index is 20.82 kg/m .  Body surface area is 1.65 meters squared.    EXAMINATION:   General: Pleasant, alert, and conversant elderly male, lying in bed, in no apparent distress.  He keeps the head of his bed up for comfort, denying any orthopnea.  Head: Normocephalic and atraumatic.   Eyes: PERRLA, sclerae clear.  Bilateral ectropion.  ENT: Moist oral mucosa.  He has his own teeth.  No nasal discharge.  Hearing intact.  Cardiovascular: Regular rate and rhythm with a 3/6 systolic ejection murmur at the apex..  I could previously appreciate intermittent ectopy (every 5-6 beats).  Respiratory: Lungs CTAB.   Abdomen: Nondistended.   Musculoskeletal/Extremities: Age-related arthritic changes.  No peripheral edema.  Integument: No rashes, clinically significant lesions, or skin breakdown.   Cognitive/Psychiatric: Alert and oriented with euthymic affect.    DIAGNOSTICS:   Recent Results (from the past 240 hour(s))   CBC with platelets and differential    Collection Time: 02/06/23  9:50 AM   Result Value Ref Range    WBC Count 6.7 4.0 - 11.0 10e3/uL    RBC Count 3.05 (L) 4.40 - 5.90 10e6/uL    Hemoglobin 10.2 (L) 13.3 - 17.7 g/dL    " Hematocrit 31.7 (L) 40.0 - 53.0 %     (H) 78 - 100 fL    MCH 33.4 (H) 26.5 - 33.0 pg    MCHC 32.2 31.5 - 36.5 g/dL    RDW 13.2 10.0 - 15.0 %    Platelet Count 118 (L) 150 - 450 10e3/uL    % Neutrophils 75 %    % Lymphocytes 14 %    % Monocytes 8 %    % Eosinophils 2 %    % Basophils 0 %    % Immature Granulocytes 1 %    NRBCs per 100 WBC 0 <1 /100    Absolute Neutrophils 5.0 1.6 - 8.3 10e3/uL    Absolute Lymphocytes 1.0 0.8 - 5.3 10e3/uL    Absolute Monocytes 0.6 0.0 - 1.3 10e3/uL    Absolute Eosinophils 0.1 0.0 - 0.7 10e3/uL    Absolute Basophils 0.0 0.0 - 0.2 10e3/uL    Absolute Immature Granulocytes 0.1 <=0.4 10e3/uL    Absolute NRBCs 0.0 10e3/uL   Basic metabolic panel    Collection Time: 02/08/23  8:47 AM   Result Value Ref Range    Sodium 135 (L) 136 - 145 mmol/L    Potassium 4.5 3.4 - 5.3 mmol/L    Chloride 96 (L) 98 - 107 mmol/L    Carbon Dioxide (CO2) 24 22 - 29 mmol/L    Anion Gap 15 7 - 15 mmol/L    Urea Nitrogen 66.6 (H) 8.0 - 23.0 mg/dL    Creatinine 1.45 (H) 0.67 - 1.17 mg/dL    Calcium 8.7 8.2 - 9.6 mg/dL    Glucose 270 (H) 70 - 99 mg/dL    GFR Estimate 45 (L) >60 mL/min/1.73m2       ASSESSMENT/Plan:      ICD-10-CM    1. History of non-ST elevation myocardial infarction (NSTEMI)  I25.2       2. History of cholecystitis  Z87.19       3. Type 2 diabetes mellitus without complication, without long-term current use of insulin (H)  E11.9       4. C. difficile colitis  A04.72       5. Stage 3a chronic kidney disease (H)  N18.31       6. Coronary artery disease involving coronary bypass graft of native heart without angina pectoris  I25.810       7. SSS (sick sinus syndrome) (H)  I49.5       8. Cardiac pacemaker in situ  Z95.0       9. Chronic heart failure with preserved ejection fraction (HFpEF) (H)  I50.32       10. PAD (peripheral artery disease) (H)  I73.9       11. Thrombocytopenia (H)  D69.6       12. Macrocytic anemia  D53.9           CHANGES:    1.  Restart pioglitazone 30 mg daily.    CARE  PLAN:    The care plan, medications, vital signs, orders, and nursing notes have been reviewed, and all orders signed. Changes to care plan, if any, as noted. Otherwise, continue current plan of care.  Total time spent for this encounter was 36 minutes, including time spent explaining how we would manage his diabetes.    The above has been created using voice recognition software. Please be aware that this may unintentionally  produce inaccuracies and/or nonsensical sentences.      Electronically signed by: PAULINA Manjarrez CNP        Sincerely,        PAULINA Manjarrez CNP

## 2023-02-14 ENCOUNTER — LAB REQUISITION (OUTPATIENT)
Dept: LAB | Facility: CLINIC | Age: 88
End: 2023-02-14
Payer: MEDICARE

## 2023-02-14 DIAGNOSIS — N18.30 CHRONIC KIDNEY DISEASE, STAGE 3 UNSPECIFIED (H): ICD-10-CM

## 2023-02-15 LAB
ANION GAP SERPL CALCULATED.3IONS-SCNC: 14 MMOL/L (ref 7–15)
BUN SERPL-MCNC: 63.1 MG/DL (ref 8–23)
CALCIUM SERPL-MCNC: 9.1 MG/DL (ref 8.2–9.6)
CHLORIDE SERPL-SCNC: 101 MMOL/L (ref 98–107)
CREAT SERPL-MCNC: 1.3 MG/DL (ref 0.67–1.17)
DEPRECATED HCO3 PLAS-SCNC: 23 MMOL/L (ref 22–29)
GFR SERPL CREATININE-BSD FRML MDRD: 52 ML/MIN/1.73M2
GLUCOSE SERPL-MCNC: 232 MG/DL (ref 70–99)
POTASSIUM SERPL-SCNC: 4.6 MMOL/L (ref 3.4–5.3)
SODIUM SERPL-SCNC: 138 MMOL/L (ref 136–145)

## 2023-02-15 PROCEDURE — 36415 COLL VENOUS BLD VENIPUNCTURE: CPT | Performed by: FAMILY MEDICINE

## 2023-02-15 PROCEDURE — 80048 BASIC METABOLIC PNL TOTAL CA: CPT | Performed by: FAMILY MEDICINE

## 2023-02-15 PROCEDURE — P9604 ONE-WAY ALLOW PRORATED TRIP: HCPCS | Performed by: FAMILY MEDICINE

## 2023-02-15 NOTE — PROGRESS NOTES
[facility]    Name:   Barak Berry  :   1932  MRN:    [id number]     Facility:   [facility] ([facility]) [[id number]]   Room: [facility] / [place] [address]  Code Status: DNR and POLST AVAILABLE -     DOS:  2023    PCP:  Karel Petit MD     CHIEF COMPLAINT / REASON FOR VISIT:  Chief Complaint   Patient presents with     Clinic Care Coordination - Follow-up     Acute cholecystitis, NSTEMI          [facility] from 2022 until 2023 (acute cholecystitis, NSTEMI)  [facility] from 2023 until 2023 (mechanical fall)      HPI: Barak is a 89 year old male with a past medical history significant for diabetes mellitus type 2, essential hypertension, CKD stage III, CAD (s/p CABG , ), chronic HFpEF, sick sinus syndrome (s/p pacemaker), PAD, dyslipidemia, and GERD, who presented to the ED with nausea on 2022.  He had been in his normal state of health until that afternoon, will when he developed severe nausea and vomiting.  He subsequently developed left-sided chest pain, and heavy pressure in nature (no radiation to arm/jaw), and he was having low back pain as well.  Seen by cardiology, noting: NSTEMI with known residual CAD which would require complicated PCI.  TTE with EF 55%, no regional wall motion abnormalities, mild to moderate AV stenosis with mild AV regurgitation but no significant change compared to echo of 2022.  Given his history, cardiology opted for medical management, beginning with 48 hours of IV heparin.  He is to continue ASA, atorvastatin, carvedilol, clopidogrel, and isosorbide mononitrate.  He was also discharged on chlorthalidone.  Seen by general surgery re: Imaging concerning for cholecystitis.  Given that he is a very high surgical risk, there were no plans for surgical or IR intervention, and he had been doing well on antibiotic therapy.  Surgery recommended 14 days of antibiotic therapy and discharged on cefdinir and  "metronidazole.      He suffered a mechanical fall on 01/30/2023 and was admitted to [facility] after a fall at home.  He struggled to get up from the bathroom floor.  He described a feeling like his legs were giving out, and he did sustain a contusion to the left side of his head.  He had had multiple falls in the 2 weeks prior to his admission.  He reported falling 4 times before deciding he needed therapy.  EKG showed paced rhythm, BMP mild hyperglycemia and baseline creatinine of 1.48.  CBC unremarkable except for baseline hemoglobin 10.7 and known thrombocytopenia.  Troponin was elevated though downtrending.  He underwent a CT head revealing no acute intracranial hemorrhage or evidence of infarction.  Otherwise, typical age-related chronic small vessel disease and moderate generalized volume loss.  CT cervical spine without evidence of vertebral fracture or body height loss.  No canal stenosis.  The following day, per his earlier plans, he was admitted to this facility.      CURRENT/RECENT TCU ISSUES    Disposition  -- On 02/01, he was started on vancomycin for C. difficile, and Lomotil was discontinued.  He is having some loose stools now, \"though not as much.\"  -- Questions having suffered an NSTEMI.  He said, \"they didn't convince me [that I had a heart attack].\"  -- He has felt weaker since his hospitalization in December.  He appreciates working with therapies to try and strengthen his legs.  As mentioned, this isn't something he had planned.  -- Asked about any possible memory issues earlier, he responded, \"not major, some.\"  -- Knees tend to \"knot up\" when walking, but they eventually loosen up.  They bother him despite bilateral knee replacements.  -- Complaining of some lower back pain today.  I believe they will be using the Jacuzzi today.  -- He is complaining he hasn't had a shower in 2 weeks.  Showers are generally scheduled at least once per week, and not sure how his was missed.    Frequent " "falls  -- He says he fell 4 times since his admission to the TCU, the last fall occurring on 02/10.  I did mention this to the nurse manager.  No need to write an order.  The reason?  He uses the call light when he needs to urinate, but he ultimately cannot wait.  He sustained a small skin tear to the elbow.  He does have a history of lumbar vertebral fracture.    Diabetes mellitus type 2  -- His blood sugars are \"way too high,\" according to the patient and his nurse.  They are running in the 200s and occasional 300s.  -- On 02/01, Dr. Rosenstein started him on 500 mg of metformin XR daily.  I believe this had been stopped earlier as possibly the cause of diarrhea.  As he was able to tolerate that dose, we increased it to 1000 mg daily.  He also receives glipizide, and we may wish to take him off, depending on metformin's efficacy.  -- He also been taken off of pioglitazone, and we should consider reevaluating and possibly restarting, as he had been tolerating it well prior to his hospitalization.  We will resume it today at 30 mg daily.  -- Last A1c prior to admission was 7.2%.  -- Has previously had hypoglycemia while on insulin.    Chronic HFpEF  -- On both chlorthalidone and furosemide.  Denies any orthopnea, though he does like the head of his bed elevated for comfort.    C. difficile colitis/abdominal discomfort  -- As noted above, on extended course of vancomycin.  I believe he had this during his previous hospitalization.    -- Nursing staff reported complaints of nausea and stomachache on 02/07.  He did have a formed BM at that morning.  He does have a history of cholecystitis where symptoms were similar.  We did order an abdominal ultrasound and also ondansetron for the nausea.  Further, we ordered that his metformin be given with food.   -- Abdominal x-rays were ordered instead of the ultrasound initially, and it showed \"nonspecific bowel gas pattern without obstruction; mild increase of feces;; no renal " "stones; mild gastric distention.\"  Ultrasound pending.  -- Abdominal pain/discomfort has resolved, and stools are formed.    Chronic kidney disease  -- Stage IIIa.  Care observed with nephrotoxic agents, diuretics.    Peripheral arterial disease  -- On clopidogrel and aspirin.    Family  -- His wife  2 years ago.  They were  for 62 years.  It still affects him in some ways.    Discharge planning  -- Has been living alone in a house, with his son and daughter visiting about 3 times per week.  His last care conference is scheduled for tomorrow ().  I believe discharge may be planned for next week.      ROS: In addition to symptoms noted above, he has also endorsed dry eyes and has bilateral ectropion.  Sleep, he says, can be \"hit or miss.\"  Some days he sleeps well, others not.   10 point ROS of systems including Constitutional, Eyes, Respiratory, Cardiovascular, Gastroenterology, Genitourinary, Integumentary, Muscularskeletal, Psychiatric (denies any depressive symptoms) were all negative except for pertinent positives noted in my HPI.      History reviewed. No pertinent past medical history.           History reviewed. No pertinent family history.     Social History     Socioeconomic History     Marital status:      Spouse name: None     Number of children: None     Years of education: None     Highest education level: None   Tobacco Use     Smoking status: Former     Packs/day: 1.00     Years: 45.00     Pack years: 45.00     Types: Cigarettes     Quit date: 1960     Years since quittin.1     Smokeless tobacco: Never   Substance and Sexual Activity     Alcohol use: Yes     Alcohol/week: 1.0 standard drink     Comment: Alcoholic Drinks/day: rare     Drug use: No       MEDICATIONS: Reviewed from the MAR, physician orders, and/or earlier progress notes.  MED REC REQUIRED  Post Medication Reconciliation Status: medication reconcilation previously completed during another office " visit    Current Outpatient Medications   Medication Sig     acetaminophen (TYLENOL) 650 MG CR tablet Take 1,300 mg by mouth     aspirin 81 MG EC tablet [ASPIRIN 81 MG EC TABLET] Take 81 mg by mouth daily.     atorvastatin (LIPITOR) 40 MG tablet Take 40 mg by mouth     carvedilol (COREG) 25 MG tablet Take 25 mg by mouth     chlorthalidone (HYGROTON) 25 MG tablet Take 25 mg by mouth     citalopram (CELEXA) 10 MG tablet Take 30 mg by mouth     clopidogrel (PLAVIX) 75 MG tablet Take 1 tablet by mouth every morning     furosemide (LASIX) 20 MG tablet Take 20 mg by mouth     gabapentin (NEURONTIN) 300 MG capsule Take 300 mg by mouth     glipiZIDE (GLUCOTROL XL) 5 MG 24 hr tablet Take 5 mg by mouth daily     hydrALAZINE (APRESOLINE) 25 MG tablet Take 75 mg by mouth 2 times daily     hydrALAZINE (APRESOLINE) 50 MG tablet Take 50 mg by mouth     isosorbide mononitrate (IMDUR) 30 MG 24 hr tablet Take 30 mg by mouth daily Give with 120 mg tab     isosorbide mononitrate CR (IMDUR) 120 MG 24 HR ER tablet Take 120 mg by mouth     metFORMIN (GLUCOPHAGE XR) 500 MG 24 hr tablet Take 1,000 mg by mouth daily (with dinner)     Multiple Vitamins-Minerals (SYSTANE ICAPS AREDS2) CAPS Take 1 capsule by mouth daily     nitroglycerin (NITROSTAT) 0.4 MG SL tablet [NITROGLYCERIN (NITROSTAT) 0.4 MG SL TABLET] Place 0.4 mg under the tongue as needed.     ondansetron (ZOFRAN) 4 MG tablet Take 1 tablet (4 mg) by mouth every 8 hours as needed for nausea or vomiting     pantoprazole (PROTONIX) 40 MG EC tablet Take 40 mg by mouth daily     pioglitazone (ACTOS) 45 MG tablet Take 45 mg by mouth     senna (SENOKOT) 8.6 MG tablet Take 8.6-17.2 mg by mouth     terazosin (HYTRIN) 10 MG capsule Take 1 capsule by mouth At Bedtime     vitamins  A,C,E-zinc-copper (ICAPS AREDS) 7,160-113-100 unit-mg-unit TbEC [VITAMINS  A,C,E-ZINC-COPPER (ICAPS AREDS) 7,160-113-100 UNIT-MG-UNIT TBEC] Take 1 capsule by mouth daily.     No current facility-administered  "medications for this visit.     ALLERGIES:   Allergies   Allergen Reactions     Acetaminophen Nausea and Vomiting     Other reaction(s): Nausea/vomiting     Amlodipine Unknown     Cilostazol Unknown     Hydrocodone      Other reaction(s): Nausea/vomiting     Vicodin [Hydrocodone-Acetaminophen] Unknown     DIET: Regular, regular texture, thin liquids.  Mighty Shake Plus nutritional supplement.    Vitals:    02/13/23 1403   BP: 136/52   Pulse: 63   Resp: 18   Temp: 97.9  F (36.6  C)   SpO2: 96%   Weight: 58.5 kg (129 lb)   Height: 1.676 m (5' 6\")     Wt Readings from Last 4 Encounters:   02/13/23 58.5 kg (129 lb)   02/09/23 58.5 kg (129 lb)   02/06/23 59 kg (130 lb)   02/02/23 59 kg (130 lb)     Body mass index is 20.82 kg/m .  Body surface area is 1.65 meters squared.    EXAMINATION:   General: Pleasant, alert, and conversant elderly male, lying in bed, in no apparent distress.  He keeps the head of his bed up for comfort, denying any orthopnea.  Head: Normocephalic and atraumatic.   Eyes: PERRLA, sclerae clear.  Bilateral ectropion.  ENT: Moist oral mucosa.  He has his own teeth.  No nasal discharge.  Hearing intact.  Cardiovascular: Regular rate and rhythm with a 3/6 systolic ejection murmur at the apex..  I could previously appreciate intermittent ectopy (every 5-6 beats).  Respiratory: Lungs CTAB.   Abdomen: Nondistended.   Musculoskeletal/Extremities: Age-related arthritic changes.  No peripheral edema.  Integument: No rashes, clinically significant lesions, or skin breakdown.   Cognitive/Psychiatric: Alert and oriented with euthymic affect.    DIAGNOSTICS:   Recent Results (from the past 240 hour(s))   CBC with platelets and differential    Collection Time: 02/06/23  9:50 AM   Result Value Ref Range    WBC Count 6.7 4.0 - 11.0 10e3/uL    RBC Count 3.05 (L) 4.40 - 5.90 10e6/uL    Hemoglobin 10.2 (L) 13.3 - 17.7 g/dL    Hematocrit 31.7 (L) 40.0 - 53.0 %     (H) 78 - 100 fL    MCH 33.4 (H) 26.5 - 33.0 pg    " MCHC 32.2 31.5 - 36.5 g/dL    RDW 13.2 10.0 - 15.0 %    Platelet Count 118 (L) 150 - 450 10e3/uL    % Neutrophils 75 %    % Lymphocytes 14 %    % Monocytes 8 %    % Eosinophils 2 %    % Basophils 0 %    % Immature Granulocytes 1 %    NRBCs per 100 WBC 0 <1 /100    Absolute Neutrophils 5.0 1.6 - 8.3 10e3/uL    Absolute Lymphocytes 1.0 0.8 - 5.3 10e3/uL    Absolute Monocytes 0.6 0.0 - 1.3 10e3/uL    Absolute Eosinophils 0.1 0.0 - 0.7 10e3/uL    Absolute Basophils 0.0 0.0 - 0.2 10e3/uL    Absolute Immature Granulocytes 0.1 <=0.4 10e3/uL    Absolute NRBCs 0.0 10e3/uL   Basic metabolic panel    Collection Time: 02/08/23  8:47 AM   Result Value Ref Range    Sodium 135 (L) 136 - 145 mmol/L    Potassium 4.5 3.4 - 5.3 mmol/L    Chloride 96 (L) 98 - 107 mmol/L    Carbon Dioxide (CO2) 24 22 - 29 mmol/L    Anion Gap 15 7 - 15 mmol/L    Urea Nitrogen 66.6 (H) 8.0 - 23.0 mg/dL    Creatinine 1.45 (H) 0.67 - 1.17 mg/dL    Calcium 8.7 8.2 - 9.6 mg/dL    Glucose 270 (H) 70 - 99 mg/dL    GFR Estimate 45 (L) >60 mL/min/1.73m2       ASSESSMENT/Plan:      ICD-10-CM    1. History of non-ST elevation myocardial infarction (NSTEMI)  I25.2       2. History of cholecystitis  Z87.19       3. Type 2 diabetes mellitus without complication, without long-term current use of insulin (H)  E11.9       4. C. difficile colitis  A04.72       5. Stage 3a chronic kidney disease (H)  N18.31       6. Coronary artery disease involving coronary bypass graft of native heart without angina pectoris  I25.810       7. SSS (sick sinus syndrome) (H)  I49.5       8. Cardiac pacemaker in situ  Z95.0       9. Chronic heart failure with preserved ejection fraction (HFpEF) (H)  I50.32       10. PAD (peripheral artery disease) (H)  I73.9       11. Thrombocytopenia (H)  D69.6       12. Macrocytic anemia  D53.9           CHANGES:    1.  Restart pioglitazone 30 mg daily.    CARE PLAN:    The care plan, medications, vital signs, orders, and nursing notes have been reviewed,  and all orders signed. Changes to care plan, if any, as noted. Otherwise, continue current plan of care.  Total time spent for this encounter was 36 minutes, including time spent explaining how we would manage his diabetes.    The above has been created using voice recognition software. Please be aware that this may unintentionally  produce inaccuracies and/or nonsensical sentences.      Electronically signed by: PAULINA Manjarrez CNP

## 2023-02-20 ENCOUNTER — TRANSITIONAL CARE UNIT VISIT (OUTPATIENT)
Dept: GERIATRICS | Facility: CLINIC | Age: 88
End: 2023-02-20
Payer: MEDICARE

## 2023-02-20 VITALS
HEART RATE: 64 BPM | OXYGEN SATURATION: 94 % | DIASTOLIC BLOOD PRESSURE: 59 MMHG | WEIGHT: 129.6 LBS | HEIGHT: 66 IN | TEMPERATURE: 97.7 F | RESPIRATION RATE: 18 BRPM | SYSTOLIC BLOOD PRESSURE: 139 MMHG | BODY MASS INDEX: 20.83 KG/M2

## 2023-02-20 DIAGNOSIS — I49.5 SSS (SICK SINUS SYNDROME) (H): ICD-10-CM

## 2023-02-20 DIAGNOSIS — A04.72 C. DIFFICILE COLITIS: ICD-10-CM

## 2023-02-20 DIAGNOSIS — I25.810 CORONARY ARTERY DISEASE INVOLVING CORONARY BYPASS GRAFT OF NATIVE HEART WITHOUT ANGINA PECTORIS: ICD-10-CM

## 2023-02-20 DIAGNOSIS — I50.32 CHRONIC HEART FAILURE WITH PRESERVED EJECTION FRACTION (HFPEF) (H): ICD-10-CM

## 2023-02-20 DIAGNOSIS — Z95.0 CARDIAC PACEMAKER IN SITU: ICD-10-CM

## 2023-02-20 DIAGNOSIS — D53.9 MACROCYTIC ANEMIA: ICD-10-CM

## 2023-02-20 DIAGNOSIS — Z87.19 HISTORY OF CHOLECYSTITIS: ICD-10-CM

## 2023-02-20 DIAGNOSIS — D69.6 THROMBOCYTOPENIA (H): ICD-10-CM

## 2023-02-20 DIAGNOSIS — I25.2 HISTORY OF NON-ST ELEVATION MYOCARDIAL INFARCTION (NSTEMI): Primary | ICD-10-CM

## 2023-02-20 DIAGNOSIS — N18.31 STAGE 3A CHRONIC KIDNEY DISEASE (H): ICD-10-CM

## 2023-02-20 DIAGNOSIS — E11.9 TYPE 2 DIABETES MELLITUS WITHOUT COMPLICATION, WITHOUT LONG-TERM CURRENT USE OF INSULIN (H): ICD-10-CM

## 2023-02-20 DIAGNOSIS — I73.9 PAD (PERIPHERAL ARTERY DISEASE) (H): ICD-10-CM

## 2023-02-20 PROCEDURE — 99309 SBSQ NF CARE MODERATE MDM 30: CPT | Performed by: NURSE PRACTITIONER

## 2023-02-20 NOTE — LETTER
2023        RE: Barak Berry  [address] [place] [address]        [facility]    Name:   Barak Berry  :   1932  MRN:    [id number]     Facility:   [facility] (SNF) [[id number]]   Room: TCU / [place] [address]  Code Status: DNR and POLST AVAILABLE -     DOS:  2023    PCP:  Karel Petit MD     CHIEF COMPLAINT / REASON FOR VISIT:  Chief Complaint   Patient presents with     Clinic Care Coordination - Follow-up     Acute cholecystitis, NSTEMI      [facility] from 2022 until 2023 (acute cholecystitis, NSTEMI)  [facility] from 2023 until 2023 (mechanical fall)  [facility] TCU from 2023 until 2023 (moving into LTC)      HPI: Barak is a 89 year old male with a past medical history significant for diabetes mellitus type 2, essential hypertension, CKD stage III, CAD (s/p CABG , ), chronic HFpEF, sick sinus syndrome (s/p pacemaker), PAD, dyslipidemia, and GERD, who presented to the ED with nausea on 2022.  He had been in his normal state of health until that afternoon, will when he developed severe nausea and vomiting.  He subsequently developed left-sided chest pain, and heavy pressure in nature (no radiation to arm/jaw), and he was having low back pain as well.  Seen by cardiology, noting: NSTEMI with known residual CAD which would require complicated PCI.  TTE with EF 55%, no regional wall motion abnormalities, mild to moderate AV stenosis with mild AV regurgitation but no significant change compared to echo of 2022.  Given his history, cardiology opted for medical management, beginning with 48 hours of IV heparin.  He is to continue ASA, atorvastatin, carvedilol, clopidogrel, and isosorbide mononitrate.  He was also discharged on chlorthalidone.  Seen by general surgery re: Imaging concerning for cholecystitis.  Given that he is a very high surgical risk, there were no plans for surgical  "or IR intervention, and he had been doing well on antibiotic therapy.  Surgery recommended 14 days of antibiotic therapy and discharged on cefdinir and metronidazole.      He suffered a mechanical fall on 01/30/2023 and was admitted to [facility] after a fall at home.  He struggled to get up from the bathroom floor.  He described a feeling like his legs were giving out, and he did sustain a contusion to the left side of his head.  He had had multiple falls in the 2 weeks prior to his admission.  He reported falling 4 times before deciding he needed therapy.  EKG showed paced rhythm, BMP mild hyperglycemia and baseline creatinine of 1.48.  CBC unremarkable except for baseline hemoglobin 10.7 and known thrombocytopenia.  Troponin was elevated though downtrending.  He underwent a CT head revealing no acute intracranial hemorrhage or evidence of infarction.  Otherwise, typical age-related chronic small vessel disease and moderate generalized volume loss.  CT cervical spine without evidence of vertebral fracture or body height loss.  No canal stenosis.  The following day, per his earlier plans, he was admitted to this facility.      CURRENT/RECENT TCU ISSUES    Disposition  -- Son recently signed LTC paperwork.  This will start on 02/21.  He is not too happy about this.  He was walking, but now that he can't go home, he is decided to stop walking.  He reports being depressed.  He tells me, \"I'd rather be home.  They're going to move me to the permanent area eventually.  He has been living in a two-story house for 62 years.  -- On 02/01, started on vancomycin for C. difficile, and Lomotil was discontinued.  He is having some loose stools now, \"though not as much.\"  -- Questions having suffered an NSTEMI.  He said, \"they didn't convince me [that I had a heart attack].\"  -- He has felt weaker since his hospitalization in December.  He appreciates working with therapies to try and strengthen his legs.  As mentioned, " "this isn't something he had planned.  -- Asked about any possible memory issues earlier, he responded, \"not major, some.\"  -- Knees tend to \"knot up\" when walking, but they eventually loosen up.  They bother him despite bilateral knee replacements.    Cognition  -- More confused than he often lets on.  Today, he looks rather dour.  -- He is incontinent of urine.  When asked if he needs to be changed when the incontinence briefs is soaked, he says he's fine.  In essence, he is unaware of his needs.    Frequent falls  -- He says he fell 4 times since his admission to the TCU, the last fall occurring on 02/10.  I did mention this to the nurse manager.  No need to write an order.  The reason?  He uses the call light when he needs to urinate, but he ultimately cannot wait.  He sustained a small skin tear to the elbow.  He does have a history of lumbar vertebral fracture.    Diabetes mellitus type 2  -- His blood sugars are \"way too high,\" according to the patient and his nurse.  They are running in the 200s and occasional 300s.  -- On 02/01, Dr. Rosenstein started him on 500 mg of metformin XR daily.  I believe this had been stopped earlier as possibly the cause of diarrhea.  As he was able to tolerate that dose, we increased it to 1000 mg daily.  He also receives glipizide, and we may wish to take him off, depending on metformin's efficacy.  -- He also been taken off of pioglitazone, and we should consider reevaluating and possibly restarting, as he had been tolerating it well prior to his hospitalization.  We will resume it today at 30 mg daily.  -- Last A1c prior to admission was 7.2%.  -- Has previously had hypoglycemia while on insulin.    Chronic HFpEF  -- On both chlorthalidone and furosemide.  Denies any orthopnea, though he does like the head of his bed elevated for comfort.    C. difficile colitis/abdominal discomfort  -- Testing positive on 01/31/2023.  -- As noted above, placed on extended course of " "vancomycin.  I believe he underwent treatment during his previous hospitalization.    -- Nursing staff reported complaints of nausea and stomachache on .  He did have a formed BM at that morning.  He does have a history of cholecystitis where symptoms were similar.  So we ordered abdominal US and ondansetron for the nausea.  Further, ordered that his metformin be given with food.   -- Abdominal x-rays were done instead of the ultrasound initially, and it showed \"nonspecific bowel gas pattern without obstruction; mild increase of feces;; no renal stones; mild gastric distention.\"  Ultrasound pending.  -- Abdominal pain/discomfort has resolved, and stools are formed.    Chronic kidney disease  -- Stage IIIa.  Care observed with nephrotoxic agents, diuretics.    Peripheral arterial disease  -- On clopidogrel and aspirin.    Family  -- His wife  2 years ago.  They were  for 62 years.  It still affects him in some ways.    Discharge planning  -- Has been living alone in a house, with his son and daughter visiting about 3 times per week.  His last care conference occurred on .  Son signed paperwork for LTC which will start on .      ROS: In addition to symptoms noted above, he has also endorsed dry eyes and has bilateral ectropion.  Sleep, he says, can be \"hit or miss.\"  Some days he sleeps well, others not.   10 point ROS of systems including Constitutional, Eyes, Respiratory, Cardiovascular, Gastroenterology, Genitourinary, Integumentary, Muscularskeletal, Psychiatric (denies any depressive symptoms) were all negative except for pertinent positives noted in my HPI.      History reviewed. No pertinent past medical history.           History reviewed. No pertinent family history.     Social History     Socioeconomic History     Marital status:      Spouse name: None     Number of children: None     Years of education: None     Highest education level: None   Tobacco Use     Smoking status: " Former     Packs/day: 1.00     Years: 45.00     Pack years: 45.00     Types: Cigarettes     Quit date: 1960     Years since quittin.1     Smokeless tobacco: Never   Substance and Sexual Activity     Alcohol use: Yes     Alcohol/week: 1.0 standard drink     Comment: Alcoholic Drinks/day: rare     Drug use: No       MEDICATIONS: Reviewed from the MAR, physician orders, and/or earlier progress notes.  MED REC REQUIRED  Post Medication Reconciliation Status: medication reconcilation previously completed during another office visit    Current Outpatient Medications   Medication Sig     acetaminophen (TYLENOL) 650 MG CR tablet Take 1,300 mg by mouth     aspirin 81 MG EC tablet [ASPIRIN 81 MG EC TABLET] Take 81 mg by mouth daily.     atorvastatin (LIPITOR) 40 MG tablet Take 40 mg by mouth     carvedilol (COREG) 25 MG tablet Take 25 mg by mouth     chlorthalidone (HYGROTON) 25 MG tablet Take 25 mg by mouth     citalopram (CELEXA) 10 MG tablet Take 30 mg by mouth     clopidogrel (PLAVIX) 75 MG tablet Take 1 tablet by mouth every morning     furosemide (LASIX) 20 MG tablet Take 20 mg by mouth     gabapentin (NEURONTIN) 300 MG capsule Take 300 mg by mouth     glipiZIDE (GLUCOTROL XL) 5 MG 24 hr tablet Take 5 mg by mouth daily     hydrALAZINE (APRESOLINE) 25 MG tablet Take 75 mg by mouth 2 times daily     hydrALAZINE (APRESOLINE) 50 MG tablet Take 50 mg by mouth     isosorbide mononitrate (IMDUR) 30 MG 24 hr tablet Take 30 mg by mouth daily Give with 120 mg tab     isosorbide mononitrate CR (IMDUR) 120 MG 24 HR ER tablet Take 120 mg by mouth     metFORMIN (GLUCOPHAGE XR) 500 MG 24 hr tablet Take 1,000 mg by mouth daily (with dinner)     Multiple Vitamins-Minerals (SYSTANE ICAPS AREDS2) CAPS Take 1 capsule by mouth daily     nitroglycerin (NITROSTAT) 0.4 MG SL tablet [NITROGLYCERIN (NITROSTAT) 0.4 MG SL TABLET] Place 0.4 mg under the tongue as needed.     ondansetron (ZOFRAN) 4 MG tablet Take 1 tablet (4 mg) by mouth  "every 8 hours as needed for nausea or vomiting     pantoprazole (PROTONIX) 40 MG EC tablet Take 40 mg by mouth daily     pioglitazone (ACTOS) 45 MG tablet Take 45 mg by mouth     senna (SENOKOT) 8.6 MG tablet Take 8.6-17.2 mg by mouth     terazosin (HYTRIN) 10 MG capsule Take 1 capsule by mouth At Bedtime     vitamins  A,C,E-zinc-copper (ICAPS AREDS) 7,160-113-100 unit-mg-unit TbEC [VITAMINS  A,C,E-ZINC-COPPER (ICAPS AREDS) 7,160-113-100 UNIT-MG-UNIT TBEC] Take 1 capsule by mouth daily.     No current facility-administered medications for this visit.     ALLERGIES:   Allergies   Allergen Reactions     Acetaminophen Nausea and Vomiting     Other reaction(s): Nausea/vomiting     Amlodipine Unknown     Cilostazol Unknown     Hydrocodone      Other reaction(s): Nausea/vomiting     Vicodin [Hydrocodone-Acetaminophen] Unknown     DIET: Regular, regular texture, thin liquids.  Mighty Shake Plus nutritional supplement.    Vitals:    02/20/23 1259   BP: 139/59   Pulse: 64   Resp: 18   Temp: 97.7  F (36.5  C)   SpO2: 94%   Weight: 58.8 kg (129 lb 9.6 oz)   Height: 1.676 m (5' 6\")     Wt Readings from Last 4 Encounters:   02/20/23 58.8 kg (129 lb 9.6 oz)   02/13/23 58.5 kg (129 lb)   02/09/23 58.5 kg (129 lb)   02/06/23 59 kg (130 lb)     Body mass index is 20.92 kg/m .  Body surface area is 1.65 meters squared.    EXAMINATION:   General: Pleasant, alert, and conversant elderly male, lying in bed.  He looks rather dour.  He is upset about moving into long-term care.  Head: Normocephalic and atraumatic.   Eyes: PERRLA, sclerae clear.  Bilateral ectropion.  ENT: Moist oral mucosa.  He has his own teeth.  No nasal discharge.  Hearing intact.  Cardiovascular: Regular rate and rhythm with a 3/6 systolic ejection murmur at the apex..  I could previously appreciate intermittent ectopy (every 5-6 beats).  Respiratory: Lungs CTAB.   Abdomen: Nondistended.   Musculoskeletal/Extremities: Age-related arthritic changes.  No peripheral " edema.  Integument: No rashes, clinically significant lesions, or skin breakdown.   Cognitive/Psychiatric: Alert and oriented with euthymic affect.    DIAGNOSTICS:   Recent Results (from the past 240 hour(s))   Basic metabolic panel    Collection Time: 02/15/23 11:28 AM   Result Value Ref Range    Sodium 138 136 - 145 mmol/L    Potassium 4.6 3.4 - 5.3 mmol/L    Chloride 101 98 - 107 mmol/L    Carbon Dioxide (CO2) 23 22 - 29 mmol/L    Anion Gap 14 7 - 15 mmol/L    Urea Nitrogen 63.1 (H) 8.0 - 23.0 mg/dL    Creatinine 1.30 (H) 0.67 - 1.17 mg/dL    Calcium 9.1 8.2 - 9.6 mg/dL    Glucose 232 (H) 70 - 99 mg/dL    GFR Estimate 52 (L) >60 mL/min/1.73m2       ASSESSMENT/Plan:      ICD-10-CM    1. History of non-ST elevation myocardial infarction (NSTEMI)  I25.2       2. History of cholecystitis  Z87.19       3. Type 2 diabetes mellitus without complication, without long-term current use of insulin (H)  E11.9       4. C. difficile colitis  A04.72       5. Stage 3a chronic kidney disease (H)  N18.31       6. Coronary artery disease involving coronary bypass graft of native heart without angina pectoris  I25.810       7. SSS (sick sinus syndrome) (H)  I49.5       8. Cardiac pacemaker in situ  Z95.0       9. Chronic heart failure with preserved ejection fraction (HFpEF) (H)  I50.32       10. PAD (peripheral artery disease) (H)  I73.9       11. Thrombocytopenia (H)  D69.6       12. Macrocytic anemia  D53.9           CHANGES:    None.    CARE PLAN:    The care plan, medications, vital signs, orders, and nursing notes have been reviewed, and all orders signed. Changes to care plan, if any, as noted. Otherwise, continue current plan of care.      The above has been created using voice recognition software. Please be aware that this may unintentionally  produce inaccuracies and/or nonsensical sentences.      Electronically signed by: PAULINA Manjarrez CNP        Sincerely,        PAULINA Manjarrez CNP

## 2023-02-22 NOTE — PROGRESS NOTES
[facility]    Name:   Barak Berry  :   1932  MRN:    [id number]     Facility:   [facility] (SNF) [[id number]]   Room: TCU / [place] [address]  Code Status: DNR and POLST AVAILABLE -     DOS:  2023    PCP:  Karel Petit MD     CHIEF COMPLAINT / REASON FOR VISIT:  Chief Complaint   Patient presents with     Clinic Care Coordination - Follow-up     Acute cholecystitis, NSTEMI      [facility] from 2022 until 2023 (acute cholecystitis, NSTEMI)  [facility] from 2023 until 2023 (mechanical fall)  [facility] TCU from 2023 until 2023 (moving into LTC)      HPI: Barak is a 89 year old male with a past medical history significant for diabetes mellitus type 2, essential hypertension, CKD stage III, CAD (s/p CABG , ), chronic HFpEF, sick sinus syndrome (s/p pacemaker), PAD, dyslipidemia, and GERD, who presented to the ED with nausea on 2022.  He had been in his normal state of health until that afternoon, will when he developed severe nausea and vomiting.  He subsequently developed left-sided chest pain, and heavy pressure in nature (no radiation to arm/jaw), and he was having low back pain as well.  Seen by cardiology, noting: NSTEMI with known residual CAD which would require complicated PCI.  TTE with EF 55%, no regional wall motion abnormalities, mild to moderate AV stenosis with mild AV regurgitation but no significant change compared to echo of 2022.  Given his history, cardiology opted for medical management, beginning with 48 hours of IV heparin.  He is to continue ASA, atorvastatin, carvedilol, clopidogrel, and isosorbide mononitrate.  He was also discharged on chlorthalidone.  Seen by general surgery re: Imaging concerning for cholecystitis.  Given that he is a very high surgical risk, there were no plans for surgical or IR intervention, and he had been doing well on antibiotic therapy.  Surgery recommended  "14 days of antibiotic therapy and discharged on cefdinir and metronidazole.      He suffered a mechanical fall on 01/30/2023 and was admitted to [facility] after a fall at home.  He struggled to get up from the bathroom floor.  He described a feeling like his legs were giving out, and he did sustain a contusion to the left side of his head.  He had had multiple falls in the 2 weeks prior to his admission.  He reported falling 4 times before deciding he needed therapy.  EKG showed paced rhythm, BMP mild hyperglycemia and baseline creatinine of 1.48.  CBC unremarkable except for baseline hemoglobin 10.7 and known thrombocytopenia.  Troponin was elevated though downtrending.  He underwent a CT head revealing no acute intracranial hemorrhage or evidence of infarction.  Otherwise, typical age-related chronic small vessel disease and moderate generalized volume loss.  CT cervical spine without evidence of vertebral fracture or body height loss.  No canal stenosis.  The following day, per his earlier plans, he was admitted to this facility.      CURRENT/RECENT TCU ISSUES    Disposition  -- Son recently signed LTC paperwork.  This will start on 02/21.  He is not too happy about this.  He was walking, but now that he can't go home, he is decided to stop walking.  He reports being depressed.  He tells me, \"I'd rather be home.  They're going to move me to the permanent area eventually.  He has been living in a two-story house for 62 years.  -- On 02/01, started on vancomycin for C. difficile, and Lomotil was discontinued.  He is having some loose stools now, \"though not as much.\"  -- Questions having suffered an NSTEMI.  He said, \"they didn't convince me [that I had a heart attack].\"  -- He has felt weaker since his hospitalization in December.  He appreciates working with therapies to try and strengthen his legs.  As mentioned, this isn't something he had planned.  -- Asked about any possible memory issues earlier, he " "responded, \"not major, some.\"  -- Knees tend to \"knot up\" when walking, but they eventually loosen up.  They bother him despite bilateral knee replacements.    Cognition  -- More confused than he often lets on.  Today, he looks rather dour.  -- He is incontinent of urine.  When asked if he needs to be changed when the incontinence briefs is soaked, he says he's fine.  In essence, he is unaware of his needs.    Frequent falls  -- He says he fell 4 times since his admission to the TCU, the last fall occurring on 02/10.  I did mention this to the nurse manager.  No need to write an order.  The reason?  He uses the call light when he needs to urinate, but he ultimately cannot wait.  He sustained a small skin tear to the elbow.  He does have a history of lumbar vertebral fracture.    Diabetes mellitus type 2  -- His blood sugars are \"way too high,\" according to the patient and his nurse.  They are running in the 200s and occasional 300s.  -- On 02/01, Dr. Rosenstein started him on 500 mg of metformin XR daily.  I believe this had been stopped earlier as possibly the cause of diarrhea.  As he was able to tolerate that dose, we increased it to 1000 mg daily.  He also receives glipizide, and we may wish to take him off, depending on metformin's efficacy.  -- He also been taken off of pioglitazone, and we should consider reevaluating and possibly restarting, as he had been tolerating it well prior to his hospitalization.  We will resume it today at 30 mg daily.  -- Last A1c prior to admission was 7.2%.  -- Has previously had hypoglycemia while on insulin.    Chronic HFpEF  -- On both chlorthalidone and furosemide.  Denies any orthopnea, though he does like the head of his bed elevated for comfort.    C. difficile colitis/abdominal discomfort  -- Testing positive on 01/31/2023.  -- As noted above, placed on extended course of vancomycin.  I believe he underwent treatment during his previous hospitalization.    -- Nursing " "staff reported complaints of nausea and stomachache on .  He did have a formed BM at that morning.  He does have a history of cholecystitis where symptoms were similar.  So we ordered abdominal US and ondansetron for the nausea.  Further, ordered that his metformin be given with food.   -- Abdominal x-rays were done instead of the ultrasound initially, and it showed \"nonspecific bowel gas pattern without obstruction; mild increase of feces;; no renal stones; mild gastric distention.\"  Ultrasound pending.  -- Abdominal pain/discomfort has resolved, and stools are formed.    Chronic kidney disease  -- Stage IIIa.  Care observed with nephrotoxic agents, diuretics.    Peripheral arterial disease  -- On clopidogrel and aspirin.    Family  -- His wife  2 years ago.  They were  for 62 years.  It still affects him in some ways.    Discharge planning  -- Has been living alone in a house, with his son and daughter visiting about 3 times per week.  His last care conference occurred on .  Son signed paperwork for LTC which will start on .      ROS: In addition to symptoms noted above, he has also endorsed dry eyes and has bilateral ectropion.  Sleep, he says, can be \"hit or miss.\"  Some days he sleeps well, others not.   10 point ROS of systems including Constitutional, Eyes, Respiratory, Cardiovascular, Gastroenterology, Genitourinary, Integumentary, Muscularskeletal, Psychiatric (denies any depressive symptoms) were all negative except for pertinent positives noted in my HPI.      History reviewed. No pertinent past medical history.           History reviewed. No pertinent family history.     Social History     Socioeconomic History     Marital status:      Spouse name: None     Number of children: None     Years of education: None     Highest education level: None   Tobacco Use     Smoking status: Former     Packs/day: 1.00     Years: 45.00     Pack years: 45.00     Types: Cigarettes     Quit " date: 1960     Years since quittin.1     Smokeless tobacco: Never   Substance and Sexual Activity     Alcohol use: Yes     Alcohol/week: 1.0 standard drink     Comment: Alcoholic Drinks/day: rare     Drug use: No       MEDICATIONS: Reviewed from the MAR, physician orders, and/or earlier progress notes.  MED REC REQUIRED  Post Medication Reconciliation Status: medication reconcilation previously completed during another office visit    Current Outpatient Medications   Medication Sig     acetaminophen (TYLENOL) 650 MG CR tablet Take 1,300 mg by mouth     aspirin 81 MG EC tablet [ASPIRIN 81 MG EC TABLET] Take 81 mg by mouth daily.     atorvastatin (LIPITOR) 40 MG tablet Take 40 mg by mouth     carvedilol (COREG) 25 MG tablet Take 25 mg by mouth     chlorthalidone (HYGROTON) 25 MG tablet Take 25 mg by mouth     citalopram (CELEXA) 10 MG tablet Take 30 mg by mouth     clopidogrel (PLAVIX) 75 MG tablet Take 1 tablet by mouth every morning     furosemide (LASIX) 20 MG tablet Take 20 mg by mouth     gabapentin (NEURONTIN) 300 MG capsule Take 300 mg by mouth     glipiZIDE (GLUCOTROL XL) 5 MG 24 hr tablet Take 5 mg by mouth daily     hydrALAZINE (APRESOLINE) 25 MG tablet Take 75 mg by mouth 2 times daily     hydrALAZINE (APRESOLINE) 50 MG tablet Take 50 mg by mouth     isosorbide mononitrate (IMDUR) 30 MG 24 hr tablet Take 30 mg by mouth daily Give with 120 mg tab     isosorbide mononitrate CR (IMDUR) 120 MG 24 HR ER tablet Take 120 mg by mouth     metFORMIN (GLUCOPHAGE XR) 500 MG 24 hr tablet Take 1,000 mg by mouth daily (with dinner)     Multiple Vitamins-Minerals (SYSTANE ICAPS AREDS2) CAPS Take 1 capsule by mouth daily     nitroglycerin (NITROSTAT) 0.4 MG SL tablet [NITROGLYCERIN (NITROSTAT) 0.4 MG SL TABLET] Place 0.4 mg under the tongue as needed.     ondansetron (ZOFRAN) 4 MG tablet Take 1 tablet (4 mg) by mouth every 8 hours as needed for nausea or vomiting     pantoprazole (PROTONIX) 40 MG EC tablet Take  "40 mg by mouth daily     pioglitazone (ACTOS) 45 MG tablet Take 45 mg by mouth     senna (SENOKOT) 8.6 MG tablet Take 8.6-17.2 mg by mouth     terazosin (HYTRIN) 10 MG capsule Take 1 capsule by mouth At Bedtime     vitamins  A,C,E-zinc-copper (ICAPS AREDS) 7,160-113-100 unit-mg-unit TbEC [VITAMINS  A,C,E-ZINC-COPPER (ICAPS AREDS) 7,160-113-100 UNIT-MG-UNIT TBEC] Take 1 capsule by mouth daily.     No current facility-administered medications for this visit.     ALLERGIES:   Allergies   Allergen Reactions     Acetaminophen Nausea and Vomiting     Other reaction(s): Nausea/vomiting     Amlodipine Unknown     Cilostazol Unknown     Hydrocodone      Other reaction(s): Nausea/vomiting     Vicodin [Hydrocodone-Acetaminophen] Unknown     DIET: Regular, regular texture, thin liquids.  Mighty Shake Plus nutritional supplement.    Vitals:    02/20/23 1259   BP: 139/59   Pulse: 64   Resp: 18   Temp: 97.7  F (36.5  C)   SpO2: 94%   Weight: 58.8 kg (129 lb 9.6 oz)   Height: 1.676 m (5' 6\")     Wt Readings from Last 4 Encounters:   02/20/23 58.8 kg (129 lb 9.6 oz)   02/13/23 58.5 kg (129 lb)   02/09/23 58.5 kg (129 lb)   02/06/23 59 kg (130 lb)     Body mass index is 20.92 kg/m .  Body surface area is 1.65 meters squared.    EXAMINATION:   General: Pleasant, alert, and conversant elderly male, lying in bed.  He looks rather dour.  He is upset about moving into long-term care.  Head: Normocephalic and atraumatic.   Eyes: PERRLA, sclerae clear.  Bilateral ectropion.  ENT: Moist oral mucosa.  He has his own teeth.  No nasal discharge.  Hearing intact.  Cardiovascular: Regular rate and rhythm with a 3/6 systolic ejection murmur at the apex..  I could previously appreciate intermittent ectopy (every 5-6 beats).  Respiratory: Lungs CTAB.   Abdomen: Nondistended.   Musculoskeletal/Extremities: Age-related arthritic changes.  No peripheral edema.  Integument: No rashes, clinically significant lesions, or skin breakdown. "   Cognitive/Psychiatric: Alert and oriented with euthymic affect.    DIAGNOSTICS:   Recent Results (from the past 240 hour(s))   Basic metabolic panel    Collection Time: 02/15/23 11:28 AM   Result Value Ref Range    Sodium 138 136 - 145 mmol/L    Potassium 4.6 3.4 - 5.3 mmol/L    Chloride 101 98 - 107 mmol/L    Carbon Dioxide (CO2) 23 22 - 29 mmol/L    Anion Gap 14 7 - 15 mmol/L    Urea Nitrogen 63.1 (H) 8.0 - 23.0 mg/dL    Creatinine 1.30 (H) 0.67 - 1.17 mg/dL    Calcium 9.1 8.2 - 9.6 mg/dL    Glucose 232 (H) 70 - 99 mg/dL    GFR Estimate 52 (L) >60 mL/min/1.73m2       ASSESSMENT/Plan:      ICD-10-CM    1. History of non-ST elevation myocardial infarction (NSTEMI)  I25.2       2. History of cholecystitis  Z87.19       3. Type 2 diabetes mellitus without complication, without long-term current use of insulin (H)  E11.9       4. C. difficile colitis  A04.72       5. Stage 3a chronic kidney disease (H)  N18.31       6. Coronary artery disease involving coronary bypass graft of native heart without angina pectoris  I25.810       7. SSS (sick sinus syndrome) (H)  I49.5       8. Cardiac pacemaker in situ  Z95.0       9. Chronic heart failure with preserved ejection fraction (HFpEF) (H)  I50.32       10. PAD (peripheral artery disease) (H)  I73.9       11. Thrombocytopenia (H)  D69.6       12. Macrocytic anemia  D53.9           CHANGES:    None.    CARE PLAN:    The care plan, medications, vital signs, orders, and nursing notes have been reviewed, and all orders signed. Changes to care plan, if any, as noted. Otherwise, continue current plan of care.      The above has been created using voice recognition software. Please be aware that this may unintentionally  produce inaccuracies and/or nonsensical sentences.      Electronically signed by: PAULINA Manjarrez CNP

## 2023-02-27 ENCOUNTER — TRANSITIONAL CARE UNIT VISIT (OUTPATIENT)
Dept: GERIATRICS | Facility: CLINIC | Age: 88
End: 2023-02-27
Payer: MEDICARE

## 2023-02-27 VITALS
RESPIRATION RATE: 18 BRPM | BODY MASS INDEX: 19.64 KG/M2 | HEART RATE: 68 BPM | DIASTOLIC BLOOD PRESSURE: 91 MMHG | OXYGEN SATURATION: 91 % | HEIGHT: 66 IN | SYSTOLIC BLOOD PRESSURE: 170 MMHG | TEMPERATURE: 98.3 F | WEIGHT: 122.2 LBS

## 2023-02-27 DIAGNOSIS — M62.84 SARCOPENIA: ICD-10-CM

## 2023-02-27 DIAGNOSIS — F32.1 CURRENT MODERATE EPISODE OF MAJOR DEPRESSIVE DISORDER, UNSPECIFIED WHETHER RECURRENT (H): ICD-10-CM

## 2023-02-27 DIAGNOSIS — R54 FRAILTY: ICD-10-CM

## 2023-02-27 DIAGNOSIS — R41.89 COGNITIVE IMPAIRMENT: ICD-10-CM

## 2023-02-27 DIAGNOSIS — I73.9 PAD (PERIPHERAL ARTERY DISEASE) (H): ICD-10-CM

## 2023-02-27 DIAGNOSIS — Z60.4 ISOLATION (SOCIAL): ICD-10-CM

## 2023-02-27 DIAGNOSIS — Z87.19 HISTORY OF CHOLECYSTITIS: ICD-10-CM

## 2023-02-27 DIAGNOSIS — R63.4 WEIGHT LOSS: ICD-10-CM

## 2023-02-27 DIAGNOSIS — A04.72 C. DIFFICILE COLITIS: Primary | ICD-10-CM

## 2023-02-27 DIAGNOSIS — I21.4 NSTEMI (NON-ST ELEVATED MYOCARDIAL INFARCTION) (H): ICD-10-CM

## 2023-02-27 DIAGNOSIS — I50.32 CHRONIC HEART FAILURE WITH PRESERVED EJECTION FRACTION (HFPEF) (H): ICD-10-CM

## 2023-02-27 DIAGNOSIS — E11.9 TYPE 2 DIABETES MELLITUS WITHOUT COMPLICATION, WITHOUT LONG-TERM CURRENT USE OF INSULIN (H): ICD-10-CM

## 2023-02-27 PROCEDURE — 99309 SBSQ NF CARE MODERATE MDM 30: CPT | Performed by: FAMILY MEDICINE

## 2023-02-27 SDOH — SOCIAL STABILITY - SOCIAL INSECURITY: SOCIAL EXCLUSION AND REJECTION: Z60.4

## 2023-02-27 NOTE — PROGRESS NOTES
"[facility] GERIATRICS    Chief Complaint   Patient presents with     RECHECK     HPI:  Barak Berry is a 89 year old former , olimpia  (1932), with pmhx including recent NSTEMI, prior CABG (2x), HFpEF, PAD, T2DM, falls who is being seen today for an episodic care visit at: [facility] ([facility]) [[id number]].     Per my facility admission note  \"Hospital course  He was admitted to [facility] 2023 to 2023 after a fall at home.  Per review of the ED note, he had struggle to get up after using the bathroom and fell down.  Reported a feeling of his legs giving out and had sustained a strike to the left side of his head with an abrasion.  He has had multiple falls in the past 2 weeks prior to his admission.  EKG showed a paced rhythm.  BMP showed mild hyperglycemia with baseline creatinine of 1.48.  CBC without leukocytosis, anemia with hemoglobin 10.7 (about baseline), and known thrombocytopenia.  Troponin was elevated, though downtrending from prior (see further below).  Due to the head injury, he underwent CT head without acute intracranial hemorrhage, no evidence of infarction.  Did show evidence of chronic small vessel disease and moderate generalized volume loss.  CT of the cervical spine without evidence of vertebral fracture or body height loss.  No subluxation.  No significant canal stenosis.  He was already planning to be admitted to [facility] for further therapies from the community, and was able to discharge today after his admission to the facility.     He was receiving home therapies prior to his admission to the hospital related to a previous admission.  He was hospitalized 2022 to 2023 at [facility] again.  He had presented after developing significant nausea and vomiting.  Also developed severe left-sided chest pain.  Work-up at that time notable for normal liver enzymes, low alkaline phosphatase, negative lipase.  BMP was overall at " "baseline with moderate hyperglycemia and low magnesium level.  EKG was without any acute change.  However troponins were found to be elevated.  Additionally, CT of the abdomen pelvis did show concentric gallbladder wall thickening with pericholecystic stranding suggestive of acute cholecystitis.  This was followed up with abdominal right upper quadrant ultrasound which confirmed the CT findings with a distended gallbladder and gall wall thickening, pericholecystic fluid, and layering sludge.  All consistent with acute cholecystitis.  Given this, and due to his significant cardiac history, cardiology was consulted.  Notably troponins continue to increase.  He was not having chest pain.  Echocardiogram did not show new regional wall motion abnormalities.  He did have known residual coronary disease that would require complicated PCI.  He was started on heparin with concern for NSTEMI.  His symptoms improved and given significant surgical risk given his cardiac history, planned to treat primarily with IV antibiotics.  Consider a percutaneous cholecystostomy tube if his symptoms not continue to improve.  However, he did continue to have improvement and discharged with a 14-day course of cefdinir and metronidazole.  He was discharged with home care with home therapies at that time.     Of note, he was seen in the ED 1/7/2023 due to elevated troponin.  However, this was decreased from his admission and he had no other concerning findings.\"    Since my initial encounter, he has been seen with my colleague with no ongoing medication management to improve his condition.  He continues to work with therapies as well.  However, he has plateaued in progress.  Plan is to discharge to long-term care when available.  As per recent care conference note    \"Note Text: Care conference held today 2/14/23 in patient s room. Patient, patient s son, Therapy, Nurse Manager and S.S. attended meeting. Therapy reported patient is independent " "with feeding. Set up for grooming, upper and lower body dressing. Independentwith bed mobility. Assist of 1 for toileting, transfers, and bathing. Walking 80-100ft with 2ww contact guard. Balance timed up and go test was 17.7 sec. Did 9 stairs contact guard. 13/30 SLUMS cognitive test shows cognitive impairment, needs assist withmedications and meals. DNR. Regular diet and receives might shake plus, current weight 129 lbs. Patient is taking Tylenol, Gabapentin as needed for pain and Celexa for depression. LCD set for Monday 2/20/23. Care team recommending long term care. Patient's son/POA in agreement with patient remaining at [facility] for long term care. Care plan and care conference summary were offered. Care conference summary given to POA. S.S. will continue to monitor and assist with dc planning.\"    Concern today that he continues to lose weight.  Most recent weight of 122 pounds.  Staff is also concerned for depression.  Notably pharmacy had sent a recommendation to possibly decrease citalopram from 30 mg.     In discussion with him today, he says his mood is \"going down fast\" and relates this to lack of activity.  Says he is feeling bored.  Feels isolated.  Does say at home he primarily did some cooking and would watch TV.  Not much other activity.  He does feel lonely at times not just here but also was at home.  He does have some feelings of hopelessness and being worse off than others.  He does not feel anxious or worried about things.  Does have some grief from the loss of his abilities and his spouse.  Says he sleeping okay...    He describes his appetite is so-so somewhat related to the food here.  He does not often feel hungry.  He primarily will eat sandwiches, drink milk and coffee.  Did not eat much more than this at home.  He has not had any chewing or swallowing difficulties.  He does feel like he has lost weight.  He has no nausea or vomiting.  His stools have normalized.    He has been able to walk " "approximately 100 feet with a walker with CGA.  He does feel dizziness at times for short periods and will lay down or sit himself down when this occurs.    Objective:   BP (!) 170/91   Pulse 68   Temp 98.3  F (36.8  C)   Resp 18   Ht 1.676 m (5' 6\")   Wt 55.4 kg (122 lb 3.2 oz)   SpO2 91%   BMI 19.72 kg/m    GENERAL APPEARANCE: Laying in bed comfortably, NAD  HENT:  Significant temporal atrophy, mildly [place], reasonable dentition  EYES:  Conjunctiva clear, anicteric, EOMI, PERRL  PULM  Normal WOB on RA (continues to intermittently sniff as previous), lungs CTAB, no wheezes or crackles, poor air movement  CV:  Paced rhythm, S1/S2 normal, holosystolic murmur, no LE edema  ABDOMEN: Abdomen  soft, not tender, not distended, BS normal and active throughout   M/S:   Diffusely atrophic  SKIN: Xerosis  NEURO: Alert, answering questions appropriately, normal thought processes; CN II-XII grossly intact, limited ability to sit up in bed  PSYCH:  Mood decreased with flat and restricted affect.     Recent labs in [facility] reviewed by me today.     Assessment/Plan:  (A04.72) C. difficile colitis  (primary encounter diagnosis)  (Z87.19) History of cholecystitis  Comment: Diagnosed on admission to facility likely due to antibiotics exposure related to prior nonsurgical treatment of cholecystitis.  Has since recovered with normalization of stools.  No ongoing abdominal pain, nausea or vomiting.  Potentially contributing to weight loss and appetite suppression.    (E11.9) Type 2 diabetes mellitus without complication, without long-term current use of insulin (H)  Comment: Previous A1c of 7.2%, reasonable to liberalize to a goal less than 8.5%.  Glipizide previously discontinued after resuming metformin.  Initially started 500 mg daily and since he was tolerating it increased to 1000 mg daily.  Appears intention to restart pioglitazone but has not yet occurred.    Plan:   -Continue metformin 1000 mg daily  -Resume pioglitazone 15 mg " daily    (I21.4) NSTEMI (non-ST elevated myocardial infarction) (H)  Comment: Occurred during admission at the end of December complicating treatment of cholecystitis.  Previously has undergone CABG and further treatment will require complicated PCI which was not indicated.  Given his frailty and functional status, likely primarily treat medically.    (I73.9) PAD (peripheral artery disease) (H)  Comment: Contributing to complex and increased surgical risk.  Also likely contributing to history of lower extremity pain.  Not candidate for cilostazol    (I50.32) Chronic heart failure with preserved ejection fraction (HFpEF) (H)  Comment: No evidence of decompensation, appears euvolemic.  No respiratory symptoms.  Potentially contributing to weight loss with increased metabolic needs    (R54) Frailty  Comment: Moderate to severe frailty given weight loss, falls, exhaustion, sarcopenia, cognitive deficits.  Planning to move to long-term care    (R41.89) Cognitive impairment  Comment: SLUMS here of 13/30, likely reflecting true baseline.  Limited insight into his care needs.  Planning to assist in finding long-term care as noted.    (M62.84) Sarcopenia  (R63.4) Weight loss  Comment: Significantly decreased bulk on exam.  Weight loss likely MAC T factorial related to poor intake, chronic conditions, particularly HFpEF, mood, and cognitive impairment.  May represent relatively shortened prognosis.  Plan:   -RD eval and treat  -CMP, TSH    (F32.1) Current moderate episode of major depressive disorder, unspecified whether recurrent (H)  Comment: Major depressive disorder versus complex grief syndrome.  Is on citalopram 30 mg daily and tolerating.  No indication to change at this time.  However given ongoing mood symptoms will augment with mirtazapine.  Could consider titrating from citalopram to bupropion, though this may contribute to further weight loss.  Plan:   -Start mirtazapine 7.5 mg nightly    (Z60.4) Isolation  (social)  Comment: Contributing to his depression symptoms.  Currently related to isolation and facility with limited activities.  Though sounds as though he did feel little isolated and lonely at his home as well, particularly following the loss of his spouse.  Discussed with staff today and will evaluate activity in [place], though some limitations due to his decreased function    MED REC REQUIRED  Post Medication Reconciliation Status: patient was not discharged from an inpatient facility or TCU      Orders:  [x] Start pioglitazone 15mg daily  [x] discontinue glipizide  [x] CMP, TSH     Electronically signed by:     Benjamin Rosenstein, MD, MA  [facility], [place] Faculty    This note was completed with the assistance of dictation software. Typos and word substitution-errors are expected and unintended.      35 MINUTES SPENT BY ME on the date of service doing chart review, history, exam, documentation & further activities per the note.

## 2023-02-27 NOTE — LETTER
"    2023        RE: Barak Berry  [address] [place] [address]        [facility] GERIATRICS    Chief Complaint   Patient presents with     RECHECK     HPI:  Barak Berry is a 89 year old former , olimpia  (1932), with pmhx including recent NSTEMI, prior CABG (2x), HFpEF, PAD, T2DM, falls who is being seen today for an episodic care visit at: [facility] ([facility]) [[id number]].     Per my facility admission note  \"Hospital course  He was admitted to [facility] 2023 to 2023 after a fall at home.  Per review of the ED note, he had struggle to get up after using the bathroom and fell down.  Reported a feeling of his legs giving out and had sustained a strike to the left side of his head with an abrasion.  He has had multiple falls in the past 2 weeks prior to his admission.  EKG showed a paced rhythm.  BMP showed mild hyperglycemia with baseline creatinine of 1.48.  CBC without leukocytosis, anemia with hemoglobin 10.7 (about baseline), and known thrombocytopenia.  Troponin was elevated, though downtrending from prior (see further below).  Due to the head injury, he underwent CT head without acute intracranial hemorrhage, no evidence of infarction.  Did show evidence of chronic small vessel disease and moderate generalized volume loss.  CT of the cervical spine without evidence of vertebral fracture or body height loss.  No subluxation.  No significant canal stenosis.  He was already planning to be admitted to [facility] for further therapies from the community, and was able to discharge today after his admission to the facility.     He was receiving home therapies prior to his admission to the hospital related to a previous admission.  He was hospitalized 2022 to 2023 at [facility] again.  He had presented after developing significant nausea and vomiting.  Also developed severe left-sided chest pain.  Work-up at that time notable for " "normal liver enzymes, low alkaline phosphatase, negative lipase.  BMP was overall at baseline with moderate hyperglycemia and low magnesium level.  EKG was without any acute change.  However troponins were found to be elevated.  Additionally, CT of the abdomen pelvis did show concentric gallbladder wall thickening with pericholecystic stranding suggestive of acute cholecystitis.  This was followed up with abdominal right upper quadrant ultrasound which confirmed the CT findings with a distended gallbladder and gall wall thickening, pericholecystic fluid, and layering sludge.  All consistent with acute cholecystitis.  Given this, and due to his significant cardiac history, cardiology was consulted.  Notably troponins continue to increase.  He was not having chest pain.  Echocardiogram did not show new regional wall motion abnormalities.  He did have known residual coronary disease that would require complicated PCI.  He was started on heparin with concern for NSTEMI.  His symptoms improved and given significant surgical risk given his cardiac history, planned to treat primarily with IV antibiotics.  Consider a percutaneous cholecystostomy tube if his symptoms not continue to improve.  However, he did continue to have improvement and discharged with a 14-day course of cefdinir and metronidazole.  He was discharged with home care with home therapies at that time.     Of note, he was seen in the ED 1/7/2023 due to elevated troponin.  However, this was decreased from his admission and he had no other concerning findings.\"    Since my initial encounter, he has been seen with my colleague with no ongoing medication management to improve his condition.  He continues to work with therapies as well.  However, he has plateaued in progress.  Plan is to discharge to long-term care when available.  As per recent care conference note    \"Note Text: Care conference held today 2/14/23 in patient s room. Patient, patient s son, " "Therapy, Nurse Manager and S.S. attended meeting. Therapy reported patient is independent with feeding. Set up for grooming, upper and lower body dressing. Independentwith bed mobility. Assist of 1 for toileting, transfers, and bathing. Walking 80-100ft with 2ww contact guard. Balance timed up and go test was 17.7 sec. Did 9 stairs contact guard. 13/30 SLUMS cognitive test shows cognitive impairment, needs assist withmedications and meals. DNR. Regular diet and receives might shake plus, current weight 129 lbs. Patient is taking Tylenol, Gabapentin as needed for pain and Celexa for depression. LCD set for Monday 2/20/23. Care team recommending long term care. Patient's son/POA in agreement with patient remaining at [facility] for long term care. Care plan and care conference summary were offered. Care conference summary given to POA. S.S. will continue to monitor and assist with dc planning.\"    Concern today that he continues to lose weight.  Most recent weight of 122 pounds.  Staff is also concerned for depression.  Notably pharmacy had sent a recommendation to possibly decrease citalopram from 30 mg.     In discussion with him today, he says his mood is \"going down fast\" and relates this to lack of activity.  Says he is feeling bored.  Feels isolated.  Does say at home he primarily did some cooking and would watch TV.  Not much other activity.  He does feel lonely at times not just here but also was at home.  He does have some feelings of hopelessness and being worse off than others.  He does not feel anxious or worried about things.  Does have some grief from the loss of his abilities and his spouse.  Says he sleeping okay...    He describes his appetite is so-so somewhat related to the food here.  He does not often feel hungry.  He primarily will eat sandwiches, drink milk and coffee.  Did not eat much more than this at home.  He has not had any chewing or swallowing difficulties.  He does feel like he has lost " "weight.  He has no nausea or vomiting.  His stools have normalized.    He has been able to walk approximately 100 feet with a walker with CGA.  He does feel dizziness at times for short periods and will lay down or sit himself down when this occurs.    Objective:   BP (!) 170/91   Pulse 68   Temp 98.3  F (36.8  C)   Resp 18   Ht 1.676 m (5' 6\")   Wt 55.4 kg (122 lb 3.2 oz)   SpO2 91%   BMI 19.72 kg/m    GENERAL APPEARANCE: Laying in bed comfortably, NAD  HENT:  Significant temporal atrophy, mildly [place], reasonable dentition  EYES:  Conjunctiva clear, anicteric, EOMI, PERRL  PULM  Normal WOB on RA (continues to intermittently sniff as previous), lungs CTAB, no wheezes or crackles, poor air movement  CV:  Paced rhythm, S1/S2 normal, holosystolic murmur, no LE edema  ABDOMEN: Abdomen  soft, not tender, not distended, BS normal and active throughout   M/S:   Diffusely atrophic  SKIN: Xerosis  NEURO: Alert, answering questions appropriately, normal thought processes; CN II-XII grossly intact, limited ability to sit up in bed  PSYCH:  Mood decreased with flat and restricted affect.     Recent labs in [facility] reviewed by me today.     Assessment/Plan:  (A04.72) C. difficile colitis  (primary encounter diagnosis)  (Z87.19) History of cholecystitis  Comment: Diagnosed on admission to facility likely due to antibiotics exposure related to prior nonsurgical treatment of cholecystitis.  Has since recovered with normalization of stools.  No ongoing abdominal pain, nausea or vomiting.  Potentially contributing to weight loss and appetite suppression.    (E11.9) Type 2 diabetes mellitus without complication, without long-term current use of insulin (H)  Comment: Previous A1c of 7.2%, reasonable to liberalize to a goal less than 8.5%.  Glipizide previously discontinued after resuming metformin.  Initially started 500 mg daily and since he was tolerating it increased to 1000 mg daily.  Appears intention to restart pioglitazone " but has not yet occurred.    Plan:   -Continue metformin 1000 mg daily  -Resume pioglitazone 15 mg daily    (I21.4) NSTEMI (non-ST elevated myocardial infarction) (H)  Comment: Occurred during admission at the end of December complicating treatment of cholecystitis.  Previously has undergone CABG and further treatment will require complicated PCI which was not indicated.  Given his frailty and functional status, likely primarily treat medically.    (I73.9) PAD (peripheral artery disease) (H)  Comment: Contributing to complex and increased surgical risk.  Also likely contributing to history of lower extremity pain.  Not candidate for cilostazol    (I50.32) Chronic heart failure with preserved ejection fraction (HFpEF) (H)  Comment: No evidence of decompensation, appears euvolemic.  No respiratory symptoms.  Potentially contributing to weight loss with increased metabolic needs    (R54) Frailty  Comment: Moderate to severe frailty given weight loss, falls, exhaustion, sarcopenia, cognitive deficits.  Planning to move to long-term care    (R41.89) Cognitive impairment  Comment: SLUMS here of 13/30, likely reflecting true baseline.  Limited insight into his care needs.  Planning to assist in finding long-term care as noted.    (M62.84) Sarcopenia  (R63.4) Weight loss  Comment: Significantly decreased bulk on exam.  Weight loss likely MAC T factorial related to poor intake, chronic conditions, particularly HFpEF, mood, and cognitive impairment.  May represent relatively shortened prognosis.  Plan:   -RD eval and treat  -CMP, TSH    (F32.1) Current moderate episode of major depressive disorder, unspecified whether recurrent (H)  Comment: Major depressive disorder versus complex grief syndrome.  Is on citalopram 30 mg daily and tolerating.  No indication to change at this time.  However given ongoing mood symptoms will augment with mirtazapine.  Could consider titrating from citalopram to bupropion, though this may  contribute to further weight loss.  Plan:   -Start mirtazapine 7.5 mg nightly    (Z60.4) Isolation (social)  Comment: Contributing to his depression symptoms.  Currently related to isolation and facility with limited activities.  Though sounds as though he did feel little isolated and lonely at his home as well, particularly following the loss of his spouse.  Discussed with staff today and will evaluate activity in [place], though some limitations due to his decreased function    MED REC REQUIRED  Post Medication Reconciliation Status: patient was not discharged from an inpatient facility or TCU      Orders:  [x] Start pioglitazone 15mg daily  [x] discontinue glipizide  [x] CMP, TSH     Electronically signed by:     Benjamin Rosenstein, MD, MA  [facility] Faculty    This note was completed with the assistance of dictation software. Typos and word substitution-errors are expected and unintended.      35 MINUTES SPENT BY ME on the date of service doing chart review, history, exam, documentation & further activities per the note.               Sincerely,        Benjamin Rosenstein, MD

## 2023-02-28 ENCOUNTER — LAB REQUISITION (OUTPATIENT)
Dept: LAB | Facility: CLINIC | Age: 88
End: 2023-02-28
Payer: MEDICARE

## 2023-02-28 DIAGNOSIS — R63.4 ABNORMAL WEIGHT LOSS: ICD-10-CM

## 2023-03-01 ENCOUNTER — TELEPHONE (OUTPATIENT)
Dept: GERIATRICS | Facility: CLINIC | Age: 88
End: 2023-03-01

## 2023-03-01 LAB
ALBUMIN SERPL BCG-MCNC: 3.7 G/DL (ref 3.5–5.2)
ALP SERPL-CCNC: 68 U/L (ref 40–129)
ALT SERPL W P-5'-P-CCNC: 17 U/L (ref 10–50)
ANION GAP SERPL CALCULATED.3IONS-SCNC: 13 MMOL/L (ref 7–15)
AST SERPL W P-5'-P-CCNC: 30 U/L (ref 10–50)
BILIRUB SERPL-MCNC: 0.3 MG/DL
BUN SERPL-MCNC: 59.4 MG/DL (ref 8–23)
CALCIUM SERPL-MCNC: 9.2 MG/DL (ref 8.2–9.6)
CHLORIDE SERPL-SCNC: 101 MMOL/L (ref 98–107)
CREAT SERPL-MCNC: 1.3 MG/DL (ref 0.67–1.17)
DEPRECATED HCO3 PLAS-SCNC: 24 MMOL/L (ref 22–29)
GFR SERPL CREATININE-BSD FRML MDRD: 52 ML/MIN/1.73M2
GLUCOSE SERPL-MCNC: 337 MG/DL (ref 70–99)
POTASSIUM SERPL-SCNC: 4.4 MMOL/L (ref 3.4–5.3)
PROT SERPL-MCNC: 6 G/DL (ref 6.4–8.3)
SODIUM SERPL-SCNC: 138 MMOL/L (ref 136–145)
TSH SERPL DL<=0.005 MIU/L-ACNC: 1.42 UIU/ML (ref 0.3–4.2)

## 2023-03-01 PROCEDURE — 84443 ASSAY THYROID STIM HORMONE: CPT | Mod: ORL | Performed by: FAMILY MEDICINE

## 2023-03-01 PROCEDURE — P9604 ONE-WAY ALLOW PRORATED TRIP: HCPCS | Mod: ORL | Performed by: FAMILY MEDICINE

## 2023-03-01 PROCEDURE — 80053 COMPREHEN METABOLIC PANEL: CPT | Mod: ORL | Performed by: FAMILY MEDICINE

## 2023-03-01 PROCEDURE — 36415 COLL VENOUS BLD VENIPUNCTURE: CPT | Mod: ORL | Performed by: FAMILY MEDICINE

## 2023-03-01 NOTE — TELEPHONE ENCOUNTER
[facility] Geriatrics Lab Note     Provider: Rosenstein, Benjamin MD  Facility: [facility]  Facility Type:  TCU    Allergies   Allergen Reactions     Acetaminophen Nausea and Vomiting     Other reaction(s): Nausea/vomiting     Amlodipine Unknown     Cilostazol Unknown     Hydrocodone      Other reaction(s): Nausea/vomiting     Vicodin [Hydrocodone-Acetaminophen] Unknown       Labs Reviewed by provider: CMP, TSH     Verbal Order/Direction given by Provider: No new orders.      Provider giving Order:  Rosenstein, Benjamin MD    Verbal Order given to: Sun([phone])    Du Torrez RN

## 2023-03-08 ENCOUNTER — TRANSITIONAL CARE UNIT VISIT (OUTPATIENT)
Dept: GERIATRICS | Facility: CLINIC | Age: 88
End: 2023-03-08
Payer: MEDICARE

## 2023-03-08 VITALS
SYSTOLIC BLOOD PRESSURE: 138 MMHG | HEART RATE: 76 BPM | WEIGHT: 129 LBS | OXYGEN SATURATION: 94 % | HEIGHT: 66 IN | TEMPERATURE: 98.1 F | RESPIRATION RATE: 16 BRPM | DIASTOLIC BLOOD PRESSURE: 78 MMHG | BODY MASS INDEX: 20.73 KG/M2

## 2023-03-08 DIAGNOSIS — R45.89 DEPRESSED MOOD: ICD-10-CM

## 2023-03-08 DIAGNOSIS — R63.4 WEIGHT LOSS: ICD-10-CM

## 2023-03-08 DIAGNOSIS — F03.A0 MILD DEMENTIA WITHOUT BEHAVIORAL DISTURBANCE, PSYCHOTIC DISTURBANCE, MOOD DISTURBANCE, OR ANXIETY, UNSPECIFIED DEMENTIA TYPE (H): ICD-10-CM

## 2023-03-08 DIAGNOSIS — N18.31 STAGE 3A CHRONIC KIDNEY DISEASE (H): ICD-10-CM

## 2023-03-08 DIAGNOSIS — R73.9 HYPERGLYCEMIA: Primary | ICD-10-CM

## 2023-03-08 DIAGNOSIS — E11.9 TYPE 2 DIABETES MELLITUS WITHOUT COMPLICATION, WITHOUT LONG-TERM CURRENT USE OF INSULIN (H): ICD-10-CM

## 2023-03-08 DIAGNOSIS — I50.32 CHRONIC HEART FAILURE WITH PRESERVED EJECTION FRACTION (HFPEF) (H): ICD-10-CM

## 2023-03-08 PROCEDURE — 99309 SBSQ NF CARE MODERATE MDM 30: CPT | Performed by: NURSE PRACTITIONER

## 2023-03-08 RX ORDER — MIRTAZAPINE 7.5 MG/1
7.5 TABLET, FILM COATED ORAL AT BEDTIME
COMMUNITY
End: 2024-01-01

## 2023-03-08 NOTE — PROGRESS NOTES
Code Status:  DNR  Visit Type: RECHECK     Facility:   [facility] ([facility]) [[id number]]         History of Present Illness: Barak Berry is a 89 year old male with a past medical history for NSTEMI, hx CABG,Demnetia,HFpEF, PAD, DM2, falls.  He was recently hospitalized after a fall at home  2/2 weakness.  Work up was unremarkable.  He was transferred to TCU for rehab with the plan to be placed in LTC.      Today, he reports feeling generally well.  He is waiting for LTC placement and is no longer receiving therapy.  BS are running 300-400s even after starting pioglitazone 2 weeks ago. He has been having weight loss and so mirtazapine was started.  Nursing reports that he is eating better and has gained 7lbs in the past 2 weeks.  He reports feeling as if he is eating about the same.       Current Outpatient Medications   Medication Sig Dispense Refill     acetaminophen (TYLENOL) 650 MG CR tablet Take 1,300 mg by mouth       aspirin 81 MG EC tablet [ASPIRIN 81 MG EC TABLET] Take 81 mg by mouth daily.       atorvastatin (LIPITOR) 40 MG tablet Take 40 mg by mouth       carvedilol (COREG) 25 MG tablet Take 25 mg by mouth       citalopram (CELEXA) 10 MG tablet Take 20 mg by mouth       clopidogrel (PLAVIX) 75 MG tablet Take 1 tablet by mouth every morning       furosemide (LASIX) 20 MG tablet Take 20 mg by mouth       gabapentin (NEURONTIN) 300 MG capsule Take 300 mg by mouth       hydrALAZINE (APRESOLINE) 25 MG tablet Take 75 mg by mouth 2 times daily       isosorbide mononitrate (IMDUR) 30 MG 24 hr tablet Take 30 mg by mouth daily Give with 120 mg tab       isosorbide mononitrate CR (IMDUR) 120 MG 24 HR ER tablet Take 120 mg by mouth       metFORMIN (GLUCOPHAGE XR) 500 MG 24 hr tablet Take 1,000 mg by mouth daily (with dinner)       mirtazapine (REMERON) 7.5 MG tablet Take 7.5 mg by mouth At Bedtime       Multiple Vitamins-Minerals (SYSTANE ICAPS AREDS2) CAPS Take 1 capsule by mouth daily       nitroglycerin  "(NITROSTAT) 0.4 MG SL tablet [NITROGLYCERIN (NITROSTAT) 0.4 MG SL TABLET] Place 0.4 mg under the tongue as needed.       ondansetron (ZOFRAN) 4 MG tablet Take 1 tablet (4 mg) by mouth every 8 hours as needed for nausea or vomiting       pantoprazole (PROTONIX) 40 MG EC tablet Take 40 mg by mouth daily       pioglitazone (ACTOS) 45 MG tablet Take 30 mg by mouth       terazosin (HYTRIN) 10 MG capsule Take 1 capsule by mouth At Bedtime       vitamins  A,C,E-zinc-copper (ICAPS AREDS) 7,160-113-100 unit-mg-unit TbEC [VITAMINS  A,C,E-ZINC-COPPER (ICAPS AREDS) 7,160-113-100 UNIT-MG-UNIT TBEC] Take 1 capsule by mouth daily.         Review of Systems   Patient denies fever, chills, headache, lightheadedness, dizziness, rhinorrhea, cough, congestion, shortness of breath, chest pain, palpitations, abdominal pain, n/v, diarrhea, constipation, change in appetite, change in sleep pattern, dysuria, frequency, burning or pain with urination.  Other than stated in HPI all other review of systems is negative.         Physical Exam  Vital signs:/78   Pulse 76   Temp 98.1  F (36.7  C)   Resp 16   Ht 1.676 m (5' 6\")   Wt 58.5 kg (129 lb)   SpO2 94%   BMI 20.82 kg/m     GENERAL APPEARANCE: elderly male, in no acute distress.  HEENT: normocephalic, atraumatic   sclerae anicteric, conjunctivae clear and moist, EOM intact  LUNGS: Lung sounds CTA, no adventitious sounds, respiratory effort normal.  CARD: RRR, S1, S2, 3/6 murmur, no rubs or gallops   ABD: Soft, nondistended and nontender with normal bowel sounds.   MSK: Muscle strength and tone were equal bilaterally. Moves all extremities easily and intentionally.   EXTREMITIES: No cyanosis, clubbing or edema.  NEURO: Alert with cognitive impairment,  Face is symmetric.  SKIN: Inspection of the skin reveals no rashes, ulcerations or petechiae.  PSYCH: euthymic          Labs:    Last Comprehensive Metabolic Panel:  Lab Results   Component Value Date     03/01/2023    " POTASSIUM 4.4 03/01/2023    CHLORIDE 101 03/01/2023    CO2 24 03/01/2023    ANIONGAP 13 03/01/2023     (H) 03/01/2023    BUN 59.4 (H) 03/01/2023    CR 1.30 (H) 03/01/2023    GFRESTIMATED 52 (L) 03/01/2023    VINAYAK 9.2 03/01/2023       Lab Results   Component Value Date    WBC 6.7 02/06/2023     Lab Results   Component Value Date    RBC 3.05 02/06/2023     Lab Results   Component Value Date    HGB 10.2 02/06/2023     Lab Results   Component Value Date    HCT 31.7 02/06/2023     Lab Results   Component Value Date     02/06/2023     Lab Results   Component Value Date    MCH 33.4 02/06/2023     Lab Results   Component Value Date    MCHC 32.2 02/06/2023     Lab Results   Component Value Date    RDW 13.2 02/06/2023     Lab Results   Component Value Date     02/06/2023           Assessment/Plan:  Hyperglycemia  Type 2 diabetes mellitus without complication, without long-term current use of insulin (H)  Nursing feels he snacks on high carb snacks throughout the day.  Increase pioglitazone and continue with Metformin.  Check A1c next week. Last A1c 7.2% which is goal for age.  Decrease BS to daily and if showing improvement or A1c still within goal would recommend dcing BS checks all together.     Weight loss  Improving, continue with Mirtazapine and discontinue magic cup due to higher Bs.     Chronic heart failure with preserved ejection fraction (HFpEF) (H)  Compensated, continue with Coreg, Imdur and Lasix.     Depressed mood  Stable, decrease Celexa 20mg daily and continue with current mirtazapine.     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  LTC placement, continue with mirtazapine and celexa for mood. No other medications.     Stage 3a chronic kidney disease (H)  Last GFR 52 which is baseline.  Monitor and avoid nephrotoxins.           Electronically signed by:Dora Saldana  NP

## 2023-03-08 NOTE — LETTER
3/8/2023        RE: Barak Berry  [address] [place] [address]        Code Status:  DNR  Visit Type: RECHECK     Facility:   [facility] ([facility]) [[id number]]         History of Present Illness: Barak Berry is a 89 year old male with a past medical history for NSTEMI, hx CABG,Demnetia,HFpEF, PAD, DM2, falls.  He was recently hospitalized after a fall at home  2/2 weakness.  Work up was unremarkable.  He was transferred to TCU for rehab with the plan to be placed in LTC.      Today, he reports feeling generally well.  He is waiting for LTC placement and is no longer receiving therapy.  BS are running 300-400s even after starting pioglitazone 2 weeks ago. He has been having weight loss and so mirtazapine was started.  Nursing reports that he is eating better and has gained 7lbs in the past 2 weeks.  He reports feeling as if he is eating about the same.       Current Outpatient Medications   Medication Sig Dispense Refill     acetaminophen (TYLENOL) 650 MG CR tablet Take 1,300 mg by mouth       aspirin 81 MG EC tablet [ASPIRIN 81 MG EC TABLET] Take 81 mg by mouth daily.       atorvastatin (LIPITOR) 40 MG tablet Take 40 mg by mouth       carvedilol (COREG) 25 MG tablet Take 25 mg by mouth       citalopram (CELEXA) 10 MG tablet Take 20 mg by mouth       clopidogrel (PLAVIX) 75 MG tablet Take 1 tablet by mouth every morning       furosemide (LASIX) 20 MG tablet Take 20 mg by mouth       gabapentin (NEURONTIN) 300 MG capsule Take 300 mg by mouth       hydrALAZINE (APRESOLINE) 25 MG tablet Take 75 mg by mouth 2 times daily       isosorbide mononitrate (IMDUR) 30 MG 24 hr tablet Take 30 mg by mouth daily Give with 120 mg tab       isosorbide mononitrate CR (IMDUR) 120 MG 24 HR ER tablet Take 120 mg by mouth       metFORMIN (GLUCOPHAGE XR) 500 MG 24 hr tablet Take 1,000 mg by mouth daily (with dinner)       mirtazapine (REMERON) 7.5 MG tablet Take 7.5 mg by mouth At Bedtime       Multiple  "Vitamins-Minerals (SYSTANE ICAPS AREDS2) CAPS Take 1 capsule by mouth daily       nitroglycerin (NITROSTAT) 0.4 MG SL tablet [NITROGLYCERIN (NITROSTAT) 0.4 MG SL TABLET] Place 0.4 mg under the tongue as needed.       ondansetron (ZOFRAN) 4 MG tablet Take 1 tablet (4 mg) by mouth every 8 hours as needed for nausea or vomiting       pantoprazole (PROTONIX) 40 MG EC tablet Take 40 mg by mouth daily       pioglitazone (ACTOS) 45 MG tablet Take 30 mg by mouth       terazosin (HYTRIN) 10 MG capsule Take 1 capsule by mouth At Bedtime       vitamins  A,C,E-zinc-copper (ICAPS AREDS) 7,160-113-100 unit-mg-unit TbEC [VITAMINS  A,C,E-ZINC-COPPER (ICAPS AREDS) 7,160-113-100 UNIT-MG-UNIT TBEC] Take 1 capsule by mouth daily.         Review of Systems   Patient denies fever, chills, headache, lightheadedness, dizziness, rhinorrhea, cough, congestion, shortness of breath, chest pain, palpitations, abdominal pain, n/v, diarrhea, constipation, change in appetite, change in sleep pattern, dysuria, frequency, burning or pain with urination.  Other than stated in HPI all other review of systems is negative.         Physical Exam  Vital signs:/78   Pulse 76   Temp 98.1  F (36.7  C)   Resp 16   Ht 1.676 m (5' 6\")   Wt 58.5 kg (129 lb)   SpO2 94%   BMI 20.82 kg/m     GENERAL APPEARANCE: elderly male, in no acute distress.  HEENT: normocephalic, atraumatic   sclerae anicteric, conjunctivae clear and moist, EOM intact  LUNGS: Lung sounds CTA, no adventitious sounds, respiratory effort normal.  CARD: RRR, S1, S2, 3/6 murmur, no rubs or gallops   ABD: Soft, nondistended and nontender with normal bowel sounds.   MSK: Muscle strength and tone were equal bilaterally. Moves all extremities easily and intentionally.   EXTREMITIES: No cyanosis, clubbing or edema.  NEURO: Alert with cognitive impairment,  Face is symmetric.  SKIN: Inspection of the skin reveals no rashes, ulcerations or petechiae.  PSYCH: euthymic          Labs:    Last " Comprehensive Metabolic Panel:  Lab Results   Component Value Date     03/01/2023    POTASSIUM 4.4 03/01/2023    CHLORIDE 101 03/01/2023    CO2 24 03/01/2023    ANIONGAP 13 03/01/2023     (H) 03/01/2023    BUN 59.4 (H) 03/01/2023    CR 1.30 (H) 03/01/2023    GFRESTIMATED 52 (L) 03/01/2023    VINAYAK 9.2 03/01/2023       Lab Results   Component Value Date    WBC 6.7 02/06/2023     Lab Results   Component Value Date    RBC 3.05 02/06/2023     Lab Results   Component Value Date    HGB 10.2 02/06/2023     Lab Results   Component Value Date    HCT 31.7 02/06/2023     Lab Results   Component Value Date     02/06/2023     Lab Results   Component Value Date    MCH 33.4 02/06/2023     Lab Results   Component Value Date    MCHC 32.2 02/06/2023     Lab Results   Component Value Date    RDW 13.2 02/06/2023     Lab Results   Component Value Date     02/06/2023           Assessment/Plan:  Hyperglycemia  Type 2 diabetes mellitus without complication, without long-term current use of insulin (H)  Nursing feels he snacks on high carb snacks throughout the day.  Increase pioglitazone and continue with Metformin.  Check A1c next week. Last A1c 7.2% which is goal for age.  Decrease BS to daily and if showing improvement or A1c still within goal would recommend dcing BS checks all together.     Weight loss  Improving, continue with Mirtazapine and discontinue magic cup due to higher Bs.     Chronic heart failure with preserved ejection fraction (HFpEF) (H)  Compensated, continue with Coreg, Imdur and Lasix.     Depressed mood  Stable, decrease Celexa 20mg daily and continue with current mirtazapine.     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  LTC placement, continue with mirtazapine and celexa for mood. No other medications.     Stage 3a chronic kidney disease (H)  Last GFR 52 which is baseline.   Monitor and avoid nephrotoxins.           Electronically signed by:Dora Saldana NP                  Sincerely,        Dora Saldana, NP

## 2023-03-13 ENCOUNTER — LAB REQUISITION (OUTPATIENT)
Dept: LAB | Facility: CLINIC | Age: 88
End: 2023-03-13
Payer: MEDICARE

## 2023-03-13 DIAGNOSIS — E11.9 TYPE 2 DIABETES MELLITUS WITHOUT COMPLICATIONS (H): ICD-10-CM

## 2023-03-14 VITALS
SYSTOLIC BLOOD PRESSURE: 145 MMHG | BODY MASS INDEX: 20.73 KG/M2 | TEMPERATURE: 98.5 F | HEART RATE: 60 BPM | DIASTOLIC BLOOD PRESSURE: 74 MMHG | OXYGEN SATURATION: 95 % | WEIGHT: 129 LBS | HEIGHT: 66 IN | RESPIRATION RATE: 16 BRPM

## 2023-03-15 ENCOUNTER — TRANSITIONAL CARE UNIT VISIT (OUTPATIENT)
Dept: GERIATRICS | Facility: CLINIC | Age: 88
End: 2023-03-15
Payer: MEDICARE

## 2023-03-15 DIAGNOSIS — R63.4 WEIGHT LOSS: ICD-10-CM

## 2023-03-15 DIAGNOSIS — R45.89 DEPRESSED MOOD: ICD-10-CM

## 2023-03-15 DIAGNOSIS — R73.9 HYPERGLYCEMIA: Primary | ICD-10-CM

## 2023-03-15 DIAGNOSIS — E11.9 TYPE 2 DIABETES MELLITUS WITHOUT COMPLICATION, WITHOUT LONG-TERM CURRENT USE OF INSULIN (H): ICD-10-CM

## 2023-03-15 LAB — HBA1C MFR BLD: 9.5 %

## 2023-03-15 PROCEDURE — 83036 HEMOGLOBIN GLYCOSYLATED A1C: CPT | Mod: ORL | Performed by: FAMILY MEDICINE

## 2023-03-15 PROCEDURE — 36415 COLL VENOUS BLD VENIPUNCTURE: CPT | Mod: ORL | Performed by: FAMILY MEDICINE

## 2023-03-15 PROCEDURE — 99309 SBSQ NF CARE MODERATE MDM 30: CPT | Performed by: NURSE PRACTITIONER

## 2023-03-15 PROCEDURE — P9604 ONE-WAY ALLOW PRORATED TRIP: HCPCS | Mod: ORL | Performed by: FAMILY MEDICINE

## 2023-03-15 NOTE — LETTER
3/15/2023        RE: Barak Berry  [address] [place] [address]        Code Status:  DNR  Visit Type: RECHECK     Facility:   [facility] ([facility]) [[id number]]         History of Present Illness: Barak Berry is a 89 year old male with a past medical history for NSTEMI, hx CABG,Demnetia,HFpEF, PAD, DM2, falls.  He was recently hospitalized after a fall at home  2/2 weakness.  Work up was unremarkable.  He was transferred to TCU for rehab with the plan to be placed in LTC.      Today, I follow up with patient after increasing his Actos last week.  BS slightly better with some in the 200s however most are 300-400s.  He states he has been on Lantus and short acting insulin in the past and is planning to move to the LTC so this could be managed by nursing.  He reports his mood is stable without any adverse effects after decreasing his Celexa last week.  Weight continues to improve and is now 132lb on mirtazapine.     Current Outpatient Medications   Medication Sig Dispense Refill     acetaminophen (TYLENOL) 650 MG CR tablet Take 1,300 mg by mouth       aspirin 81 MG EC tablet [ASPIRIN 81 MG EC TABLET] Take 81 mg by mouth daily.       atorvastatin (LIPITOR) 40 MG tablet Take 40 mg by mouth       carvedilol (COREG) 25 MG tablet Take 25 mg by mouth       citalopram (CELEXA) 10 MG tablet Take 20 mg by mouth       clopidogrel (PLAVIX) 75 MG tablet Take 1 tablet by mouth every morning       furosemide (LASIX) 20 MG tablet Take 20 mg by mouth       gabapentin (NEURONTIN) 300 MG capsule Take 300 mg by mouth       hydrALAZINE (APRESOLINE) 25 MG tablet Take 75 mg by mouth 2 times daily       isosorbide mononitrate (IMDUR) 30 MG 24 hr tablet Take 30 mg by mouth daily Give with 120 mg tab       isosorbide mononitrate CR (IMDUR) 120 MG 24 HR ER tablet Take 120 mg by mouth       metFORMIN (GLUCOPHAGE XR) 500 MG 24 hr tablet Take 1,000 mg by mouth daily (with dinner)       mirtazapine (REMERON) 7.5 MG  "tablet Take 7.5 mg by mouth At Bedtime       Multiple Vitamins-Minerals (SYSTANE ICAPS AREDS2) CAPS Take 1 capsule by mouth daily       nitroglycerin (NITROSTAT) 0.4 MG SL tablet [NITROGLYCERIN (NITROSTAT) 0.4 MG SL TABLET] Place 0.4 mg under the tongue as needed.       ondansetron (ZOFRAN) 4 MG tablet Take 1 tablet (4 mg) by mouth every 8 hours as needed for nausea or vomiting       pantoprazole (PROTONIX) 40 MG EC tablet Take 40 mg by mouth daily       pioglitazone (ACTOS) 45 MG tablet Take 30 mg by mouth       terazosin (HYTRIN) 10 MG capsule Take 1 capsule by mouth At Bedtime       vitamins  A,C,E-zinc-copper (ICAPS AREDS) 7,160-113-100 unit-mg-unit TbEC [VITAMINS  A,C,E-ZINC-COPPER (ICAPS AREDS) 7,160-113-100 UNIT-MG-UNIT TBEC] Take 1 capsule by mouth daily.         Review of Systems   Patient denies fever, chills, headache, lightheadedness, dizziness, rhinorrhea, cough, congestion, shortness of breath, chest pain, palpitations, abdominal pain, n/v, diarrhea, constipation, change in appetite, change in sleep pattern, dysuria, frequency, burning or pain with urination.  Other than stated in HPI all other review of systems is negative.         Physical Exam  Vital signs:BP (!) 145/74   Pulse 60   Temp 98.5  F (36.9  C)   Resp 16   Ht 1.676 m (5' 6\")   Wt 58.5 kg (129 lb)   SpO2 95%   BMI 20.82 kg/m     GENERAL APPEARANCE: elderly male, in no acute distress.  HEENT: normocephalic, atraumatic   sclerae anicteric, conjunctivae clear and moist, EOM intact  LUNGS: Lung sounds CTA, no adventitious sounds, respiratory effort normal.  CARD: RRR, S1, S2, 3/6 murmur, no rubs or gallops   ABD: Soft, nondistended and nontender with normal bowel sounds.   MSK: Muscle strength and tone were equal bilaterally. Moves all extremities easily and intentionally.   EXTREMITIES: No cyanosis, clubbing or edema.  NEURO: Alert with cognitive impairment,  Face is symmetric.  SKIN: Inspection of the skin reveals no rashes, " ulcerations or petechiae.  PSYCH: euthymic          Labs:    Last Comprehensive Metabolic Panel:  Lab Results   Component Value Date     03/01/2023    POTASSIUM 4.4 03/01/2023    CHLORIDE 101 03/01/2023    CO2 24 03/01/2023    ANIONGAP 13 03/01/2023     (H) 03/01/2023    BUN 59.4 (H) 03/01/2023    CR 1.30 (H) 03/01/2023    GFRESTIMATED 52 (L) 03/01/2023    VINAYAK 9.2 03/01/2023       Lab Results   Component Value Date    WBC 6.7 02/06/2023     Lab Results   Component Value Date    RBC 3.05 02/06/2023     Lab Results   Component Value Date    HGB 10.2 02/06/2023     Lab Results   Component Value Date    HCT 31.7 02/06/2023     Lab Results   Component Value Date     02/06/2023     Lab Results   Component Value Date    MCH 33.4 02/06/2023     Lab Results   Component Value Date    MCHC 32.2 02/06/2023     Lab Results   Component Value Date    RDW 13.2 02/06/2023     Lab Results   Component Value Date     02/06/2023           Assessment/Plan:  Hyperglycemia  Type 2 diabetes mellitus without complication, without long-term current use of insulin (H)  A1c today is 9.2 above range for age.  Will start on Lantus 10u at HS and continue with pioglitazone and Metformin.     Weight loss  Weight improving with mirtazapine.     Depressed mood  Stable, continue on lower dose of celexa and mirtazapine.  Recommend assessing the need to wean off Celexa in the next month.     Chronic heart failure with preserved ejection fraction (HFpEF) (H)  Compensated, continue with Coreg, Imdur and Lasix.     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  LTC placement, continue with mirtazapine and celexa for mood. No other medications.     Stage 3a chronic kidney disease (H)  Last GFR 52 which is baseline.  Monitor and avoid nephrotoxins.           Electronically signed by:Dora Saldana  NP                  Sincerely,        Dora Saldana, NP

## 2023-03-15 NOTE — PROGRESS NOTES
Code Status:  DNR  Visit Type: RECHECK     Facility:   [facility] ([facility]) [[id number]]         History of Present Illness: Barak Berry is a 89 year old male with a past medical history for NSTEMI, hx CABG,Demnetia,HFpEF, PAD, DM2, falls.  He was recently hospitalized after a fall at home  2/2 weakness.  Work up was unremarkable.  He was transferred to TCU for rehab with the plan to be placed in LTC.      Today, I follow up with patient after increasing his Actos last week.  BS slightly better with some in the 200s however most are 300-400s.  He states he has been on Lantus and short acting insulin in the past and is planning to move to the LTC so this could be managed by nursing.  He reports his mood is stable without any adverse effects after decreasing his Celexa last week.  Weight continues to improve and is now 132lb on mirtazapine.     Current Outpatient Medications   Medication Sig Dispense Refill     acetaminophen (TYLENOL) 650 MG CR tablet Take 1,300 mg by mouth       aspirin 81 MG EC tablet [ASPIRIN 81 MG EC TABLET] Take 81 mg by mouth daily.       atorvastatin (LIPITOR) 40 MG tablet Take 40 mg by mouth       carvedilol (COREG) 25 MG tablet Take 25 mg by mouth       citalopram (CELEXA) 10 MG tablet Take 20 mg by mouth       clopidogrel (PLAVIX) 75 MG tablet Take 1 tablet by mouth every morning       furosemide (LASIX) 20 MG tablet Take 20 mg by mouth       gabapentin (NEURONTIN) 300 MG capsule Take 300 mg by mouth       hydrALAZINE (APRESOLINE) 25 MG tablet Take 75 mg by mouth 2 times daily       isosorbide mononitrate (IMDUR) 30 MG 24 hr tablet Take 30 mg by mouth daily Give with 120 mg tab       isosorbide mononitrate CR (IMDUR) 120 MG 24 HR ER tablet Take 120 mg by mouth       metFORMIN (GLUCOPHAGE XR) 500 MG 24 hr tablet Take 1,000 mg by mouth daily (with dinner)       mirtazapine (REMERON) 7.5 MG tablet Take 7.5 mg by mouth At Bedtime       Multiple Vitamins-Minerals (SYSTANE ICAPS AREDS2)  "CAPS Take 1 capsule by mouth daily       nitroglycerin (NITROSTAT) 0.4 MG SL tablet [NITROGLYCERIN (NITROSTAT) 0.4 MG SL TABLET] Place 0.4 mg under the tongue as needed.       ondansetron (ZOFRAN) 4 MG tablet Take 1 tablet (4 mg) by mouth every 8 hours as needed for nausea or vomiting       pantoprazole (PROTONIX) 40 MG EC tablet Take 40 mg by mouth daily       pioglitazone (ACTOS) 45 MG tablet Take 30 mg by mouth       terazosin (HYTRIN) 10 MG capsule Take 1 capsule by mouth At Bedtime       vitamins  A,C,E-zinc-copper (ICAPS AREDS) 7,160-113-100 unit-mg-unit TbEC [VITAMINS  A,C,E-ZINC-COPPER (ICAPS AREDS) 7,160-113-100 UNIT-MG-UNIT TBEC] Take 1 capsule by mouth daily.         Review of Systems   Patient denies fever, chills, headache, lightheadedness, dizziness, rhinorrhea, cough, congestion, shortness of breath, chest pain, palpitations, abdominal pain, n/v, diarrhea, constipation, change in appetite, change in sleep pattern, dysuria, frequency, burning or pain with urination.  Other than stated in HPI all other review of systems is negative.         Physical Exam  Vital signs:BP (!) 145/74   Pulse 60   Temp 98.5  F (36.9  C)   Resp 16   Ht 1.676 m (5' 6\")   Wt 58.5 kg (129 lb)   SpO2 95%   BMI 20.82 kg/m     GENERAL APPEARANCE: elderly male, in no acute distress.  HEENT: normocephalic, atraumatic   sclerae anicteric, conjunctivae clear and moist, EOM intact  LUNGS: Lung sounds CTA, no adventitious sounds, respiratory effort normal.  CARD: RRR, S1, S2, 3/6 murmur, no rubs or gallops   ABD: Soft, nondistended and nontender with normal bowel sounds.   MSK: Muscle strength and tone were equal bilaterally. Moves all extremities easily and intentionally.   EXTREMITIES: No cyanosis, clubbing or edema.  NEURO: Alert with cognitive impairment,  Face is symmetric.  SKIN: Inspection of the skin reveals no rashes, ulcerations or petechiae.  PSYCH: euthymic          Labs:    Last Comprehensive Metabolic Panel:  Lab " Results   Component Value Date     03/01/2023    POTASSIUM 4.4 03/01/2023    CHLORIDE 101 03/01/2023    CO2 24 03/01/2023    ANIONGAP 13 03/01/2023     (H) 03/01/2023    BUN 59.4 (H) 03/01/2023    CR 1.30 (H) 03/01/2023    GFRESTIMATED 52 (L) 03/01/2023    VINAYAK 9.2 03/01/2023       Lab Results   Component Value Date    WBC 6.7 02/06/2023     Lab Results   Component Value Date    RBC 3.05 02/06/2023     Lab Results   Component Value Date    HGB 10.2 02/06/2023     Lab Results   Component Value Date    HCT 31.7 02/06/2023     Lab Results   Component Value Date     02/06/2023     Lab Results   Component Value Date    MCH 33.4 02/06/2023     Lab Results   Component Value Date    MCHC 32.2 02/06/2023     Lab Results   Component Value Date    RDW 13.2 02/06/2023     Lab Results   Component Value Date     02/06/2023           Assessment/Plan:  Hyperglycemia  Type 2 diabetes mellitus without complication, without long-term current use of insulin (H)  A1c today is 9.2 above range for age.  Will start on Lantus 10u at HS and continue with pioglitazone and Metformin.     Weight loss  Weight improving with mirtazapine.     Depressed mood  Stable, continue on lower dose of celexa and mirtazapine.  Recommend assessing the need to wean off Celexa in the next month.     Chronic heart failure with preserved ejection fraction (HFpEF) (H)  Compensated, continue with Coreg, Imdur and Lasix.     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  LTC placement, continue with mirtazapine and celexa for mood. No other medications.     Stage 3a chronic kidney disease (H)  Last GFR 52 which is baseline.  Monitor and avoid nephrotoxins.           Electronically signed by:Dora Saldana NP

## 2023-04-24 NOTE — PROGRESS NOTES
"[facility] GERIATRICS    Chief Complaint   Patient presents with     RECHECK     HPI:  Barak Berry is a 89 year old  (1932), former , olimpia  (1932), with pmhx including recent NSTEMI, prior CABG (2x), HFpEF, PAD, T2DM, falls who is being seen today for an episodic care visit at: [facility] ([facility]) [[id number]].    He is familiar to me from his time in the TCU at this facility.  See my note 2023 for review of hospital course.  Briefly, he was admitted essentially overnight as he had fallen in his bathroom is unable to get up.  Had had multiple falls in the prior weeks.  Additionally had been receiving home therapies with a plan to be admitted to [facility] for ongoing therapies even prior to his hospital admission.  Of note, his stay in the TCU was complicated by C. difficile colitis suspected be due to antibiotics exposure given nonsurgical treatment of cholecystitis.  This overall did resolve.    He was last seen by colleague Dora Saldana, COLEEN 3/8 and 3/15/2023.  At that time there was concern for elevated blood sugars running 300s to 400s.  He had been resumed on pioglitazone at that time for 2 weeks.  Of note he was eating significantly better and has gained 7 pounds in the 2 weeks prior as well.  Pioglitazone was increased to 30 mg daily.  A1c did return elevated 9.2% and he was continued to have episodes of hypoglycemia.  Was started on 10 units of Lantus at night with same dose of pioglitazone and metformin as prior.    Additionally there have been concerns with depression though overall stable on citalopram and mirtazapine.  Though concerns were raised recently, see below     \"3/24/2023 13:57 Spiritual Life/   Note Text: 1:1 visit with Barak, who said on the depression scale, he is 45 on a scale of 1 to 10. Really misses his neighborhood, house and independence. Is 100% Wallisian, and a member of [employer] on [address] since " "age 7. His wife  in 2020. Has a son and daughter. Participates in weekly Amish services on his computer. The  and care team visit Barak here and occastionally bring Communion. He loves to garden and bake pies and cookies. Closed our visit with a prayer. Will continue to follow.\"    Today, says he is doing pretty well.  He has resumed therapies and is doing well \"at the gym.\"  Enjoys going to therapy.  He is not experiencing shortness of breath though occasionally has chest pains with therapies.  His appetites been good and remains significantly improved from previous.    He does say his mood is not bad currently.  He has become more accepting that he will be staying at [facility] and awaiting a place at the [facility].  His mood has improved with this.  Additionally he continues to speak with the 's and finds this helpful.  He saw them earlier even today and let them know and he wishes to continue to attend Amish services.  Does say he does not feel anxious or worried about things.  He is sleeping well.  [place] more often there.    He is currently not too involved in many activities.  Says he mainly eats, goes to therapy, watch that shows.  Does like to keep up with others from his prior [employer]ish as well as Amish services here.  Notably, his family and [employer] community are very important to him.  Make his life enjoyable.  His son is visiting a couple times a week and his daughter typically comes to see him on weekends.    NH ROS    Nursing concerns: None today.      Appetite: Good.      Weight changes: Increased 130--> 137lb.     Bowel: Occasionally incontinent.      Bladder: Freq incontinent.      Skin: No concerns.      Sleep: see HPI.      Mood: see HPI.  PHQ 9 = 5    Behavior: No concerns.      Mobility: Ambulates with a walker.      ADL assistance: 1A with most activities.      Pain: No significant pain " ".    Objective:   BP (!) 147/63   Pulse 74   Temp 97.4  F (36.3  C)   Resp 18   Ht 1.676 m (5' 6\")   Wt 59.3 kg (130 lb 12.8 oz)   SpO2 94%   BMI 21.11 kg/m      GENERAL APPEARANCE: Laying in bed comfortably, NAD  HENT:  Moderate temporal atrophy, mildly [place]  PULM  Normal WOB on RA, lungs CTAB, no wheezes or crackles  CV:  RRR (paced), holosystolic murmur, no LE edema  ABDOMEN: Abdomen  soft, not tender, not distended, BS normal and active throughout   NEURO: Alert, answering questions appropriately, normal thought processes; CN II-XII grossly intact, Ambulates independently with walker    Recent labs in EPIC reviewed by me today.     Assessment/Plan:      ICD-10-CM    1. PAD (peripheral artery disease) (H)  I73.9       2. NSTEMI (non-ST elevated myocardial infarction) (H)  I21.4       3. Chronic heart failure with preserved ejection fraction (HFpEF) (H)  I50.32       4. S/P CABG (coronary artery bypass graft)  Z95.1       5. History of Clostridioides difficile colitis  Z86.19       6. History of acute cholecystitis  Z87.19       7. Physical deconditioning  R53.81       8. Frailty  R54         Overall doing well since his move from [facility] to [facility].  Planning to continue with long-term care at the [facility] when bed available.  Has had significant improvement in oral intake and associated weight.  Mood has improved as well as he acclimates to his new situation.  He does have significant cardiovascular disease which is overall stable.  Likely some ongoing anginal symptoms with therapy which we will continue to manage medically.  Lantus was recently added to his regimen for elevated blood sugars.  He has had no episodes of hypoglycemia and is tolerating insulin well.  Blood sugars have been 100s to 200s, appropriate for his age and function.     No changes today.    MED REC REQUIRED  Post Medication Reconciliation Status: medication reconcilation previously completed during another office " visit      Orders:  - NNO    Benjamin Rosenstein, MD, MA  [facility] [place], [place] Faculty    This note was completed with the assistance of dictation software. Typos and word substitution-errors are expected and unintended.    43 MINUTES SPENT BY ME on the date of service doing chart review, history, exam, documentation & further activities per the note.

## 2023-04-24 NOTE — LETTER
"    2023        RE: Barak Berry  [address] [place] [address]        [facility] GERIATRICS    Chief Complaint   Patient presents with     RECHECK     HPI:  Barak Berry is a 89 year old  (1932), former , olimpia  (1932), with pmhx including recent NSTEMI, prior CABG (2x), HFpEF, PAD, T2DM, falls who is being seen today for an episodic care visit at: [facility] ([facility]) [[id number]].    He is familiar to me from his time in the TCU at this facility.  See my note 2023 for review of hospital course.  Briefly, he was admitted essentially overnight as he had fallen in his bathroom is unable to get up.  Had had multiple falls in the prior weeks.  Additionally had been receiving home therapies with a plan to be admitted to [facility] for ongoing therapies even prior to his hospital admission.  Of note, his stay in the TCU was complicated by C. difficile colitis suspected be due to antibiotics exposure given nonsurgical treatment of cholecystitis.  This overall did resolve.    He was last seen by colleague Dora Saldana, NP 3/8 and 3/15/2023.  At that time there was concern for elevated blood sugars running 300s to 400s.  He had been resumed on pioglitazone at that time for 2 weeks.  Of note he was eating significantly better and has gained 7 pounds in the 2 weeks prior as well.  Pioglitazone was increased to 30 mg daily.  A1c did return elevated 9.2% and he was continued to have episodes of hypoglycemia.  Was started on 10 units of Lantus at night with same dose of pioglitazone and metformin as prior.    Additionally there have been concerns with depression though overall stable on citalopram and mirtazapine.  Though concerns were raised recently, see below     \"3/24/2023 13:57 Spiritual Life/   Note Text: 1:1 visit with Barak, who said on the depression scale, he is 45 on a scale of 1 to 10. Really misses his neighborhood, house and " "independence. Is 100% Maced[place], and a member of [employer] on [address] since age 7. His wife  in 2020. Has a son and daughter. Participates in weekly Mu-ism services on his computer. The  and care team visit Barak here and occastionally bring Communion. He loves to garden and bake pies and cookies. Closed our visit with a prayer. Will continue to follow.\"    Today, says he is doing pretty well.  He has resumed therapies and is doing well \"at the gym.\"  Enjoys going to therapy.  He is not experiencing shortness of breath though occasionally has chest pains with therapies.  His appetites been good and remains significantly improved from previous.    He does say his mood is not bad currently.  He has become more accepting that he will be staying at [facility] and awaiting a place at the [facility].  His mood has improved with this.  Additionally he continues to speak with the 's and finds this helpful.  He saw them earlier even today and let them know and he wishes to continue to attend Mu-ism services.  Does say he does not feel anxious or worried about things.  He is sleeping well.  [place] can b[place] more often there.    He is currently not too involved in many activities.  Says he mainly eats, goes to therapy, watch that shows.  Does like to keep up with others from his prior Mu-ism as well as Mu-ism services here.  Notably, his family and Mu-ism community are very important to him.  Make his life enjoyable.  His son is visiting a couple times a week and his daughter typically comes to see him on weekends.    NH ROS    Nursing concerns: None today.      Appetite: Good.      Weight changes: Increased 130--> 137lb.     Bowel: Occasionally incontinent.      Bladder: Freq incontinent.      Skin: No concerns.      Sleep: see HPI.      Mood: see HPI.  PHQ 9 = 5    Behavior: No concerns.      Mobility: Ambulates " "with a walker.      ADL assistance: 1A with most activities.      Pain: No significant pain .    Objective:   BP (!) 147/63   Pulse 74   Temp 97.4  F (36.3  C)   Resp 18   Ht 1.676 m (5' 6\")   Wt 59.3 kg (130 lb 12.8 oz)   SpO2 94%   BMI 21.11 kg/m      GENERAL APPEARANCE: Laying in bed comfortably, NAD  HENT:  Moderate temporal atrophy, mildly [place]  PULM  Normal WOB on RA, lungs CTAB, no wheezes or crackles  CV:  RRR (paced), holosystolic murmur, no LE edema  ABDOMEN: Abdomen  soft, not tender, not distended, BS normal and active throughout   NEURO: Alert, answering questions appropriately, normal thought processes; CN II-XII grossly intact, Ambulates independently with walker    Recent labs in EPIC reviewed by me today.     Assessment/Plan:      ICD-10-CM    1. PAD (peripheral artery disease) (H)  I73.9       2. NSTEMI (non-ST elevated myocardial infarction) (H)  I21.4       3. Chronic heart failure with preserved ejection fraction (HFpEF) (H)  I50.32       4. S/P CABG (coronary artery bypass graft)  Z95.1       5. History of Clostridioides difficile colitis  Z86.19       6. History of acute cholecystitis  Z87.19       7. Physical deconditioning  R53.81       8. Frailty  R54         Overall doing well since his move from [facility] to [facility].  Planning to continue with long-term care at the [facility] when bed available.  Has had significant improvement in oral intake and associated weight.  Mood has improved as well as he acclimates to his new situation.  He does have significant cardiovascular disease which is overall stable.  Likely some ongoing anginal symptoms with therapy which we will continue to manage medically.  Lantus was recently added to his regimen for elevated blood sugars.  He has had no episodes of hypoglycemia and is tolerating insulin well.  Blood sugars have been 100s to 200s, appropriate for his age and function.     No changes today.    MED REC REQUIRED  Post Medication Reconciliation " Status: medication reconcilation previously completed during another office visit      Orders:  - NNO    Benjamin Rosenstein, MD, MA  [facility] [place] [facility]    This note was completed with the assistance of dictation software. Typos and word substitution-errors are expected and unintended.    43 MINUTES SPENT BY ME on the date of service doing chart review, history, exam, documentation & further activities per the note.           Sincerely,        Benjamin Rosenstein, MD

## 2023-04-27 NOTE — LETTER
2023        RE: Barak Berry  [address] [place] [address]        [facility]    Name:   Barak Berry  :   1932  MRN:    [id number]     Facility:   [facility] ([facility]) [[id number]]   Room: TCU  /[place] [address]  Code Status: DNR and POLST AVAILABLE -     DOS: 2023    PCP:  Karel Petit MD     CHIEF COMPLAINT / REASON FOR VISIT:  Chief Complaint   Patient presents with     Discharge Summary Nursing Home     Initially in TCU following mechanical fall, then moving into LTC and [facility].  Will be moving to the [facility] on .      [facility] from 2022 until 2023 (acute cholecystitis, NSTEMI)  [facility] from 2023 until 2023 (mechanical fall)  [facility] TCU from 2023 until 2023 (moving into LTC)      HPI: Barak is a 89 year old male with a past medical history significant for diabetes mellitus type 2, essential hypertension, CKD stage III, CAD (s/p CABG , ), chronic HFpEF, sick sinus syndrome (s/p pacemaker), PAD, dyslipidemia, and GERD, who presented to the ED with nausea on 2022.  He had been in his normal state of health until that afternoon, will when he developed severe nausea and vomiting.  He subsequently developed left-sided chest pain, and heavy pressure in nature (no radiation to arm/jaw), and he was having low back pain as well.  Seen by cardiology, noting: NSTEMI with known residual CAD which would require complicated PCI.  TTE with EF 55%, no regional wall motion abnormalities, mild to moderate AV stenosis with mild AV regurgitation but no significant change compared to echo of 2022.  Given his history, cardiology opted for medical management, beginning with 48 hours of IV heparin.  He is to continue ASA, atorvastatin, carvedilol, clopidogrel, and isosorbide mononitrate.  He was also discharged on chlorthalidone.  Seen by general surgery re: Imaging concerning for  "cholecystitis.  Given that he is a very high surgical risk, there were no plans for surgical or IR intervention, and he had been doing well on antibiotic therapy.  Surgery recommended 14 days of antibiotic therapy and discharged on cefdinir and metronidazole.      He suffered a mechanical fall on 01/30/2023 and was admitted to [facility] after a fall at home.  He struggled to get up from the bathroom floor.  He described a feeling like his legs were giving out, and he did sustain a contusion to the left side of his head.  He had had multiple falls in the 2 weeks prior to his admission.  He reported falling 4 times before deciding he needed therapy.  EKG showed paced rhythm, BMP mild hyperglycemia and baseline creatinine of 1.48.  CBC unremarkable except for baseline hemoglobin 10.7 and known thrombocytopenia.  Troponin was elevated though downtrending.  He underwent a CT head revealing no acute intracranial hemorrhage or evidence of infarction.  Otherwise, typical age-related chronic small vessel disease and moderate generalized volume loss.  CT cervical spine without evidence of vertebral fracture or body height loss.  No canal stenosis.  The following day, per his earlier plans, he was admitted to this facility.      CURRENT/RECENT TCU ISSUES    Disposition  -- Son recently signed [facility] paperwork.  This will start on 02/21.  He is not too happy about this.  He was walking, but now that he can't go home, he is decided to stop walking.  He reports being depressed.  He tells me, \"I'd rather be home.  They're going to move me to the permanent area eventually.  He has been living in a two-story house for 62 years.  -- On 02/01, started on vancomycin for C. difficile, and Lomotil was discontinued.  He is having some loose stools now, \"though not as much.\"  -- Questions having suffered an NSTEMI.  He said, \"they didn't convince me [that I had a heart attack].\"  -- He has felt weaker since his hospitalization in " "December.  He appreciates working with therapies to try and strengthen his legs.  As mentioned, this isn't something he had planned.  -- Asked about any possible memory issues earlier, he responded, \"not major, some.\"  -- Knees tend to \"knot up\" when walking, but they eventually loosen up.  They bother him despite bilateral knee replacements.    Cognition  Depression  -- More confused than he often lets on.  He does appear alert and oriented today.  -- Scored 13/30 on the SLUMS, or requiring 24-hour supervision.  -- Incontinent of urine.  When asked if he needs to be changed when the incontinence briefs is soaked, he says he's fine.  In essence, he is unaware of his needs.  -- Denies any depression.  Thinks it's improved.  Likes the idea of having a shower in his room.  -- Citalopram decreased to 20 mg daily on 03/08.    Frequent falls  -- He says he fell 4 times following admission to the TCU, the last fall occurring on 02/10/2023.  I did mention this to the nurse manager.  No need to write an order.  The reason?  He uses the call light when he needs to urinate, but he ultimately cannot wait.  He sustained a small skin tear to the elbow.  Does have history of lumbar vertebral fracture.    Diabetes mellitus type 2  -- Blood glucose levels were initially \"way too high,\" according to the patient and his nurse.  They were running in the 200s and occasional 300s.  -- On 02/01, started 500 mg of metformin XR daily.  I believe this had been stopped earlier as possible cause of diarrhea.  As he was able to tolerate that dose, we increased it to 1000 mg daily.  Also taking glipizide, and we may wish to take him off, depending on efficacy of metformin and pioglitazone.  -- Has been taken off of pioglitazone, and we should consider reevaluating and restarting, as he had been tolerating it well prior to his hospitalization.  We resumed it at 30 mg daily.  -- Last A1c prior to admission was 7.2%, though I see 9.5 on " "03/15/2023.  -- Has previously had hypoglycemia while on insulin.  -- With blood glucose levels looking good, checks were decreased to daily on .  If a F/U A1c is still within goal, we may consider discontinuing BG checks altogether.    Chronic HFpEF  -- On both chlorthalidone and furosemide.  Denies any orthopnea, though he does like the head of his bed elevated for comfort.    C. difficile colitis/abdominal discomfort  -- Testing positive on 2023.  -- As noted above, placed on extended course of vancomycin.  I believe he underwent treatment during his previous hospitalization.    -- Nursing staff reported complaints of nausea and stomachache on .  He did have a formed BM at that morning.  He does have a history of cholecystitis where symptoms were similar.  So we ordered abdominal US and ondansetron for the nausea.  Further, ordered that his metformin be given with food.   -- Abdominal x-rays were done instead of the ultrasound initially, and it showed \"nonspecific bowel gas pattern without obstruction; mild increase of feces;; no renal stones; mild gastric distention.\"  Ultrasound pending.  -- Abdominal pain/discomfort has resolved, and stools are formed.    Chronic kidney disease  -- Stage IIIa.  Care observed with nephrotoxic agents, diuretics.    Peripheral arterial disease  -- On clopidogrel and aspirin.    Family  -- His wife  2 years ago.  They were  for 62 years.  It still affects him in some ways.    Discharge planning  -- Has been living alone in a house, with his son and daughter visiting about 3 times per week.  His last care conference occurred on .  Son signed paperwork for LTC which started on .  -- He will be moving to the [facility] on .      ROS: In addition to symptoms noted above, he has also endorsed dry eyes and has bilateral ectropion.  Sleep, he says, can be \"hit or miss.\"  Some days he sleeps well, others not.   10 point ROS of systems including " Constitutional, Eyes, Respiratory, Cardiovascular, Gastroenterology, Genitourinary, Integumentary, Muscularskeletal, Psychiatric (denies any depressive symptoms) were all negative except for pertinent positives noted in my HPI.      History reviewed. No pertinent past medical history.           History reviewed. No pertinent family history.     Social History     Socioeconomic History     Marital status:      Spouse name: None     Number of children: None     Years of education: None     Highest education level: None   Tobacco Use     Smoking status: Former     Packs/day: 1.00     Years: 45.00     Pack years: 45.00     Types: Cigarettes     Quit date: 1960     Years since quittin.1     Smokeless tobacco: Never   Substance and Sexual Activity     Alcohol use: Yes     Alcohol/week: 1.0 standard drink     Comment: Alcoholic Drinks/day: rare     Drug use: No       MEDICATIONS: Reviewed from the MAR, physician orders, and/or earlier progress notes.  MED REC REQUIRED  Post Medication Reconciliation Status: medication reconcilation previously completed during another office visit    Current Outpatient Medications   Medication Sig     acetaminophen (TYLENOL) 650 MG CR tablet Take 1,300 mg by mouth     aspirin 81 MG EC tablet [ASPIRIN 81 MG EC TABLET] Take 81 mg by mouth daily.     atorvastatin (LIPITOR) 40 MG tablet Take 40 mg by mouth     carvedilol (COREG) 25 MG tablet Take 25 mg by mouth     citalopram (CELEXA) 10 MG tablet Take 20 mg by mouth     clopidogrel (PLAVIX) 75 MG tablet Take 1 tablet by mouth every morning     furosemide (LASIX) 20 MG tablet Take 20 mg by mouth     gabapentin (NEURONTIN) 300 MG capsule Take 300 mg by mouth     hydrALAZINE (APRESOLINE) 25 MG tablet Take 75 mg by mouth 2 times daily     insulin glargine (LANTUS PEN) 100 UNIT/ML pen Inject 10 Units Subcutaneous At Bedtime     isosorbide mononitrate (IMDUR) 30 MG 24 hr tablet Take 30 mg by mouth daily Give with 120 mg tab      "isosorbide mononitrate CR (IMDUR) 120 MG 24 HR ER tablet Take 120 mg by mouth     metFORMIN (GLUCOPHAGE XR) 500 MG 24 hr tablet Take 1,000 mg by mouth daily (with dinner)     mirtazapine (REMERON) 7.5 MG tablet Take 7.5 mg by mouth At Bedtime     Multiple Vitamins-Minerals (SYSTANE ICAPS AREDS2) CAPS Take 1 capsule by mouth daily     nitroglycerin (NITROSTAT) 0.4 MG SL tablet [NITROGLYCERIN (NITROSTAT) 0.4 MG SL TABLET] Place 0.4 mg under the tongue as needed.     ondansetron (ZOFRAN) 4 MG tablet Take 1 tablet (4 mg) by mouth every 8 hours as needed for nausea or vomiting     pantoprazole (PROTONIX) 40 MG EC tablet Take 40 mg by mouth daily     pioglitazone (ACTOS) 45 MG tablet Take 30 mg by mouth     terazosin (HYTRIN) 10 MG capsule Take 1 capsule by mouth At Bedtime     vitamins  A,C,E-zinc-copper (ICAPS AREDS) 7,160-113-100 unit-mg-unit TbEC [VITAMINS  A,C,E-ZINC-COPPER (ICAPS AREDS) 7,160-113-100 UNIT-MG-UNIT TBEC] Take 1 capsule by mouth daily.     No current facility-administered medications for this visit.     ALLERGIES:   Allergies   Allergen Reactions     Acetaminophen Nausea and Vomiting     Other reaction(s): Nausea/vomiting     Amlodipine Unknown     Cilostazol Unknown     Hydrocodone      Other reaction(s): Nausea/vomiting     Vicodin [Hydrocodone-Acetaminophen] Unknown     DIET: Regular, regular texture, thin liquids.  Mighty Shake Plus nutritional supplement.    Vitals:    04/27/23 1452   BP: (!) 148/64   Pulse: 82   Resp: 18   Temp: 97.4  F (36.3  C)   SpO2: 94%   Weight: 62.5 kg (137 lb 12.8 oz)   Height: 1.676 m (5' 6\")     Wt Readings from Last 4 Encounters:   04/27/23 62.5 kg (137 lb 12.8 oz)   04/24/23 59.3 kg (130 lb 12.8 oz)   03/14/23 58.5 kg (129 lb)   03/08/23 58.5 kg (129 lb)     Body mass index is 22.24 kg/m .  Body surface area is 1.71 meters squared.    EXAMINATION:   General: Pleasant, alert, and conversant elderly male, lying in bed.  When last seen, he was upset about moving into " long-term care.  He seems more accepting today.  Head: Normocephalic and atraumatic.   Eyes: PERRLA, sclerae clear.  Bilateral ectropion.  ENT: Moist oral mucosa.  He has his own teeth.  No nasal discharge.  Hearing intact.  Cardiovascular: Regular rate and rhythm with a 3/6 systolic ejection murmur at the apex, unchanged from previous.  I could previously appreciate intermittent ectopy (every 5-6 beats).  Respiratory: Lungs CTAB.   Abdomen: Nondistended.   Musculoskeletal/Extremities: Age-related arthritic changes.  No peripheral edema.  Integument: No rashes, clinically significant lesions, or skin breakdown.   Cognitive/Psychiatric: Alert and oriented with euthymic affect.    DIAGNOSTICS:   No results found for this or any previous visit (from the past 240 hour(s)).   Last Comprehensive Metabolic Panel:  Sodium   Date Value Ref Range Status   03/01/2023 138 136 - 145 mmol/L Final     Potassium   Date Value Ref Range Status   03/01/2023 4.4 3.4 - 5.3 mmol/L Final   04/13/2022 4.9 3.5 - 5.0 mmol/L Final     Chloride   Date Value Ref Range Status   03/01/2023 101 98 - 107 mmol/L Final   04/13/2022 105 98 - 107 mmol/L Final     Carbon Dioxide (CO2)   Date Value Ref Range Status   03/01/2023 24 22 - 29 mmol/L Final   04/13/2022 23 22 - 31 mmol/L Final     Anion Gap   Date Value Ref Range Status   03/01/2023 13 7 - 15 mmol/L Final   04/13/2022 13 5 - 18 mmol/L Final     Glucose   Date Value Ref Range Status   03/01/2023 337 (H) 70 - 99 mg/dL Final   04/13/2022 194 (H) 70 - 125 mg/dL Final     Urea Nitrogen   Date Value Ref Range Status   03/01/2023 59.4 (H) 8.0 - 23.0 mg/dL Final   04/13/2022 68 (H) 8 - 28 mg/dL Final     Creatinine   Date Value Ref Range Status   03/01/2023 1.30 (H) 0.67 - 1.17 mg/dL Final     GFR Estimate   Date Value Ref Range Status   03/01/2023 52 (L) >60 mL/min/1.73m2 Final     Comment:     eGFR calculated using 2021 CKD-EPI equation.   06/02/2021 45 (L) >60 mL/min/1.73m2 Final     Calcium   Date  Value Ref Range Status   03/01/2023 9.2 8.2 - 9.6 mg/dL Final     Bilirubin Total   Date Value Ref Range Status   03/01/2023 0.3 <=1.2 mg/dL Final     Alkaline Phosphatase   Date Value Ref Range Status   03/01/2023 68 40 - 129 U/L Final     ALT   Date Value Ref Range Status   03/01/2023 17 10 - 50 U/L Final     AST   Date Value Ref Range Status   03/01/2023 30 10 - 50 U/L Final             Lab Results   Component Value Date    WBC 6.7 02/06/2023     Lab Results   Component Value Date    RBC 3.05 02/06/2023     Lab Results   Component Value Date    HGB 10.2 02/06/2023     Lab Results   Component Value Date    HCT 31.7 02/06/2023     Lab Results   Component Value Date     02/06/2023     Lab Results   Component Value Date    MCH 33.4 02/06/2023     Lab Results   Component Value Date    MCHC 32.2 02/06/2023     Lab Results   Component Value Date    RDW 13.2 02/06/2023     Lab Results   Component Value Date     02/06/2023     Lab Results   Component Value Date    A1C 9.5 03/15/2023    A1C 7.5 12/26/2018    A1C 7.0 07/27/2018    A1C 6.9 01/26/2016    A1C 6.6 09/11/2014       ASSESSMENT/Plan:      ICD-10-CM    1. History of non-ST elevation myocardial infarction (NSTEMI)  I25.2       2. History of cholecystitis  Z87.19       3. Type 2 diabetes mellitus without complication, without long-term current use of insulin (H)  E11.9       4. C. difficile colitis  A04.72       5. Stage 3a chronic kidney disease (H)  N18.31       6. Coronary artery disease involving coronary bypass graft of native heart without angina pectoris  I25.810       7. SSS (sick sinus syndrome) (H)  I49.5       8. Cardiac pacemaker in situ  Z95.0       9. Chronic heart failure with preserved ejection fraction (HFpEF) (H)  I50.32       10. PAD (peripheral artery disease) (H)  I73.9       11. Thrombocytopenia (H)  D69.6       12. Macrocytic anemia  D53.9           DISCHARGE FACE TO FACE:  I certify that this patient is under my care and that I  had a face-to-face encounter that meets the physician face-to-face encounter requirements with this patient.     Date of Face-to-Face Encounter: 04/27/2023    The patient is, or has been, under my care and I have initiated the establishment of the plan of care. This patient will be followed by a physician who will periodically review the plan of care.  Initial follow-up should be within 7-10 days.    Time spent in this encounter was greater than 30 minutes, involving routine check, documentation, and discharge planning.      The above has been created using voice recognition software. Please be aware that this may unintentionally  produce inaccuracies and/or nonsensical sentences.      Electronically signed by: PAULINA Manjarrez CNP        Sincerely,        PAULINA Manjarrez CNP

## 2023-04-30 NOTE — PROGRESS NOTES
[facility]    Name:   Barak Berry  :   1932  MRN:    [id number]     Facility:   [facility] ([facility]) [[id number]]   Room: TCU  /[place] [address]  Code Status: DNR and POLST AVAILABLE -     DOS: 2023    PCP:  Karel Petit MD     CHIEF COMPLAINT / REASON FOR VISIT:  Chief Complaint   Patient presents with     Discharge Summary Nursing Home     Initially in TCU following mechanical fall, then moving into LTC and [facility].  Will be moving to the [facility] on .      [facility] from 2022 until 2023 (acute cholecystitis, NSTEMI)  [facility] from 2023 until 2023 (mechanical fall)  [facility] TCU from 2023 until 2023 (moving into LT)      HPI: Barak is a 89 year old male with a past medical history significant for diabetes mellitus type 2, essential hypertension, CKD stage III, CAD (s/p CABG , ), chronic HFpEF, sick sinus syndrome (s/p pacemaker), PAD, dyslipidemia, and GERD, who presented to the ED with nausea on 2022.  He had been in his normal state of health until that afternoon, will when he developed severe nausea and vomiting.  He subsequently developed left-sided chest pain, and heavy pressure in nature (no radiation to arm/jaw), and he was having low back pain as well.  Seen by cardiology, noting: NSTEMI with known residual CAD which would require complicated PCI.  TTE with EF 55%, no regional wall motion abnormalities, mild to moderate AV stenosis with mild AV regurgitation but no significant change compared to echo of 2022.  Given his history, cardiology opted for medical management, beginning with 48 hours of IV heparin.  He is to continue ASA, atorvastatin, carvedilol, clopidogrel, and isosorbide mononitrate.  He was also discharged on chlorthalidone.  Seen by general surgery re: Imaging concerning for cholecystitis.  Given that he is a very high surgical risk, there were no plans for surgical or IR  "intervention, and he had been doing well on antibiotic therapy.  Surgery recommended 14 days of antibiotic therapy and discharged on cefdinir and metronidazole.      He suffered a mechanical fall on 01/30/2023 and was admitted to [facility] after a fall at home.  He struggled to get up from the bathroom floor.  He described a feeling like his legs were giving out, and he did sustain a contusion to the left side of his head.  He had had multiple falls in the 2 weeks prior to his admission.  He reported falling 4 times before deciding he needed therapy.  EKG showed paced rhythm, BMP mild hyperglycemia and baseline creatinine of 1.48.  CBC unremarkable except for baseline hemoglobin 10.7 and known thrombocytopenia.  Troponin was elevated though downtrending.  He underwent a CT head revealing no acute intracranial hemorrhage or evidence of infarction.  Otherwise, typical age-related chronic small vessel disease and moderate generalized volume loss.  CT cervical spine without evidence of vertebral fracture or body height loss.  No canal stenosis.  The following day, per his earlier plans, he was admitted to this facility.      CURRENT/RECENT TCU ISSUES    Disposition  -- Son recently signed LTC paperwork.  This will start on 02/21.  He is not too happy about this.  He was walking, but now that he can't go home, he is decided to stop walking.  He reports being depressed.  He tells me, \"I'd rather be home.  They're going to move me to the permanent area eventually.  He has been living in a two-story house for 62 years.  -- On 02/01, started on vancomycin for C. difficile, and Lomotil was discontinued.  He is having some loose stools now, \"though not as much.\"  -- Questions having suffered an NSTEMI.  He said, \"they didn't convince me [that I had a heart attack].\"  -- He has felt weaker since his hospitalization in December.  He appreciates working with therapies to try and strengthen his legs.  As mentioned, this " "isn't something he had planned.  -- Asked about any possible memory issues earlier, he responded, \"not major, some.\"  -- Knees tend to \"knot up\" when walking, but they eventually loosen up.  They bother him despite bilateral knee replacements.    Cognition  Depression  -- More confused than he often lets on.  He does appear alert and oriented today.  -- Scored 13/30 on the SLUMS, or requiring 24-hour supervision.  -- Incontinent of urine.  When asked if he needs to be changed when the incontinence briefs is soaked, he says he's fine.  In essence, he is unaware of his needs.  -- Denies any depression.  Thinks it's improved.  Likes the idea of having a shower in his room.  -- Citalopram decreased to 20 mg daily on 03/08.    Frequent falls  -- He says he fell 4 times following admission to the TCU, the last fall occurring on 02/10/2023.  I did mention this to the nurse manager.  No need to write an order.  The reason?  He uses the call light when he needs to urinate, but he ultimately cannot wait.  He sustained a small skin tear to the elbow.  Does have history of lumbar vertebral fracture.    Diabetes mellitus type 2  -- Blood glucose levels were initially \"way too high,\" according to the patient and his nurse.  They were running in the 200s and occasional 300s.  -- On 02/01, started 500 mg of metformin XR daily.  I believe this had been stopped earlier as possible cause of diarrhea.  As he was able to tolerate that dose, we increased it to 1000 mg daily.  Also taking glipizide, and we may wish to take him off, depending on efficacy of metformin and pioglitazone.  -- Has been taken off of pioglitazone, and we should consider reevaluating and restarting, as he had been tolerating it well prior to his hospitalization.  We resumed it at 30 mg daily.  -- Last A1c prior to admission was 7.2%, though I see 9.5 on 03/15/2023.  -- Has previously had hypoglycemia while on insulin.  -- With blood glucose levels looking good, " "checks were decreased to daily on .  If a F/U A1c is still within goal, we may consider discontinuing BG checks altogether.    Chronic HFpEF  -- On both chlorthalidone and furosemide.  Denies any orthopnea, though he does like the head of his bed elevated for comfort.    C. difficile colitis/abdominal discomfort  -- Testing positive on 2023.  -- As noted above, placed on extended course of vancomycin.  I believe he underwent treatment during his previous hospitalization.    -- Nursing staff reported complaints of nausea and stomachache on .  He did have a formed BM at that morning.  He does have a history of cholecystitis where symptoms were similar.  So we ordered abdominal US and ondansetron for the nausea.  Further, ordered that his metformin be given with food.   -- Abdominal x-rays were done instead of the ultrasound initially, and it showed \"nonspecific bowel gas pattern without obstruction; mild increase of feces;; no renal stones; mild gastric distention.\"  Ultrasound pending.  -- Abdominal pain/discomfort has resolved, and stools are formed.    Chronic kidney disease  -- Stage IIIa.  Care observed with nephrotoxic agents, diuretics.    Peripheral arterial disease  -- On clopidogrel and aspirin.    Family  -- His wife  2 years ago.  They were  for 62 years.  It still affects him in some ways.    Discharge planning  -- Has been living alone in a house, with his son and daughter visiting about 3 times per week.  His last care conference occurred on .  Son signed paperwork for LTC which started on .  -- He will [place] the [place] on .      ROS: In addition to symptoms noted above, he has also endorsed dry eyes and has bilateral ectropion.  Sleep, he says, can be \"hit or miss.\"  Some days he sleeps well, others not.   10 point ROS of systems including Constitutional, Eyes, Respiratory, Cardiovascular, Gastroenterology, Genitourinary, Integumentary, " Muscularskeletal, Psychiatric (denies any depressive symptoms) were all negative except for pertinent positives noted in my HPI.      History reviewed. No pertinent past medical history.           History reviewed. No pertinent family history.     Social History     Socioeconomic History     Marital status:      Spouse name: None     Number of children: None     Years of education: None     Highest education level: None   Tobacco Use     Smoking status: Former     Packs/day: 1.00     Years: 45.00     Pack years: 45.00     Types: Cigarettes     Quit date: 1960     Years since quittin.1     Smokeless tobacco: Never   Substance and Sexual Activity     Alcohol use: Yes     Alcohol/week: 1.0 standard drink     Comment: Alcoholic Drinks/day: rare     Drug use: No       MEDICATIONS: Reviewed from the MAR, physician orders, and/or earlier progress notes.  MED REC REQUIRED  Post Medication Reconciliation Status: medication reconcilation previously completed during another office visit    Current Outpatient Medications   Medication Sig     acetaminophen (TYLENOL) 650 MG CR tablet Take 1,300 mg by mouth     aspirin 81 MG EC tablet [ASPIRIN 81 MG EC TABLET] Take 81 mg by mouth daily.     atorvastatin (LIPITOR) 40 MG tablet Take 40 mg by mouth     carvedilol (COREG) 25 MG tablet Take 25 mg by mouth     citalopram (CELEXA) 10 MG tablet Take 20 mg by mouth     clopidogrel (PLAVIX) 75 MG tablet Take 1 tablet by mouth every morning     furosemide (LASIX) 20 MG tablet Take 20 mg by mouth     gabapentin (NEURONTIN) 300 MG capsule Take 300 mg by mouth     hydrALAZINE (APRESOLINE) 25 MG tablet Take 75 mg by mouth 2 times daily     insulin glargine (LANTUS PEN) 100 UNIT/ML pen Inject 10 Units Subcutaneous At Bedtime     isosorbide mononitrate (IMDUR) 30 MG 24 hr tablet Take 30 mg by mouth daily Give with 120 mg tab     isosorbide mononitrate CR (IMDUR) 120 MG 24 HR ER tablet Take 120 mg by mouth     metFORMIN  "(GLUCOPHAGE XR) 500 MG 24 hr tablet Take 1,000 mg by mouth daily (with dinner)     mirtazapine (REMERON) 7.5 MG tablet Take 7.5 mg by mouth At Bedtime     Multiple Vitamins-Minerals (SYSTANE ICAPS AREDS2) CAPS Take 1 capsule by mouth daily     nitroglycerin (NITROSTAT) 0.4 MG SL tablet [NITROGLYCERIN (NITROSTAT) 0.4 MG SL TABLET] Place 0.4 mg under the tongue as needed.     ondansetron (ZOFRAN) 4 MG tablet Take 1 tablet (4 mg) by mouth every 8 hours as needed for nausea or vomiting     pantoprazole (PROTONIX) 40 MG EC tablet Take 40 mg by mouth daily     pioglitazone (ACTOS) 45 MG tablet Take 30 mg by mouth     terazosin (HYTRIN) 10 MG capsule Take 1 capsule by mouth At Bedtime     vitamins  A,C,E-zinc-copper (ICAPS AREDS) 7,160-113-100 unit-mg-unit TbEC [VITAMINS  A,C,E-ZINC-COPPER (ICAPS AREDS) 7,160-113-100 UNIT-MG-UNIT TBEC] Take 1 capsule by mouth daily.     No current facility-administered medications for this visit.     ALLERGIES:   Allergies   Allergen Reactions     Acetaminophen Nausea and Vomiting     Other reaction(s): Nausea/vomiting     Amlodipine Unknown     Cilostazol Unknown     Hydrocodone      Other reaction(s): Nausea/vomiting     Vicodin [Hydrocodone-Acetaminophen] Unknown     DIET: Regular, regular texture, thin liquids.  Mighty Shake Plus nutritional supplement.    Vitals:    04/27/23 1452   BP: (!) 148/64   Pulse: 82   Resp: 18   Temp: 97.4  F (36.3  C)   SpO2: 94%   Weight: 62.5 kg (137 lb 12.8 oz)   Height: 1.676 m (5' 6\")     Wt Readings from Last 4 Encounters:   04/27/23 62.5 kg (137 lb 12.8 oz)   04/24/23 59.3 kg (130 lb 12.8 oz)   03/14/23 58.5 kg (129 lb)   03/08/23 58.5 kg (129 lb)     Body mass index is 22.24 kg/m .  Body surface area is 1.71 meters squared.    EXAMINATION:   General: Pleasant, alert, and conversant elderly male, lying in bed.  When last seen, he was upset about moving into long-term care.  He seems more accepting today.  Head: Normocephalic and atraumatic.   Eyes: " PERRLA, sclerae clear.  Bilateral ectropion.  ENT: Moist oral mucosa.  He has his own teeth.  No nasal discharge.  Hearing intact.  Cardiovascular: Regular rate and rhythm with a 3/6 systolic ejection murmur at the apex, unchanged from previous.  I could previously appreciate intermittent ectopy (every 5-6 beats).  Respiratory: Lungs CTAB.   Abdomen: Nondistended.   Musculoskeletal/Extremities: Age-related arthritic changes.  No peripheral edema.  Integument: No rashes, clinically significant lesions, or skin breakdown.   Cognitive/Psychiatric: Alert and oriented with euthymic affect.    DIAGNOSTICS:   No results found for this or any previous visit (from the past 240 hour(s)).   Last Comprehensive Metabolic Panel:  Sodium   Date Value Ref Range Status   03/01/2023 138 136 - 145 mmol/L Final     Potassium   Date Value Ref Range Status   03/01/2023 4.4 3.4 - 5.3 mmol/L Final   04/13/2022 4.9 3.5 - 5.0 mmol/L Final     Chloride   Date Value Ref Range Status   03/01/2023 101 98 - 107 mmol/L Final   04/13/2022 105 98 - 107 mmol/L Final     Carbon Dioxide (CO2)   Date Value Ref Range Status   03/01/2023 24 22 - 29 mmol/L Final   04/13/2022 23 22 - 31 mmol/L Final     Anion Gap   Date Value Ref Range Status   03/01/2023 13 7 - 15 mmol/L Final   04/13/2022 13 5 - 18 mmol/L Final     Glucose   Date Value Ref Range Status   03/01/2023 337 (H) 70 - 99 mg/dL Final   04/13/2022 194 (H) 70 - 125 mg/dL Final     Urea Nitrogen   Date Value Ref Range Status   03/01/2023 59.4 (H) 8.0 - 23.0 mg/dL Final   04/13/2022 68 (H) 8 - 28 mg/dL Final     Creatinine   Date Value Ref Range Status   03/01/2023 1.30 (H) 0.67 - 1.17 mg/dL Final     GFR Estimate   Date Value Ref Range Status   03/01/2023 52 (L) >60 mL/min/1.73m2 Final     Comment:     eGFR calculated using 2021 CKD-EPI equation.   06/02/2021 45 (L) >60 mL/min/1.73m2 Final     Calcium   Date Value Ref Range Status   03/01/2023 9.2 8.2 - 9.6 mg/dL Final     Bilirubin Total   Date  Value Ref Range Status   03/01/2023 0.3 <=1.2 mg/dL Final     Alkaline Phosphatase   Date Value Ref Range Status   03/01/2023 68 40 - 129 U/L Final     ALT   Date Value Ref Range Status   03/01/2023 17 10 - 50 U/L Final     AST   Date Value Ref Range Status   03/01/2023 30 10 - 50 U/L Final             Lab Results   Component Value Date    WBC 6.7 02/06/2023     Lab Results   Component Value Date    RBC 3.05 02/06/2023     Lab Results   Component Value Date    HGB 10.2 02/06/2023     Lab Results   Component Value Date    HCT 31.7 02/06/2023     Lab Results   Component Value Date     02/06/2023     Lab Results   Component Value Date    MCH 33.4 02/06/2023     Lab Results   Component Value Date    MCHC 32.2 02/06/2023     Lab Results   Component Value Date    RDW 13.2 02/06/2023     Lab Results   Component Value Date     02/06/2023     Lab Results   Component Value Date    A1C 9.5 03/15/2023    A1C 7.5 12/26/2018    A1C 7.0 07/27/2018    A1C 6.9 01/26/2016    A1C 6.6 09/11/2014       ASSESSMENT/Plan:      ICD-10-CM    1. History of non-ST elevation myocardial infarction (NSTEMI)  I25.2       2. History of cholecystitis  Z87.19       3. Type 2 diabetes mellitus without complication, without long-term current use of insulin (H)  E11.9       4. C. difficile colitis  A04.72       5. Stage 3a chronic kidney disease (H)  N18.31       6. Coronary artery disease involving coronary bypass graft of native heart without angina pectoris  I25.810       7. SSS (sick sinus syndrome) (H)  I49.5       8. Cardiac pacemaker in situ  Z95.0       9. Chronic heart failure with preserved ejection fraction (HFpEF) (H)  I50.32       10. PAD (peripheral artery disease) (H)  I73.9       11. Thrombocytopenia (H)  D69.6       12. Macrocytic anemia  D53.9           DISCHARGE FACE TO FACE:  I certify that this patient is under my care and that I had a face-to-face encounter that meets the physician face-to-face encounter requirements  with this patient.     Date of Face-to-Face Encounter: 04/27/2023    The patient is, or has been, under my care and I have initiated the establishment of the plan of care. This patient will be followed by a physician who will periodically review the plan of care.  Initial follow-up should be within 7-10 days.    Time spent in this encounter was greater than 30 minutes, involving routine check, documentation, and discharge planning.      The above has been created using voice recognition software. Please be aware that this may unintentionally  produce inaccuracies and/or nonsensical sentences.      Electronically signed by: PAULINA Manjarrez CNP

## 2023-05-09 NOTE — TELEPHONE ENCOUNTER
"[facility] Triage Nurse Telephone Encounter    Provider: CELESTE Perrin  Facility: [facility] Facility Type:  LTC    Caller: Samuel   Call Back Number: [phone]    Allergies:    Allergies   Allergen Reactions     Acetaminophen Nausea and Vomiting     Other reaction(s): Nausea/vomiting     Amlodipine Unknown     Cilostazol Unknown     Hydrocodone      Other reaction(s): Nausea/vomiting     Vicodin [Hydrocodone-Acetaminophen] Unknown        Reason for call: Nursing is calling to report that the pt's BS this am was 506, this afternoon the BS was reading \"HI\". Currently on Actos 30mg, Metformin 100mg @ dinner and 10units of lantus @ HS.   Only symptom is that the pt has had diarrhea today.    Verbal Order/Direction given by Provider:   1.Start BS checks QID  2. Start insulin aspart s/s TID with meals.   Inject Subcutaneous 3 times daily (with meals)   200-250=2  251-300=4  301-350=6  351-400=8  401-450=10  450<= 12  3. Give 6units of insulin aspart x1 now  4. Increase Lantus to 16units @ HS.   5. Monitor diarrhea and call tomorrow if still having.   NP to see the pt on Thur.     Provider giving Order:  CELESTE Perrin    Verbal Order given to: Samuel Dueñas RN      "

## 2023-05-11 NOTE — LETTER
2023        RE: Barak Berry  [address] [place] [address]        [facility]    Name:   Barak Berry  :   1932  MRN:    [id number]     Facility:   [facility] () [[id number]]   Room: [place] [address]  Code Status: DNR and POLST AVAILABLE -     DOS: 2023    PCP:  Karel Petit MD     CHIEF COMPLAINT / REASON FOR VISIT:  Chief Complaint   Patient presents with     [facility] from 2022 until 2023 (acute cholecystitis, NSTEMI)  [facility] from 2023 until 2023 (mechanical fall)  [facility] from 2023 until 2023 (moving into [facility])      HPI: Barak is a 89 year old male with a past medical history significant for diabetes mellitus type 2, essential hypertension, CKD stage III, CAD (s/p CABG , ), chronic HFpEF, sick sinus syndrome (s/p pacemaker), PAD, dyslipidemia, and GERD, who presented to the ED with nausea on 2022.  He had been in his normal state of health until that afternoon, will when he developed severe nausea and vomiting.  He subsequently developed left-sided chest pain, and heavy pressure in nature (no radiation to arm/jaw), and he was having low back pain as well.  Seen by cardiology, noting: NSTEMI with known residual CAD which would require complicated PCI.  TTE with EF 55%, no regional wall motion abnormalities, mild to moderate AV stenosis with mild AV regurgitation but no significant change compared to echo of 2022.  Given his history, cardiology opted for medical management, beginning with 48 hours of IV heparin.  He is to continue ASA, atorvastatin, carvedilol, clopidogrel, and isosorbide mononitrate.  He was also discharged on chlorthalidone.  Seen by general surgery re: Imaging concerning for cholecystitis.  Given that he is a very high surgical risk, there were no plans for surgical or IR intervention, and he had been doing well  "on antibiotic therapy.  Surgery recommended 14 days of antibiotic therapy and discharged on cefdinir and metronidazole.      He suffered a mechanical fall on 01/30/2023 and was admitted to [facility] after a fall at home.  He struggled to get up from the bathroom floor.  He described a feeling like his legs were giving out, and he did sustain a contusion to the left side of his head.  He had had multiple falls in the 2 weeks prior to his admission.  He reported falling 4 times before deciding he needed therapy.  EKG showed paced rhythm, BMP mild hyperglycemia and baseline creatinine of 1.48.  CBC unremarkable except for baseline hemoglobin 10.7 and known thrombocytopenia.  Troponin was elevated though downtrending.  He underwent a CT head revealing no acute intracranial hemorrhage or evidence of infarction.  Otherwise, typical age-related chronic small vessel disease and moderate generalized volume loss.  CT cervical spine without evidence of vertebral fracture or body height loss.  No canal stenosis.  The following day, per his earlier plans, he was admitted to this facility.      CURRENT/RECENT TCU ISSUES    Disposition  -- TODAY, of particular concern is his elevated blood glucose levels.  -- Son signed [facility] paperwork, starting 02/21.  He was not too happy about this.  He was walking, but unable to go home, he decided to stop walking.  He reported being depressed.  He tells me, \"I'd rather be home.  He had been living in a two-story house for 62 years.  -- He has felt weaker since his hospitalization in December.  He appreciated working with therapies to try and strengthen his legs.  As mentioned, this isn't something he had planned.  At this juncture, he will be resuming therapy on 05/12/2023 to address weakness and deconditioning.  -- Previously asked about any possible memory issues earlier, he responded, \"not major, some.\"  -- Knees tend to \"knot up\" when walking, but they eventually loosen up.  They " "bother him despite bilateral knee replacements.    Cognition  Depression  -- More confused than he often lets on.  He does appear alert and oriented today.  -- Scored 13/30 on the SLUMS, or requiring 24-hour supervision.  -- Incontinent of urine.  When asked if he needs to be changed when the incontinence briefs is soaked, he says he's fine.  In essence, he is unaware of his needs.  -- Denies any depression.  Thinks it's improved.  Likes the idea of having a shower in his room.  -- Citalopram decreased to 20 mg daily on 03/08.    Frequent falls  -- He says he fell 4 times following admission to the TCU, the last fall occurring on 02/10/2023.  I did mention this to the nurse manager.  No need to write an order.  The reason?  He uses the call light when he needs to urinate, but he ultimately cannot wait.  He sustained a small skin tear to the elbow.  Does have history of lumbar vertebral fracture.    Diabetes mellitus type 2  -- Significantly elevated blood glucose levels have been addressed this week with an increase in glargine and the addition of sliding scale aspart.  -- I noticed a container of cookies in his room, and he says he is eating no more than 1/day.  -- Blood glucose levels were initially \"way too high,\" according to the patient and his nurse.  They were running in the 200s and occasional 300s.  On 02/01, he was started on 500 mg of metformin XR daily.  I believe this had been stopped earlier as possible cause of diarrhea.  As he was able to tolerate that dose, it was increased to 1000 mg daily.  Also taking glipizide, and we may wish to take him off, depending on efficacy of metformin and pioglitazone.  -- Had been taken off of pioglitazone, and after reevaluation, we resumed dosing at 30 mg daily.  -- Most recently, fasting BG levels were: 339, 405, 219.  At lunch: 245.  Supper levels were very high: 530 and of 572.  Also very high at bedtime: 530 and 599.  -- With BG levels significantly elevated, we " "added first long-acting glargine (initially 10 units, increased to 16 units on ) and later, on , sliding scale aspart.  -- Sliding scale insulin introduced on , beginning at 200 -- adding 2 units every 50 -- up to 500.  -- A1c prior to admission was 7.2%, though I see 9.5% on 03/15/2023.    Chronic HFpEF  -- Receiving both furosemide and chlorthalidone.  Denies orthopnea.  Still, he likes the head of his bed up for comfort.    C. difficile colitis/abdominal discomfort  -- Testing positive on 2023.  -- As noted above, placed on extended course of vancomycin.  I believe he underwent treatment during his previous hospitalization.    -- Nursing staff reported complaints of nausea and stomachache on .  He did have a formed BM at that morning.  He does have a history of cholecystitis where symptoms were similar.  So we ordered abdominal US and ondansetron for the nausea.  Further, ordered that his metformin be given with food.   -- Abdominal x-rays were done instead of the ultrasound initially, and it showed \"nonspecific bowel gas pattern without obstruction; mild increase of feces;; no renal stones; mild gastric distention.\"  Ultrasound pending.  -- Abdominal pain/discomfort has resolved, and stools are formed.    Chronic kidney disease  -- Stage IIIa.  Care observed with nephrotoxic agents, diuretics.    Protein calorie malnutrition  -- Receiving dietary supplements.    Peripheral arterial disease  -- On clopidogrel and aspirin.    Medication adjustments  -- No heartburn, no nausea.  As is protocol with PPIs (especially in the elderly), we are going to taper pantoprazole from 40 mg daily to 20 mg daily.    Family  -- His wife  2 years ago.  They were  for 62 years.  It still affects him in some ways.      ROS:  10 point ROS of systems including Constitutional, Eyes, Respiratory, Cardiovascular, Gastroenterology, Genitourinary, Integumentary, Muscularskeletal, Psychiatric (denies any " depressive symptoms) were all negative except for pertinent positives noted in my HPI.      No past medical history on file.           No family history on file.     Social History     Socioeconomic History     Marital status:      Spouse name: None     Number of children: None     Years of education: None     Highest education level: None   Tobacco Use     Smoking status: Former     Packs/day: 1.00     Years: 45.00     Pack years: 45.00     Types: Cigarettes     Quit date: 1960     Years since quittin.1     Smokeless tobacco: Never   Substance and Sexual Activity     Alcohol use: Yes     Alcohol/week: 1.0 standard drink     Comment: Alcoholic Drinks/day: rare     Drug use: No       MEDICATIONS: Reviewed from the MAR, physician orders, and/or earlier progress notes.  MED REC REQUIRED  Post Medication Reconciliation Status: medication reconcilation previously completed during another office visit    Current Outpatient Medications   Medication Sig     pantoprazole (PROTONIX) 20 MG EC tablet Take 20 mg by mouth daily     acetaminophen (TYLENOL) 650 MG CR tablet Take 1,300 mg by mouth     aspirin 81 MG EC tablet [ASPIRIN 81 MG EC TABLET] Take 81 mg by mouth daily.     atorvastatin (LIPITOR) 40 MG tablet Take 40 mg by mouth     carvedilol (COREG) 25 MG tablet Take 25 mg by mouth     citalopram (CELEXA) 10 MG tablet Take 20 mg by mouth     clopidogrel (PLAVIX) 75 MG tablet Take 1 tablet by mouth every morning     furosemide (LASIX) 20 MG tablet Take 20 mg by mouth     gabapentin (NEURONTIN) 300 MG capsule Take 300 mg by mouth     hydrALAZINE (APRESOLINE) 25 MG tablet Take 75 mg by mouth 2 times daily     insulin aspart (NOVOLOG VIAL) 100 UNITS/ML vial Inject Subcutaneous 3 times daily (with meals) 200-250=2  251-300=4  301-350=6  351-400=8  401-450=10  450<= 12     insulin glargine (LANTUS PEN) 100 UNIT/ML pen Inject 16 Units Subcutaneous At Bedtime     isosorbide mononitrate (IMDUR) 30 MG 24 hr tablet  "Take 30 mg by mouth daily Give with 120 mg tab     isosorbide mononitrate CR (IMDUR) 120 MG 24 HR ER tablet Take 120 mg by mouth     metFORMIN (GLUCOPHAGE XR) 500 MG 24 hr tablet Take 1,000 mg by mouth daily (with dinner)     mirtazapine (REMERON) 7.5 MG tablet Take 7.5 mg by mouth At Bedtime     Multiple Vitamins-Minerals (SYSTANE ICAPS AREDS2) CAPS Take 1 capsule by mouth daily     nitroglycerin (NITROSTAT) 0.4 MG SL tablet [NITROGLYCERIN (NITROSTAT) 0.4 MG SL TABLET] Place 0.4 mg under the tongue as needed.     ondansetron (ZOFRAN) 4 MG tablet Take 1 tablet (4 mg) by mouth every 8 hours as needed for nausea or vomiting     pioglitazone (ACTOS) 45 MG tablet Take 30 mg by mouth     terazosin (HYTRIN) 10 MG capsule Take 1 capsule by mouth At Bedtime     vitamins  A,C,E-zinc-copper (ICAPS AREDS) 7,160-113-100 unit-mg-unit TbEC [VITAMINS  A,C,E-ZINC-COPPER (ICAPS AREDS) 7,160-113-100 UNIT-MG-UNIT TBEC] Take 1 capsule by mouth daily.     No current facility-administered medications for this visit.     ALLERGIES:   Allergies   Allergen Reactions     Acetaminophen Nausea and Vomiting     Other reaction(s): Nausea/vomiting     Amlodipine Unknown     Cilostazol Unknown     Hydrocodone      Other reaction(s): Nausea/vomiting     Vicodin [Hydrocodone-Acetaminophen] Unknown     DIET: Regular, regular texture, thin liquids.  Mighty Shake Plus nutritional supplement.    Vitals:    05/11/23 1358   BP: (!) 181/54   Pulse: 60   Resp: 18   Temp: 97.9  F (36.6  C)   SpO2: 96%   Weight: 64.6 kg (142 lb 8 oz)   Height: 1.676 m (5' 6\")     Wt Readings from Last 4 Encounters:   05/11/23 64.6 kg (142 lb 8 oz)   04/27/23 62.5 kg (137 lb 12.8 oz)   04/24/23 59.3 kg (130 lb 12.8 oz)   03/14/23 58.5 kg (129 lb)     Body mass index is 23 kg/m .  Body surface area is 1.73 meters squared.    EXAMINATION:   General: Pleasant, alert, and conversant elderly male, lying in bed, in no apparent distress.  Head: Normocephalic and atraumatic.   Eyes: " PERRLA, sclerae clear.  Bilateral ectropion.  ENT: Moist oral mucosa.  He has his own teeth.  No nasal discharge.  Hearing intact.  Cardiovascular: Regular rate and rhythm with a 3/6 systolic ejection murmur at the apex, unchanged from previous.  I could previously appreciate intermittent ectopy (every 5-6 beats).  Respiratory: Lungs CTAB.   Abdomen: Nondistended.   Musculoskeletal/Extremities: Age-related arthritic changes.  No peripheral edema.  Integument: No rashes, clinically significant lesions, or skin breakdown.   Cognitive/Psychiatric: Alert and oriented with euthymic affect.    DIAGNOSTICS:   No results found for this or any previous visit (from the past 240 hour(s)).   Last Comprehensive Metabolic Panel:  Sodium   Date Value Ref Range Status   03/01/2023 138 136 - 145 mmol/L Final     Potassium   Date Value Ref Range Status   03/01/2023 4.4 3.4 - 5.3 mmol/L Final   04/13/2022 4.9 3.5 - 5.0 mmol/L Final     Chloride   Date Value Ref Range Status   03/01/2023 101 98 - 107 mmol/L Final   04/13/2022 105 98 - 107 mmol/L Final     Carbon Dioxide (CO2)   Date Value Ref Range Status   03/01/2023 24 22 - 29 mmol/L Final   04/13/2022 23 22 - 31 mmol/L Final     Anion Gap   Date Value Ref Range Status   03/01/2023 13 7 - 15 mmol/L Final   04/13/2022 13 5 - 18 mmol/L Final     Glucose   Date Value Ref Range Status   03/01/2023 337 (H) 70 - 99 mg/dL Final   04/13/2022 194 (H) 70 - 125 mg/dL Final     Urea Nitrogen   Date Value Ref Range Status   03/01/2023 59.4 (H) 8.0 - 23.0 mg/dL Final   04/13/2022 68 (H) 8 - 28 mg/dL Final     Creatinine   Date Value Ref Range Status   03/01/2023 1.30 (H) 0.67 - 1.17 mg/dL Final     GFR Estimate   Date Value Ref Range Status   03/01/2023 52 (L) >60 mL/min/1.73m2 Final     Comment:     eGFR calculated using 2021 CKD-EPI equation.   06/02/2021 45 (L) >60 mL/min/1.73m2 Final     Calcium   Date Value Ref Range Status   03/01/2023 9.2 8.2 - 9.6 mg/dL Final     Bilirubin Total   Date  Value Ref Range Status   03/01/2023 0.3 <=1.2 mg/dL Final     Alkaline Phosphatase   Date Value Ref Range Status   03/01/2023 68 40 - 129 U/L Final     ALT   Date Value Ref Range Status   03/01/2023 17 10 - 50 U/L Final     AST   Date Value Ref Range Status   03/01/2023 30 10 - 50 U/L Final       Last CBC:  Lab Results   Component Value Date    WBC 6.7 02/06/2023     Lab Results   Component Value Date    RBC 3.05 02/06/2023     Lab Results   Component Value Date    HGB 10.2 02/06/2023     Lab Results   Component Value Date    HCT 31.7 02/06/2023     Lab Results   Component Value Date     02/06/2023     Lab Results   Component Value Date    MCH 33.4 02/06/2023     Lab Results   Component Value Date    MCHC 32.2 02/06/2023     Lab Results   Component Value Date    RDW 13.2 02/06/2023     Lab Results   Component Value Date     02/06/2023     Lab Results   Component Value Date    A1C 9.5 03/15/2023    A1C 7.5 12/26/2018    A1C 7.0 07/27/2018    A1C 6.9 01/26/2016    A1C 6.6 09/11/2014       ASSESSMENT/Plan:      ICD-10-CM    1. History of non-ST elevation myocardial infarction (NSTEMI)  I25.2       2. History of cholecystitis  Z87.19       3. Type 2 diabetes mellitus without complication, without long-term current use of insulin (H)  E11.9       4. C. difficile colitis  A04.72       5. SSS (sick sinus syndrome) (H)  I49.5       6. Cardiac pacemaker in situ  Z95.0       7. PAD (peripheral artery disease) (H)  I73.9       8. Chronic heart failure with preserved ejection fraction (HFpEF) (H)  I50.32       9. Thrombocytopenia (H)  D69.6       10. Macrocytic anemia  D53.9       11. Protein-calorie malnutrition, unspecified severity (H)  E46           CHANGES:  None.    CARE PLAN:  The care plan has been reviewed and all orders signed.  Changes to care plan, if any, as noted.  Otherwise, continue current plan of care.  Total time spent in this encounter was 47 minutes, involving both discussion with the patient  as well as research into the facility EMR..      The above has been created using voice recognition software. Please be aware that this may unintentionally  produce inaccuracies and/or nonsensical sentences.      Electronically signed by: PAULINA Manjarrez CNP        Sincerely,        PAULINA Manjarrez CNP

## 2023-05-13 NOTE — PROGRESS NOTES
[facility]    Name:   Barak Berry  :   1932  MRN:    [id number]     Facility[facility]   [facility] () [[id number]]   Room: [facility] / [place] [address]  Code Status: DNR and POLST AVAILABLE -     DOS: 2023    PCP:  Karel Petit MD     CHIEF COMPLAINT / REASON FOR VISIT:  Chief Complaint   Patient presents with     [facility]      [facility] from 2022 until 2023 (acute cholecystitis, NSTEMI)  [facility] from 2023 until 2023 (mechanical fall)  [facility]CU from 2023 until 2023 (moving into [facility])      HPI: Barak is a 89 year old male with a past medical history significant for diabetes mellitus type 2, essential hypertension, CKD stage III, CAD (s/p CABG , ), chronic HFpEF, sick sinus syndrome (s/p pacemaker), PAD, dyslipidemia, and GERD, who presented to the ED with nausea on 2022.  He had been in his normal state of health until that afternoon, will when he developed severe nausea and vomiting.  He subsequently developed left-sided chest pain, and heavy pressure in nature (no radiation to arm/jaw), and he was having low back pain as well.  Seen by cardiology, noting: NSTEMI with known residual CAD which would require complicated PCI.  TTE with EF 55%, no regional wall motion abnormalities, mild to moderate AV stenosis with mild AV regurgitation but no significant change compared to echo of 2022.  Given his history, cardiology opted for medical management, beginning with 48 hours of IV heparin.  He is to continue ASA, atorvastatin, carvedilol, clopidogrel, and isosorbide mononitrate.  He was also discharged on chlorthalidone.  Seen by general surgery re: Imaging concerning for cholecystitis.  Given that he is a very high surgical risk, there were no plans for surgical or IR intervention, and he had been doing well on antibiotic therapy.  Surgery recommended 14 days of antibiotic therapy and discharged on  "cefdinir and metronidazole.      He suffered a mechanical fall on 01/30/2023 and was admitted to [facility] after a fall at home.  He struggled to get up from the bathroom floor.  He described a feeling like his legs were giving out, and he did sustain a contusion to the left side of his head.  He had had multiple falls in the 2 weeks prior to his admission.  He reported falling 4 times before deciding he needed therapy.  EKG showed paced rhythm, BMP mild hyperglycemia and baseline creatinine of 1.48.  CBC unremarkable except for baseline hemoglobin 10.7 and known thrombocytopenia.  Troponin was elevated though downtrending.  He underwent a CT head revealing no acute intracranial hemorrhage or evidence of infarction.  Otherwise, typical age-related chronic small vessel disease and moderate generalized volume loss.  CT cervical spine without evidence of vertebral fracture or body height loss.  No canal stenosis.  The following day, per his earlier plans, he was admitted to this facility.      CURRENT/RECENT TCU ISSUES    Disposition  -- TODAY, of particular concern is his elevated blood glucose levels.  -- Son signed [facility] paperwork, starting 02/21.  He was not too happy about this.  He was walking, but unable to go home, he decided to stop walking.  He reported being depressed.  He tells me, \"I'd rather be home.  He had been living in a two-story house for 62 years.  -- He has felt weaker since his hospitalization in December.  He appreciated working with therapies to try and strengthen his legs.  As mentioned, this isn't something he had planned.  At this juncture, he will be resuming therapy on 05/12/2023 to address weakness and deconditioning.  -- Previously asked about any possible memory issues earlier, he responded, \"not major, some.\"  -- Knees tend to \"knot up\" when walking, but they eventually loosen up.  They bother him despite bilateral knee replacements.    Cognition  Depression  -- More confused than " "he often lets on.  He does appear alert and oriented today.  -- Scored 13/30 on the SLUMS, or requiring 24-hour supervision.  -- Incontinent of urine.  When asked if he needs to be changed when the incontinence briefs is soaked, he says he's fine.  In essence, he is unaware of his needs.  -- Denies any depression.  Thinks it's improved.  Likes the idea of having a shower in his room.  -- Citalopram decreased to 20 mg daily on 03/08.    Frequent falls  -- He says he fell 4 times following admission to the TCU, the last fall occurring on 02/10/2023.  I did mention this to the nurse manager.  No need to write an order.  The reason?  He uses the call light when he needs to urinate, but he ultimately cannot wait.  He sustained a small skin tear to the elbow.  Does have history of lumbar vertebral fracture.    Diabetes mellitus type 2  -- Significantly elevated blood glucose levels have been addressed this week with an increase in glargine and the addition of sliding scale aspart.  -- I noticed a container of cookies in his room, and he says he is eating no more than 1/day.  -- Blood glucose levels were initially \"way too high,\" according to the patient and his nurse.  They were running in the 200s and occasional 300s.  On 02/01, he was started on 500 mg of metformin XR daily.  I believe this had been stopped earlier as possible cause of diarrhea.  As he was able to tolerate that dose, it was increased to 1000 mg daily.  Also taking glipizide, and we may wish to take him off, depending on efficacy of metformin and pioglitazone.  -- Had been taken off of pioglitazone, and after reevaluation, we resumed dosing at 30 mg daily.  -- Most recently, fasting BG levels were: 339, 405, 219.  At lunch: 245.  Supper levels were very high: 530 and of 572.  Also very high at bedtime: 530 and 599.  -- With BG levels significantly elevated, we added first long-acting glargine (initially 10 units, increased to 16 units on 05/09) and " "later, on , sliding scale aspart.  -- Sliding scale insulin introduced on , beginning at 200 -- adding 2 units every 50 -- up to 500.  -- A1c prior to admission was 7.2%, though I see 9.5% on 03/15/2023.    Chronic HFpEF  -- Receiving both furosemide and chlorthalidone.  Denies orthopnea.  Still, he likes the head of his bed up for comfort.    C. difficile colitis/abdominal discomfort  -- Testing positive on 2023.  -- As noted above, placed on extended course of vancomycin.  I believe he underwent treatment during his previous hospitalization.    -- Nursing staff reported complaints of nausea and stomachache on .  He did have a formed BM at that morning.  He does have a history of cholecystitis where symptoms were similar.  So we ordered abdominal US and ondansetron for the nausea.  Further, ordered that his metformin be given with food.   -- Abdominal x-rays were done instead of the ultrasound initially, and it showed \"nonspecific bowel gas pattern without obstruction; mild increase of feces;; no renal stones; mild gastric distention.\"  Ultrasound pending.  -- Abdominal pain/discomfort has resolved, and stools are formed.    Chronic kidney disease  -- Stage IIIa.  Care observed with nephrotoxic agents, diuretics.    Protein calorie malnutrition  -- Receiving dietary supplements.    Peripheral arterial disease  -- On clopidogrel and aspirin.    Medication adjustments  -- No heartburn, no nausea.  As is protocol with PPIs (especially in the elderly), we are going to taper pantoprazole from 40 mg daily to 20 mg daily.    Family  -- His wife  2 years ago.  They were  for 62 years.  It still affects him in some ways.      ROS:  10 point ROS of systems including Constitutional, Eyes, Respiratory, Cardiovascular, Gastroenterology, Genitourinary, Integumentary, Muscularskeletal, Psychiatric (denies any depressive symptoms) were all negative except for pertinent positives noted in my " HPI.      No past medical history on file.           No family history on file.     Social History     Socioeconomic History     Marital status:      Spouse name: None     Number of children: None     Years of education: None     Highest education level: None   Tobacco Use     Smoking status: Former     Packs/day: 1.00     Years: 45.00     Pack years: 45.00     Types: Cigarettes     Quit date: 1960     Years since quittin.1     Smokeless tobacco: Never   Substance and Sexual Activity     Alcohol use: Yes     Alcohol/week: 1.0 standard drink     Comment: Alcoholic Drinks/day: rare     Drug use: No       MEDICATIONS: Reviewed from the MAR, physician orders, and/or earlier progress notes.  MED REC REQUIRED  Post Medication Reconciliation Status: medication reconcilation previously completed during another office visit    Current Outpatient Medications   Medication Sig     pantoprazole (PROTONIX) 20 MG EC tablet Take 20 mg by mouth daily     acetaminophen (TYLENOL) 650 MG CR tablet Take 1,300 mg by mouth     aspirin 81 MG EC tablet [ASPIRIN 81 MG EC TABLET] Take 81 mg by mouth daily.     atorvastatin (LIPITOR) 40 MG tablet Take 40 mg by mouth     carvedilol (COREG) 25 MG tablet Take 25 mg by mouth     citalopram (CELEXA) 10 MG tablet Take 20 mg by mouth     clopidogrel (PLAVIX) 75 MG tablet Take 1 tablet by mouth every morning     furosemide (LASIX) 20 MG tablet Take 20 mg by mouth     gabapentin (NEURONTIN) 300 MG capsule Take 300 mg by mouth     hydrALAZINE (APRESOLINE) 25 MG tablet Take 75 mg by mouth 2 times daily     insulin aspart (NOVOLOG VIAL) 100 UNITS/ML vial Inject Subcutaneous 3 times daily (with meals) 200-250=2  251-300=4  301-350=6  351-400=8  401-450=10  450<= 12     insulin glargine (LANTUS PEN) 100 UNIT/ML pen Inject 16 Units Subcutaneous At Bedtime     isosorbide mononitrate (IMDUR) 30 MG 24 hr tablet Take 30 mg by mouth daily Give with 120 mg tab     isosorbide mononitrate CR  "(IMDUR) 120 MG 24 HR ER tablet Take 120 mg by mouth     metFORMIN (GLUCOPHAGE XR) 500 MG 24 hr tablet Take 1,000 mg by mouth daily (with dinner)     mirtazapine (REMERON) 7.5 MG tablet Take 7.5 mg by mouth At Bedtime     Multiple Vitamins-Minerals (SYSTANE ICAPS AREDS2) CAPS Take 1 capsule by mouth daily     nitroglycerin (NITROSTAT) 0.4 MG SL tablet [NITROGLYCERIN (NITROSTAT) 0.4 MG SL TABLET] Place 0.4 mg under the tongue as needed.     ondansetron (ZOFRAN) 4 MG tablet Take 1 tablet (4 mg) by mouth every 8 hours as needed for nausea or vomiting     pioglitazone (ACTOS) 45 MG tablet Take 30 mg by mouth     terazosin (HYTRIN) 10 MG capsule Take 1 capsule by mouth At Bedtime     vitamins  A,C,E-zinc-copper (ICAPS AREDS) 7,160-113-100 unit-mg-unit TbEC [VITAMINS  A,C,E-ZINC-COPPER (ICAPS AREDS) 7,160-113-100 UNIT-MG-UNIT TBEC] Take 1 capsule by mouth daily.     No current facility-administered medications for this visit.     ALLERGIES:   Allergies   Allergen Reactions     Acetaminophen Nausea and Vomiting     Other reaction(s): Nausea/vomiting     Amlodipine Unknown     Cilostazol Unknown     Hydrocodone      Other reaction(s): Nausea/vomiting     Vicodin [Hydrocodone-Acetaminophen] Unknown     DIET: Regular, regular texture, thin liquids.  Mighty Shake Plus nutritional supplement.    Vitals:    05/11/23 1358   BP: (!) 181/54   Pulse: 60   Resp: 18   Temp: 97.9  F (36.6  C)   SpO2: 96%   Weight: 64.6 kg (142 lb 8 oz)   Height: 1.676 m (5' 6\")     Wt Readings from Last 4 Encounters:   05/11/23 64.6 kg (142 lb 8 oz)   04/27/23 62.5 kg (137 lb 12.8 oz)   04/24/23 59.3 kg (130 lb 12.8 oz)   03/14/23 58.5 kg (129 lb)     Body mass index is 23 kg/m .  Body surface area is 1.73 meters squared.    EXAMINATION:   General: Pleasant, alert, and conversant elderly male, lying in bed, in no apparent distress.  Head: Normocephalic and atraumatic.   Eyes: PERRLA, sclerae clear.  Bilateral ectropion.  ENT: Moist oral mucosa.  He has " his own teeth.  No nasal discharge.  Hearing intact.  Cardiovascular: Regular rate and rhythm with a 3/6 systolic ejection murmur at the apex, unchanged from previous.  I could previously appreciate intermittent ectopy (every 5-6 beats).  Respiratory: Lungs CTAB.   Abdomen: Nondistended.   Musculoskeletal/Extremities: Age-related arthritic changes.  No peripheral edema.  Integument: No rashes, clinically significant lesions, or skin breakdown.   Cognitive/Psychiatric: Alert and oriented with euthymic affect.    DIAGNOSTICS:   No results found for this or any previous visit (from the past 240 hour(s)).   Last Comprehensive Metabolic Panel:  Sodium   Date Value Ref Range Status   03/01/2023 138 136 - 145 mmol/L Final     Potassium   Date Value Ref Range Status   03/01/2023 4.4 3.4 - 5.3 mmol/L Final   04/13/2022 4.9 3.5 - 5.0 mmol/L Final     Chloride   Date Value Ref Range Status   03/01/2023 101 98 - 107 mmol/L Final   04/13/2022 105 98 - 107 mmol/L Final     Carbon Dioxide (CO2)   Date Value Ref Range Status   03/01/2023 24 22 - 29 mmol/L Final   04/13/2022 23 22 - 31 mmol/L Final     Anion Gap   Date Value Ref Range Status   03/01/2023 13 7 - 15 mmol/L Final   04/13/2022 13 5 - 18 mmol/L Final     Glucose   Date Value Ref Range Status   03/01/2023 337 (H) 70 - 99 mg/dL Final   04/13/2022 194 (H) 70 - 125 mg/dL Final     Urea Nitrogen   Date Value Ref Range Status   03/01/2023 59.4 (H) 8.0 - 23.0 mg/dL Final   04/13/2022 68 (H) 8 - 28 mg/dL Final     Creatinine   Date Value Ref Range Status   03/01/2023 1.30 (H) 0.67 - 1.17 mg/dL Final     GFR Estimate   Date Value Ref Range Status   03/01/2023 52 (L) >60 mL/min/1.73m2 Final     Comment:     eGFR calculated using 2021 CKD-EPI equation.   06/02/2021 45 (L) >60 mL/min/1.73m2 Final     Calcium   Date Value Ref Range Status   03/01/2023 9.2 8.2 - 9.6 mg/dL Final     Bilirubin Total   Date Value Ref Range Status   03/01/2023 0.3 <=1.2 mg/dL Final     Alkaline  Phosphatase   Date Value Ref Range Status   03/01/2023 68 40 - 129 U/L Final     ALT   Date Value Ref Range Status   03/01/2023 17 10 - 50 U/L Final     AST   Date Value Ref Range Status   03/01/2023 30 10 - 50 U/L Final       Last CBC:  Lab Results   Component Value Date    WBC 6.7 02/06/2023     Lab Results   Component Value Date    RBC 3.05 02/06/2023     Lab Results   Component Value Date    HGB 10.2 02/06/2023     Lab Results   Component Value Date    HCT 31.7 02/06/2023     Lab Results   Component Value Date     02/06/2023     Lab Results   Component Value Date    MCH 33.4 02/06/2023     Lab Results   Component Value Date    MCHC 32.2 02/06/2023     Lab Results   Component Value Date    RDW 13.2 02/06/2023     Lab Results   Component Value Date     02/06/2023     Lab Results   Component Value Date    A1C 9.5 03/15/2023    A1C 7.5 12/26/2018    A1C 7.0 07/27/2018    A1C 6.9 01/26/2016    A1C 6.6 09/11/2014       ASSESSMENT/Plan:      ICD-10-CM    1. History of non-ST elevation myocardial infarction (NSTEMI)  I25.2       2. History of cholecystitis  Z87.19       3. Type 2 diabetes mellitus without complication, without long-term current use of insulin (H)  E11.9       4. C. difficile colitis  A04.72       5. SSS (sick sinus syndrome) (H)  I49.5       6. Cardiac pacemaker in situ  Z95.0       7. PAD (peripheral artery disease) (H)  I73.9       8. Chronic heart failure with preserved ejection fraction (HFpEF) (H)  I50.32       9. Thrombocytopenia (H)  D69.6       10. Macrocytic anemia  D53.9       11. Protein-calorie malnutrition, unspecified severity (H)  E46           CHANGES:  None.    CARE PLAN:  The care plan has been reviewed and all orders signed.  Changes to care plan, if any, as noted.  Otherwise, continue current plan of care.  Total time spent in this encounter was 47 minutes, involving both discussion with the patient as well as research into the facility EMR..      The above has been  created using voice recognition software. Please be aware that this may unintentionally  produce inaccuracies and/or nonsensical sentences.      Electronically signed by: PAULINA Manjarrez CNP

## 2023-05-17 NOTE — TELEPHONE ENCOUNTER
[facility] Triage Nurse Telephone Encounter    Provider: CELESTE Perrin  Facility: [facility] Facility Type:  LT    Caller: Samuel  Call Back Number: [phone]    Allergies:    Allergies   Allergen Reactions     Acetaminophen Nausea and Vomiting     Other reaction(s): Nausea/vomiting     Amlodipine Unknown     Cilostazol Unknown     Hydrocodone      Other reaction(s): Nausea/vomiting     Vicodin [Hydrocodone-Acetaminophen] Unknown        Reason for call: Nurse called to report that patient had a fall last evening around 2100.  Patient is complaining of 10/10 pain in his lower back and refusing to get out of bed.  At the time of the fall patient was on the floor between his door frame and bed.  Patient was walking to the bathroom with his cane in the dark and fell.  Patient also sustained a skin tear to his left arm.  Patient receives Tylenol Arthritis 1300 mg po BID and is not relieving the pain.  Patient wants an x-ray and pain medications STAT.       Verbal Order/Direction given by Provider: Send to ER for eval.    Provider giving Order:  Rosenstein, Benjamin MD    Verbal Order given to: Samuel Cortes RN

## 2023-06-05 NOTE — Clinical Note
For this patient, he is on the older imdur/hydralazine combo. I have some concern that hydral is possibly leading to falls with more significant BP drops. I'd favor transitioning to an ACE/ARB (already on carvedilol as a BB) and then amlodipine if needed too. Other thoughts? -[place]

## 2023-06-05 NOTE — PROGRESS NOTES
[facility] GERIATRICS    PRIMARY CARE PROVIDER AND CLINIC:  PAULINA Manjarrez CNP, [address] / [place] [address]  Chief Complaint   Patient presents with     Hospital F/U      [facility] Record Number:  [id number]  Place of Service where encounter took place:  [facility] () [[id number]]    Barak Berry  is a 89 year old former ,   (1932), with pmhx including recent NSTEMI, prior CABG (2x), HFpEF, PAD, T2DM, falls  returned to the above facility from  [facility]. Hospital stay 23 - 23.      Known to me from prior TCU and B&C stays.    HPI:      Seen today in follow-up after hospitalization related to recent fall.  He recalls his admission pretty well.  He did states he was getting up to go to the bathroom.  He usually ambulates with relates with a walker, however at this occasion he used a cane occasion used a cane instead.  Unfortunately due to due to significant weakness, his legs gave out from under him and he fell down onto him onto his buttocks.  No head injury.  The next day and working with physical therapy, he had severe back pain.  Because of this, recommendation was to send to the emergency room. Notably, he fell on 5/10/2023 as well.    In the ED, BMP normal with baseline creatinine, did have hyperglycemia at 259.  CBC with no acute changes.  CT of the head with no acute findings.  Findings consistent with chronic small vessel disease.  CT of the spine however showed new moderate L2 inferior plate compression fracture with retropulsion with mild spinal canal stenosis.  Also with new acute or subacute L2 transverse process fracture.  Noted to have chronic moderate L1 compression fracture with retropulsion and chronic left 11th rib fracture.  He was seen by the neurosurgical spine service with recommendation for bracing when up.  He pain was controlled with low amount of oral hydromorphone which she was  discharged with.  Also with bowel regimen for this.  Therapies ordered when he returned to LTC.    Today  Today he reports still being pretty sore in his back.  He has been able to walk around the unit with a therapist and is using his walker again.  His legs continue to be weak but not worse than usual.    He has not been getting up to the bathroom more often than usual at night.  He not significantly thirsty.  He says it was usual needs to go to the bathroom.  Denies feeling lightheaded or dizziness when he stands.  Appetite is okay.  He continues to lose weight.    What matters most to him is taking care of his pain.  He does not want to continue to be in pain.  Being with his kids is most important as well.    CODE STATUS/ADVANCE DIRECTIVES DISCUSSION:  No Order  DNR / DNI  ALLERGIES:   Allergies   Allergen Reactions     Acetaminophen Nausea and Vomiting     Other reaction(s): Nausea/vomiting     Amlodipine Unknown     Cilostazol Unknown     Hydrocodone      Other reaction(s): Nausea/vomiting     Vicodin [Hydrocodone-Acetaminophen] Unknown      PAST MEDICAL HISTORY: No past medical history on file.   PAST SURGICAL HISTORY:   has a past surgical history that includes CABG, VEIN, SINGLE; CORONARY STENT PERCUT, INITIAL VESSEL; appendectomy; Arthroscopy knee (Left); Total Knee Arthroplasty (Left); Lumbar Laminectomy; Tonsillectomy; joint replacement; Cardiac surgery; and Colonoscopy w/wo Brush **Performed** (N/A, 1/3/2019).  FAMILY HISTORY: family history is not on file.  SOCIAL HISTORY:   reports that he quit smoking about 62 years ago. He has a 45.00 pack-year smoking history. He has never used smokeless tobacco. He reports current alcohol use of about 1.0 standard drink of alcohol per week. He reports that he does not use drugs.  Patient's living condition: lives in a nursing home    Post Discharge Medication Reconciliation Status:   MED REC REQUIRED  Post Medication Reconciliation Status: discharge medications  reconciled and changed, per note/orders       Current Outpatient Medications   Medication Sig     acetaminophen (TYLENOL) 500 MG tablet Take 1,000 mg by mouth 3 times daily AND 1 TAB PO Q4H PRN     aspirin 81 MG EC tablet [ASPIRIN 81 MG EC TABLET] Take 81 mg by mouth daily.     atorvastatin (LIPITOR) 40 MG tablet Take 40 mg by mouth     carvedilol (COREG) 25 MG tablet Take 25 mg by mouth     citalopram (CELEXA) 10 MG tablet Take 20 mg by mouth     clopidogrel (PLAVIX) 75 MG tablet Take 1 tablet by mouth every morning     furosemide (LASIX) 20 MG tablet Take 20 mg by mouth     hydrALAZINE (APRESOLINE) 25 MG tablet Take 75 mg by mouth 2 times daily     HYDROmorphone (DILAUDID) 2 MG tablet Take 1 tablet (2 mg) by mouth every 8 hours as needed for pain or severe pain     insulin aspart (NOVOLOG VIAL) 100 UNITS/ML vial Inject Subcutaneous 3 times daily (with meals) Inject as per sliding scale: if 200 - 250 = 2 units ; 251 - 300  = 4 units; 301 - 350 = 6 units; 351 - 400 = 8 units; 401  - 450 = 10 units; 451+ = 12 units     insulin glargine (LANTUS PEN) 100 UNIT/ML pen Inject 20 Units Subcutaneous At Bedtime     isosorbide mononitrate (IMDUR) 30 MG 24 hr tablet Take 30 mg by mouth daily Give with 120 mg tab     isosorbide mononitrate CR (IMDUR) 120 MG 24 HR ER tablet Take 120 mg by mouth     metFORMIN (GLUCOPHAGE XR) 500 MG 24 hr tablet Take 1,000 mg by mouth daily (with dinner)     mirtazapine (REMERON) 7.5 MG tablet Take 7.5 mg by mouth At Bedtime     Multiple Vitamins-Minerals (SYSTANE ICAPS AREDS2) CAPS Take 1 capsule by mouth daily     nitroglycerin (NITROSTAT) 0.4 MG SL tablet [NITROGLYCERIN (NITROSTAT) 0.4 MG SL TABLET] Place 0.4 mg under the tongue as needed.     ondansetron (ZOFRAN) 8 MG tablet Take 8 mg by mouth every 8 hours as needed for nausea     pantoprazole (PROTONIX) 40 MG EC tablet Take 40 mg by mouth daily     pioglitazone (ACTOS) 30 MG tablet Take 30 mg by mouth     polyethylene glycol (MIRALAX) 17 g  "packet Take 17 g by mouth daily as needed for constipation     senna-docusate (SENOKOT-S/PERICOLACE) 8.6-50 MG tablet Take 2 tablets by mouth 2 times daily     terazosin (HYTRIN) 10 MG capsule Take 1 capsule by mouth At Bedtime     vitamin D2 (ERGOCALCIFEROL) 13325 units (1250 mcg) capsule Take 50,000 Units by mouth once a week     acetaminophen (TYLENOL) 650 MG CR tablet Take 1,300 mg by mouth     ondansetron (ZOFRAN) 4 MG tablet Take 1 tablet (4 mg) by mouth every 8 hours as needed for nausea or vomiting     vitamins  A,C,E-zinc-copper (ICAPS AREDS) 7,160-113-100 unit-mg-unit TbEC [VITAMINS  A,C,E-ZINC-COPPER (ICAPS AREDS) 7,160-113-100 UNIT-MG-UNIT TBEC] Take 1 capsule by mouth daily.     No current facility-administered medications for this visit.       Vitals:  BP (!) 176/57   Pulse 64   Temp 98.2  F (36.8  C)   Resp 20   Ht 1.676 m (5' 6\")   Wt 60.7 kg (133 lb 12.8 oz)   SpO2 94%   BMI 21.60 kg/m    Exam:    GENERAL APPEARANCE: Laying in bed comfortably, NAD  HENT:  NCAT, moderately [place]  EYES:  Conjunctiva clear, anicteric, EOMI, wears glasses  PULM  Normal WOB on RA, lungs CTAB, no wheezes or crackles  CV:  RRR, S1/S2 normal, no murmurs; trace LE edema  ABDOMEN: Abdomen soft, not tender, not distended, BS normal and active throughout   SKIN:  No significant eccyhmoses  NEURO: Alert, disoriented to date, normal thought processes; CN II-XII grossly intact  PSYCH:  Mood decreased with congruent affect    Lab/Diagnostic data:  Recent labs/imagin in [facility] reviewed by me today.     EXAM: CT SPINE LUMBAR WO   LOCATION: [facility] MEDICAL IMAGING   DATE/TIME: 5/17/2023 11:20 AM CDT     INDICATION: Lumbar spine trauma, pain.   COMPARISON: CT abdomen and pelvis 12/30/2022. Lumbar spine MRI 02/06/2020.   TECHNIQUE: Routine CT Lumbar Spine without IV contrast. Multiplanar reformats. Dose reduction techniques were used.     FINDINGS:   VERTEBRA: Five lumbar type vertebrae counting down from the presumed 12th ribs. "     Stable alignment of chronic L1 compression fracture with approximately 30-40% loss of vertebral body height and 4 mm retropulsion of fracture fragments contributing to mild spinal canal stenosis.     New L2 inferior endplate compression fracture with approximately 20-30% loss of vertebral body height and 3 mm retropulsion of inferior endplate fracture fragments contributing to mild spinal canal stenosis. There is a visible fracture line and mild perivertebral soft tissue edema, consistent with recent acute or subacute fracture. No destructive bone lesion or evidence of associated soft tissue mass. No fracture extension through the posterior elements.     New acute or subacute left L2 transverse process fracture.     Normal lumbar lordosis. Multilevel lumbar spondylosis with loss of disc height, disc bulge formation, facet arthrosis and ligamentum flavum thickening.     CANAL/FORAMINA: Moderate neural foraminal stenosis bilaterally L1-L3 and on the left at L5-S1. Mild to moderate neural foraminal stenosis bilaterally L3-L5 and on the right at L5-S1. Severe spinal canal stenosis at L4-L5. Moderate spinal canal stenosis L2-L4. Mild spinal canal stenosis at L1-L2 and L5-S1.     PARASPINAL: Chronic left posterior 11th rib fracture. Mild to moderate degenerative changes of the sacroiliac joints. Mild symmetric lower lumbar predominant dorsal paraspinous muscular atrophy. Mild infrarenal abdominal aortic ectasia.     IMPRESSION:   1. New recent (acute or subacute) mild to moderate L2 inferior plate compression fracture with retropulsed fracture fragments contributing to mild spinal canal stenosis.   2. New recent (acute or subacute) left L2 transverse process fracture.   3. Stable alignment of chronic moderate L1 compression fracture with retropulsed fracture fragments contributing to mild spinal canal stenosis.   3. Chronic left posterior 11th rib fracture.   4. Multilevel lumbar spondylosis with severe spinal canal  stenosis at L4-L5.     ASSESSMENT/PLAN:    (W19.XXXD) Fall, subsequent encounter  (primary encounter diagnosis)  Comment: Likely multifactorial related to chronic deconditioning, mild cognitive impairment, vision and hearing impairments, possibly hyperglycemia, and also concern for orthostatic hypotension particular with hydralazine.  Plan:   -Consider transition from hydralazine to ACE or ARB, as well as addition of amlodipine if needed  -Therapies as ordered from hospital    (M80.00XD) Age-related osteoporosis with current pathological fracture with routine healing, subsequent (M80.08XD) Age-related osteoporosis with current pathological fracture, vertebra(e), subsequent encounter for fracture with routine healing  Comment: Osteoporosis consistent with L2 fracture.  His goals are to treat and manage pain.  Continue low-dose hydromorphone at this time.  Decrease frequency as he is not using it very frequently.  Will evaluate for for treatment of osteoporosis with baseline DEXA scan and labs as treatments may prevent the future fracture and associated pain, fitting with his values.  Plan:   -DX Hip/Pelvis/Spine --> we will try to schedule 6/16/2023 when he will also be out for neurosurgery follow-up appointment  -CMP, Vit D, PTH         (E44.0) Moderate protein-calorie malnutrition (H)  (R63.4) Weight loss  Comment: Likely contributing to the above with poor intake and likely poor intake of calcium.  Additionally additionally may have weight loss related to uncontrolled diabetes.  Plan:   -Continue supportive oral intake as able  -Management of diabetes as below    (R53.81) Physical deconditioning  Comment: Given other findings, overall consistent with frailty.  Continue with therapies as above.    (R45.89) Depressed mood  Comment: Continues to endorse feeling down at times.  Generally low interest in activities.  Even difficult for him to identify things that are most important to him initially.  Plan:   -Continue  mirtazapine, citalopram    (E11.65) Type 2 diabetes mellitus with hyperglycemia, without long-term current use of insulin (H)  Comment: Continues to have significant hyperglycemia with blood sugars 300s to 500s.  Continues on metformin 1000 mg daily and pioglitazone 30 mg daily.  Additionally Lantus 16 units and SSI.  Plan:   -Increase Lantus to 20 units nightly  -Monitor for approximately 1 week, make like likely to schedule mealtime insulin as well of approximately 5 to 8 units given recent needs.  -Consider discontinuing metformin in case GI disturbances contributing to malnutrition  -Would avoid SGLT2i or GLP-1 to avoid further weight loss    (Z79.899) Polypharmacy  Comment: On many medications, some of which may be contributing to falls, malnutrition, and frailty.  Multiple changes being considered as noted above.    Orders:  [x] Decrease dilaudid to q8h  [x] Increase lantus to 20 units at bedtime from 16  [x] Discontinue gabapentin (not used, no need)  [x] CMP, PTH, Vit D  [x] DEXA ordered, will try to schedule 6/16/23 when he will be out for an appointment    Electronically signed by:    Benjamin Rosenstein, MD, MA  [facility] Faculty    This note was completed with the assistance of dictation software. Typos and word substitution-errors are expected and unintended.      35 MINUTES SPENT BY ME on the date of service doing chart review, history, exam, documentation & further activities per the note.

## 2023-06-08 NOTE — TELEPHONE ENCOUNTER
[facility] Geriatrics Lab Note     Provider: Rosenstein, Benjamin MD  Facility: [facility] Facility Type:  [facility]    Allergies   Allergen Reactions     Acetaminophen Nausea and Vomiting     Other reaction(s): Nausea/vomiting     Amlodipine Unknown     Cilostazol Unknown     Hydrocodone      Other reaction(s): Nausea/vomiting     Vicodin [Hydrocodone-Acetaminophen] Unknown       Labs Reviewed by provider: CMP, Vitamin D, PTH     Verbal Order/Direction given by Provider: NNO    Provider giving Order:  Rosenstein, Benjamin MD    Verbal Order given to: Gustavo Cortes RN

## 2023-06-12 PROBLEM — R79.89 ELEVATED TROPONIN: Status: ACTIVE | Noted: 2023-01-01

## 2023-06-12 PROBLEM — E16.2 HYPOGLYCEMIA: Status: ACTIVE | Noted: 2023-01-01

## 2023-06-12 NOTE — PHARMACY-ADMISSION MEDICATION HISTORY
Pharmacist Admission Medication History    Admission medication history is complete. The information provided in this note is only as accurate as the sources available at the time of the update.    Medication reconciliation/reorder completed by provider prior to medication history? No    Information Source(s): Facility ([facility]/) medication list/MAR via records and phone with [facility] RN manager Candel[place]    Pertinent Information:  Most recent insulin administrations:  Lantus 20 units given 6/11 @ 1959  Sliding scale: 6/11 lunchtime blood glucose was 407 so patient received 10 units, 6/11 dinnertime blood glucose was 174 so no sliding scale insulin given  A medication list was provided by the facility, key administration times for insulins taken via phone call, [facility] ED fax number provided so a more detailed MAR can be sent for exact admin times    Changes made to PTA medication list:    Added: None    Deleted: None    Changed: added missing frequencies     Allergies reviewed with patient and updates made in EHR: medication allergies in EPIC match the records from the [facility]    Medication History Completed By: Lorelei Hidalgo RPH 6/12/2023 9:14 AM    Prior to Admission medications    Medication Sig Last Dose Taking? Auth Provider Long Term End Date   acetaminophen (TYLENOL) 500 MG tablet Take 1,000 mg by mouth 3 times daily 6/11/2023 at pm Yes Unknown, Entered By History     acetaminophen (TYLENOL) 500 MG tablet Take 500 mg by mouth every 4 hours as needed AND 1 TAB PO Q4H PRN Past Week Yes Reported, Patient     aspirin 81 MG EC tablet [ASPIRIN 81 MG EC TABLET] Take 81 mg by mouth daily. 6/11/2023 at am Yes Provider, Historical     atorvastatin (LIPITOR) 40 MG tablet Take 40 mg by mouth every evening 6/11/2023 at pm Yes Reported, Patient Yes    carvedilol (COREG) 25 MG tablet Take 25 mg by mouth 2 times daily 6/11/2023 at pm Yes Reported, Patient Yes    citalopram (CELEXA) 10 MG tablet Take 20 mg by  mouth daily 6/11/2023 at am Yes Reported, Patient Yes    clopidogrel (PLAVIX) 75 MG tablet Take 1 tablet by mouth every morning 6/11/2023 at am Yes Reported, Patient     furosemide (LASIX) 20 MG tablet Take 20 mg by mouth 6/11/2023 at am Yes Reported, Patient Yes    hydrALAZINE (APRESOLINE) 25 MG tablet Take 75 mg by mouth 2 times daily 6/11/2023 at pm Yes Provider, Historical Yes    HYDROmorphone (DILAUDID) 2 MG tablet Take 1 tablet (2 mg) by mouth every 8 hours as needed for pain or severe pain Past Week Yes Rosenstein, Benjamin, MD     insulin aspart (NOVOLOG VIAL) 100 UNITS/ML vial Inject Subcutaneous 3 times daily (with meals) Inject as per sliding scale: if 200 - 250 = 2 units ; 251 - 300  = 4 units; 301 - 350 = 6 units; 351 - 400 = 8 units; 401  - 450 = 10 units; 451+ = 12 units 6/11/2023 at dinner Yes Reported, Patient No    insulin glargine (LANTUS PEN) 100 UNIT/ML pen Inject 20 Units Subcutaneous At Bedtime 6/11/2023 at 1959 Yes Reported, Patient Yes    isosorbide mononitrate (IMDUR) 30 MG 24 hr tablet Take 30 mg by mouth daily Give with 120 mg tab 6/11/2023 at am Yes Provider, Historical Yes    isosorbide mononitrate CR (IMDUR) 120 MG 24 HR ER tablet Take 120 mg by mouth 6/11/2023 at am Yes Reported, Patient Yes    metFORMIN (GLUCOPHAGE XR) 500 MG 24 hr tablet Take 1,000 mg by mouth daily (with dinner) 6/11/2023 at dinner Yes Burke Gamino, PAULINA CNP Yes    mirtazapine (REMERON) 7.5 MG tablet Take 7.5 mg by mouth At Bedtime 6/11/2023 at pm Yes Reported, Patient Yes    nitroglycerin (NITROSTAT) 0.4 MG SL tablet [NITROGLYCERIN (NITROSTAT) 0.4 MG SL TABLET] Place 0.4 mg under the tongue as needed. Unknown Yes Provider, Historical Yes    ondansetron (ZOFRAN) 8 MG tablet Take 8 mg by mouth every 8 hours as needed for nausea Unknown Yes Reported, Patient     pantoprazole (PROTONIX) 40 MG EC tablet Take 40 mg by mouth daily 6/11/2023 at am Yes Burke Gamino APRN CNP     pioglitazone (ACTOS)  30 MG tablet Take 30 mg by mouth daily 6/11/2023 at am Yes Reported, Patient No    polyethylene glycol (MIRALAX) 17 g packet Take 17 g by mouth daily as needed for constipation Unknown Yes Reported, Patient     senna-docusate (SENOKOT-S/PERICOLACE) 8.6-50 MG tablet Take 2 tablets by mouth 2 times daily 6/11/2023 at pm Yes Reported, Patient     terazosin (HYTRIN) 10 MG capsule Take 1 capsule by mouth At Bedtime 6/11/2023 at pm Yes Reported, Patient Yes    vitamins  A,C,E-zinc-copper (ICAPS AREDS) 7,160-113-100 unit-mg-unit TbEC [VITAMINS  A,C,E-ZINC-COPPER (ICAPS AREDS) 7,160-113-100 UNIT-MG-UNIT TBEC] Take 1 capsule by mouth daily. 6/11/2023 at am Yes Provider, Historical       ------------------------------------    Addendum 6/12 @ 1030  MAR fax arrived.  Times updated as below        Prior to Admission medications    Medication Sig Last Dose Taking? Auth Provider Long Term End Date   acetaminophen (TYLENOL) 500 MG tablet Take 1,000 mg by mouth 3 times daily 6/11/2023 at 1959 Yes Unknown, Entered By History     acetaminophen (TYLENOL) 500 MG tablet Take 500 mg by mouth every 4 hours as needed AND 1 TAB PO Q4H PRN Past Week Yes Reported, Patient     aspirin 81 MG EC tablet [ASPIRIN 81 MG EC TABLET] Take 81 mg by mouth daily. 6/11/2023 at 0706 Yes Provider, Historical     atorvastatin (LIPITOR) 40 MG tablet Take 40 mg by mouth every evening 6/11/2023 at 1959 Yes Reported, Patient Yes    carvedilol (COREG) 25 MG tablet Take 25 mg by mouth 2 times daily 6/11/2023 at 0708 Yes Reported, Patient Yes    citalopram (CELEXA) 10 MG tablet Take 20 mg by mouth daily 6/11/2023 at 0706 Yes Reported, Patient Yes    clopidogrel (PLAVIX) 75 MG tablet Take 1 tablet by mouth every morning 6/11/2023 at 0706 Yes Reported, Patient     furosemide (LASIX) 20 MG tablet Take 20 mg by mouth 6/11/2023 at 0706 Yes Reported, Patient Yes    hydrALAZINE (APRESOLINE) 25 MG tablet Take 75 mg by mouth 2 times daily 6/11/2023 at 1959 Yes Provider,  Historical Yes    HYDROmorphone (DILAUDID) 2 MG tablet Take 1 tablet (2 mg) by mouth every 8 hours as needed for pain or severe pain Past Week Yes Rosenstein, Benjamin, MD     insulin aspart (NOVOLOG VIAL) 100 UNITS/ML vial Inject Subcutaneous 3 times daily (with meals) Inject as per sliding scale: if 200 - 250 = 2 units ; 251 - 300  = 4 units; 301 - 350 = 6 units; 351 - 400 = 8 units; 401  - 450 = 10 units; 451+ = 12 units 6/11/2023 at dinner Yes Reported, Patient No    insulin glargine (LANTUS PEN) 100 UNIT/ML pen Inject 20 Units Subcutaneous At Bedtime 6/11/2023 at 1959 Yes Reported, Patient Yes    isosorbide mononitrate (IMDUR) 30 MG 24 hr tablet Take 30 mg by mouth daily Give with 120 mg tab 6/11/2023 at 0706 Yes Provider, Historical Yes    isosorbide mononitrate CR (IMDUR) 120 MG 24 HR ER tablet Take 120 mg by mouth 6/11/2023 at 0706 Yes Reported, Patient Yes    metFORMIN (GLUCOPHAGE XR) 500 MG 24 hr tablet Take 1,000 mg by mouth daily (with dinner) 6/11/2023 at 1730 Yes Burke Gamino APRN CNP Yes    mirtazapine (REMERON) 7.5 MG tablet Take 7.5 mg by mouth At Bedtime 6/11/2023 at 2000 Yes Reported, Patient Yes    nitroglycerin (NITROSTAT) 0.4 MG SL tablet [NITROGLYCERIN (NITROSTAT) 0.4 MG SL TABLET] Place 0.4 mg under the tongue as needed. Unknown Yes Provider, Historical Yes    ondansetron (ZOFRAN) 8 MG tablet Take 8 mg by mouth every 8 hours as needed for nausea Unknown Yes Reported, Patient     pantoprazole (PROTONIX) 40 MG EC tablet Take 40 mg by mouth daily 6/11/2023 at 0557 Yes Burke Gamino APRN CNP     pioglitazone (ACTOS) 30 MG tablet Take 30 mg by mouth daily 6/11/2023 at 0706 Yes Reported, Patient No    polyethylene glycol (MIRALAX) 17 g packet Take 17 g by mouth daily as needed for constipation Unknown Yes Reported, Patient     senna-docusate (SENOKOT-S/PERICOLACE) 8.6-50 MG tablet Take 2 tablets by mouth 2 times daily 6/11/2023 at 0708 Yes Reported, Patient     terazosin  (HYTRIN) 10 MG capsule Take 1 capsule by mouth At Bedtime 6/11/2023 at 2000 Yes Reported, Patient Yes    vitamins  A,C,E-zinc-copper (ICAPS AREDS) 7,160-113-100 unit-mg-unit TbEC [VITAMINS  A,C,E-ZINC-COPPER (ICAPS AREDS) 7,160-113-100 UNIT-MG-UNIT TBEC] Take 1 capsule by mouth daily. 6/11/2023 at 0706 Yes Provider, Historical

## 2023-06-12 NOTE — ED TRIAGE NOTES
BIBA from home after being found unresponsive with a BG of 29. EMS gave 250ML of D10 BG went up to 150s en route. No other complaints. Pt is alert and oriented. BG now 147 at 0746     Triage Assessment     Row Name 06/12/23 0743       Triage Assessment (Adult)    Airway WDL WDL       Respiratory WDL    Respiratory WDL WDL       Skin Circulation/Temperature WDL    Skin Circulation/Temperature WDL X;all    Skin Temperature cool       Cardiac WDL    Cardiac WDL WDL       Peripheral/Neurovascular WDL    Peripheral Neurovascular WDL WDL       Cognitive/Neuro/Behavioral WDL    Cognitive/Neuro/Behavioral WDL WDL

## 2023-06-12 NOTE — CONSULTS
Care Management Initial Consult    General Information  Assessment completed with: VM-chart review, Children, Kati-Daughter  Type of CM/SW Visit: Initial Assessment    Primary Care Provider verified and updated as needed:     Readmission within the last 30 days: no previous admission in last 30 days      Reason for Consult: discharge planning  Advance Care Planning: Advance Care Planning Reviewed: questions answered          Communication Assessment  Patient's communication style: spoken language (English or Bilingual)             Cognitive  Cognitive/Neuro/Behavioral: WDL                      Living Environment:   People in home: facility resident     Current living Arrangements:  (LTC-  [facility])      Able to return to prior arrangements: yes       Family/Social Support:  Care provided by: self (LTC Staff)  Provides care for: no one     Children          Description of Support System: Supportive, Involved    Support Assessment: Adequate family and caregiver support, Adequate social supports    Current Resources:   Patient receiving home care services: No     Community Resources: Skilled Nursing Facility  Equipment currently used at home:    Supplies currently used at home:      Employment/Financial:  Employment Status: retired        Financial Concerns: No concerns identified   Referral to Financial Worker: No       Does the patient's insurance plan have a 3 day qualifying hospital stay waiver?  Yes   Will the waiver be used for post-acute placement? TBD    Lifestyle & Psychosocial Needs:  Social Determinants of Health     Tobacco Use: Medium Risk (6/12/2023)    Patient History      Smoking Tobacco Use: Former      Smokeless Tobacco Use: Never      Passive Exposure: Not on file   Alcohol Use: Not on file   Financial Resource Strain: Not on file   Food Insecurity: Not on file   Transportation Needs: Not on file   Physical Activity: Not on file   Stress: Not on file   Social Connections: Not on file    Intimate Partner Violence: Not on file   Depression: Not on file   Housing Stability: Not on file       Functional Status:  Prior to admission patient needed assistance:   Dependent ADLs:: Bathing, Dressing, Eating, Grooming, Incontinence, Positioning, Transfers  Dependent IADLs:: Cleaning, Cooking, Laundry, Shopping, Meal Preparation, Medication Management, Money Management, Transportation, Incontinence  Assesssment of Functional Status: Needs placement in a SNF/TCF for rehabilitation    Mental Health Status:  Mental Health Status: No Current Concerns       Chemical Dependency Status:  Chemical Dependency Status: No Current Concerns             Values/Beliefs:  Spiritual, Cultural Beliefs, Scientologist Practices, Values that affect care: no               Additional Information:  Chart reviewed. Case discussed with bedside RN. Pt reportedly confused per chart. Chart review suggests pt is from [facility] for LTC. SNF has a Hx of taking MC or confused patients.     Writer met with pt--unclear if pt is A&Ox4 or able to converse well. Due to lingering confusion, writer will discuss DUVALL paperwork with family.    Writer attempted to contact son, Aamir (P: [phone]), but phone just rang, never transitioning over to a .     Based on chart review, pt has a Kati (P: [phone]) listed in her chart as the daughter Kati .  Writer left generic  with request to be called back. Writer received call back from Kati. Writer discussed DUVALL paperwork over the phone with Kati. Writer answered any of Kati's questions regarding insurance coverage. Writer encouraged Kati to follow up with pt's insurance plan directly to receive the most accurate information. DUVALL form was left in pt's room. Kati will try to come into hospital tonight to sign.    Ruma denies any concerns with pt being at [facility] and anticipates pt would return at discharge.    Referrals have been made to the  "following facilities and their status is as follows:    [facility]  [address] [place] [address]  P: [phone]  - Writer left VM with Venkatesh in \"admissions\". Unclear if he is the correct person.    ADDENDUM:    DUVALL form was signed by Kati and placed on chart.    PRATIBHA Chaudhray on 6/12/2023 at 7:37 PM      ________________    JOSEF Chaudhary, [facility]  ED/Observation   [facility]  Phone: [phone]  Pager: [phone]  Fax: [phone]    On-call pager, [phone], 4:00pm to midnight      "

## 2023-06-12 NOTE — H&P
===========================================================  Resident/Fellow Attestation   I, Dejan Richard MD, was present with the medical/DONNA student who participated in the service and in the documentation of the note.  I have verified the history and personally performed the physical exam and medical decision making.  I agree with the assessment and plan of care as documented in the note.    Patient is a 89-year-old male presenting after being found less responsive than usual at his care facility.  Found to have a blood sugar of 29.  Corrected in route to the hospital.  Arrives hemodynamically stable and mostly without complaint, disoriented to time but oriented to person and place, which is his baseline.  Does not remember details of the morning.  No traumatic fall, no injuries found on exam.  Nonfocal neuro exam.    Etiology of this episode is more than likely hypoglycemia.  No hypotension on arrival and this episode was not associated with position changes, low likelihood of orthostatic hypotension.  That said, on multiple hypoglycemic agents as well as hypertensive agents raising suspicion for polypharmacy contributing as well.  Did have multiple recent escalations of his long-acting insulin regimen prior to this episode which likely contributed.    We will monitor on de-escalated insulin regimen and hold additional hypoglycemic agents. Will consider utility of possible endocrine consult vs adjusting to less hypoglycemic agents such as an SGLT2i or GLP1. We will restart his home antihypertensive regimen.  Additional incidental findings include microscopic hematuria as well as new pancytopenia in the setting of chronic anemia and thrombocytopenia. Likely low-level troponin elevation secondary to demand in setting of hypoglycemia. Further work-up as per below.    We will plan to observe overnight and reassess in the morning.  If back to his baseline with reassuring labs, may return to his prior care facility  in the next 1 to 2 days.    Dejan Richard MD  PGY2  Date of Service (when I saw the patient): 06/12/23  ================================================    [facility]    History and Physical - Medicine Service, MAROON TEAM 3       Date of Admission:  6/12/2023    Assessment & Plan      Barak Berry is a 89 year old male with past medical history significant for T2DM, HTN, CKD stage III, NSTEMI, CAD (s/p CABG 1999, 2011), chronic HFpEF, sick sinus syndrome (s/p pacemaker), PAD, dyslipidemia, and GERD who is admitted for observation for hypoglycemia and elevated troponin after being found down at his SNF with a BG of 29.    #Diabetes Mellitus, type 2  Pt was BIBA after he was found unresponsive in bed at his skilled nursing facility (SNF) with a blood glucose of 29. Per chart review, his night time glargine was recently increased from 16 units to 20 units. He was given D10 IVF en route, his BG on admission is now 146. The pt does not recall anything leading up to when the paramedics arrived, but he denies falling or hitting his head. He denies feeling any fevers, chills, dizziness, or lightheadedness leading up to when he was found down. He is alert and oriented to person and place, but not time. His SNF administers all of his medications for him. Hgb A1c is 9.9%.  - Glargine 8 units at bedtime  - Continue pta aspart sliding scale regimen 3x daily before meals  - Continue pta metformin  - Hold pta pioglitazone  - Hypoglycemia protocol  - BG check w/ meal and at 2am  - Will consider consulting endocrinology for BG management recs tomorrow (6/13)     #Recurrent falls  #Back pain, chronic  Admitted for two different mechanical falls in the last year, most recently admitted on 5/17 to [facility] with a mechanical fall w/ back pain.  Pt was found unresposive in bed at SNF, last known well time sometime evening of 6/11. Pt ambulates via walker at SNF without  assistance prior to recent fall, but has needed assistance walking and using bathroom in last moth. There is no evidence of trauma on head-to-toe trauma physical exam, neurological exam shows no focal deficits. Pt endorses back pain, although not changed from baseline. Pt denies falling or hitting his head.  - Repeat CBC in the am  - Repeat neurological exam in the am  - Acetaminophen 975mg tablet q6h PRN for back pain  - Continue pta dilaudid 2mg tablet q8h PRN for back pain  - Low threshold for noncontrast CT head if mental status becomes altered and/or hemoglobin drops    #Hypertension  /66 on admission, pt likely did not take pta hypertension medications this morning.  - Continue pta coreg, lasix, and terazosin  - Hold pta hydralazine  - Will consider adding ACE or ARB    #Hematuria  #Dysuria  #Polyuria  Pt endorses urinary frequency (has to urinate every 30 minutes), urgency, and burning while urinating. UA significant for blood and RBCs, negative for WBCs and leukocyte esterase. Pt does not receive straight catheretization at [facility], so no obvious trauma.  - Will consider ordering repeat UA for outpatient workup    #Chronic Diastolic Congestive Heart Failure  #CAD, s/p CABG 1999 & 2011   #Sick sinus syndrome s/p pacemaker  #Hx of NSTEMI (medically managed January 2023)  #PAD  #Tropinemia  Mildly elevated troponin on admission (29). No cardiac symptoms and EKG shows atrial paced rhythm w/ RBBB which has been seen on previous EKGs. Will reorder troponin at this time and ctm, if cardiac symptoms arise will repeat EKG and troponin.   - Repeat troponin  - Continuous cardiac monitoring  - Continue pta clopidogrel, isosorbide mononitrate, aspirin  - Continuous cardiac monitoring    #Pancytopenia  Baseline anemia (Hgb 10-11) and baseline thrombocytopenia (118-185). On admission, new leukopenia (WBC 3.8), and slightly worsened thrombocytopenia (plts 105).   - Will ctm daily CBC  - Peripheral blood smear to assess  for MDS in setting of advanced age    #Polypharmacy  On many medications, as above. Will consider changing/streamlining diabetes regimen and antihypertensive medications.    Less active/chronic problems:    #Chronic Kidney Disease stage III  Hx of chronic kidney disease stage III. Crt on admission 0.94, baseline 1.3-1.8. Estimated GFR on admission 77, previously 40-58.   - Continue to monitor and avoid nephrotoxins    #Dyslipidemia  - Continue pta atorvastatin     #GERD  - Continue pta pantoprazole    #Depression  - Continue pta citalopram and mirtazapine     Diet: Regular Diet Adult    DVT Prophylaxis: Pneumatic Compression Devices  Byrd Catheter: Not present  Fluids: Dextrose 10% infusion  Lines: None     Cardiac Monitoring: None  Code Status:  DNR/DNI    Clinically Significant Risk Factors Present on Admission                # Drug Induced Platelet Defect: home medication list includes an antiplatelet medication   # Hypertension: Noted on problem list     # DMII: A1C = 9.5 % (Ref range: <5.7 %) within past 6 months             Disposition Plan      Expected Discharge Date: 06/13/2023                The patient's care was discussed with the Attending Physician, Dr. Hardy.      Leia Whitney  Medical Student  Medicine Service, [address]  Securely message with [employer] (more info)  Text page via [facility] Paging/Directory   See signed in provider for up to date coverage information  ______________________________________________________________________    Chief Complaint   Hypoglycemia    History is obtained from the patient and ED description of EMS handoff.    History of Present Illness   Barak Berry is a 89 year old male with past medical history significant for T2DM, HTN, CKD stage III, NSTEMI, CAD (s/p CABG 1999, 2011), chronic HFpEF, sick sinus syndrome (s/p pacemaker), PAD, dyslipidemia, and GERD who is admitted for observation for  "hypoglycemia and elevated troponin after being found down at his SNF with a BG of 29.    Pt was BIBA after he was found unresponsive at his skilled nursing facility (SNF) with a blood glucose of 29. Per chart review, his night time glargine was recently increased from 16 units to 20 units. He was given D10 IVF en route, his BG on admission is now 146. The pt does not recall anything leading up to when the paramedics arrived, but he denies falling or hitting his head. He denies feeling any fevers, chills, dizziness, or lightheadedness leading up to when he was found down. He does not remember if he had breakfast this morning or if he took his normal medications. He recalls having had one episode of hypoglycemia during a previous hospital admission, but he says that he had none of the same \"warning signs\" that he felt during that previous episode (nausea, confusion, lightheadedness). He is alert and oriented to person and place, but not time. There is no evidence of trauma at this time. He was also found to have a mildly elevated troponin (29) on admission, EKG shows atrial paced rhythm w/ RBBB which has been seen on previous EKGs.        Past Medical History    Past Medical History:   Diagnosis Date     CAD (coronary artery disease)      Chronic diastolic congestive heart failure (H)      CKD (chronic kidney disease) stage 3, GFR 30-59 ml/min (H)      Diabetes mellitus, type 2 (H)      Hypertension      PAD (peripheral artery disease) (H)      Recurrent falls      Thrombocytopenia (H)        Past Surgical History   Past Surgical History:   Procedure Laterality Date     APPENDECTOMY       ARTHROSCOPY KNEE Left      CARDIAC SURGERY       JOINT REPLACEMENT       LUMBAR LAMINECTOMY       TONSILLECTOMY       TOTAL KNEE ARTHROPLASTY Left      [facility] CABG, VEIN, SINGLE      Description: CABG (CABG);  Recorded: 08/08/2012;  Comments: 1999 - 2 vessel bypass  - SVG to RCA, SVG to LAD; ; 2011 - Redo bypass - LVG thru LAD     [facility] " COLONOSCOPY W/WO BRUSH/WASH N/A 1/3/2019    Procedure: COLONOSCOPY;  Surgeon: Tiarra Thornton MD;  Location: [facility];  Service: Gastroenterology     [facility] CORONARY STENT PERCUT, INITIAL VESSEL      Description: Cath Stent Placement;  Recorded: 08/08/2012;  Comments: 1999 - PTCA/ stent of proximal RCA; ;            PTCA of mid RCA; ; 2004 - PTCA of proximal segment of LAD; NACHO of proximal segment LAD; PTCA of proximal segment of circumflex,NACHO to proximal segment of circumflex.       Prior to Admission Medications   Prior to Admission Medications   Prescriptions Last Dose Informant Patient Reported? Taking?   HYDROmorphone (DILAUDID) 2 MG tablet Past Week  Yes Yes   Sig: Take 1 tablet (2 mg) by mouth every 8 hours as needed for pain or severe pain   acetaminophen (TYLENOL) 500 MG tablet Past Week  Yes Yes   Sig: Take 500 mg by mouth every 4 hours as needed AND 1 TAB PO Q4H PRN   acetaminophen (TYLENOL) 500 MG tablet 6/11/2023 at 1959  Yes Yes   Sig: Take 1,000 mg by mouth 3 times daily   aspirin 81 MG EC tablet 6/11/2023 at 0706  Yes Yes   Sig: [ASPIRIN 81 MG EC TABLET] Take 81 mg by mouth daily.   atorvastatin (LIPITOR) 40 MG tablet 6/11/2023 at 1959  Yes Yes   Sig: Take 40 mg by mouth every evening   carvedilol (COREG) 25 MG tablet 6/11/2023 at 0708  Yes Yes   Sig: Take 25 mg by mouth 2 times daily   citalopram (CELEXA) 10 MG tablet 6/11/2023 at 0706  Yes Yes   Sig: Take 20 mg by mouth daily   clopidogrel (PLAVIX) 75 MG tablet 6/11/2023 at 0706  Yes Yes   Sig: Take 1 tablet by mouth every morning   furosemide (LASIX) 20 MG tablet 6/11/2023 at 0706  Yes Yes   Sig: Take 20 mg by mouth   hydrALAZINE (APRESOLINE) 25 MG tablet 6/11/2023 at 1959  Yes Yes   Sig: Take 75 mg by mouth 2 times daily   insulin aspart (NOVOLOG VIAL) 100 UNITS/ML vial 6/11/2023 at dinner  Yes Yes   Sig: Inject Subcutaneous 3 times daily (with meals) Inject as per sliding scale: if 200 - 250 = 2 units ; 251 - 300  = 4 units;  301 - 350 = 6 units; 351 - 400 = 8 units; 401  - 450 = 10 units; 451+ = 12 units   insulin glargine (LANTUS PEN) 100 UNIT/ML pen 6/11/2023 at 1959  Yes Yes   Sig: Inject 20 Units Subcutaneous At Bedtime   isosorbide mononitrate (IMDUR) 30 MG 24 hr tablet 6/11/2023 at 0706  Yes Yes   Sig: Take 30 mg by mouth daily Give with 120 mg tab   isosorbide mononitrate CR (IMDUR) 120 MG 24 HR ER tablet 6/11/2023 at 0706  Yes Yes   Sig: Take 120 mg by mouth   metFORMIN (GLUCOPHAGE XR) 500 MG 24 hr tablet 6/11/2023 at 1730  Yes Yes   Sig: Take 1,000 mg by mouth daily (with dinner)   mirtazapine (REMERON) 7.5 MG tablet 6/11/2023 at 2000  Yes Yes   Sig: Take 7.5 mg by mouth At Bedtime   nitroglycerin (NITROSTAT) 0.4 MG SL tablet Unknown  Yes Yes   Sig: [NITROGLYCERIN (NITROSTAT) 0.4 MG SL TABLET] Place 0.4 mg under the tongue as needed.   ondansetron (ZOFRAN) 8 MG tablet Unknown  Yes Yes   Sig: Take 8 mg by mouth every 8 hours as needed for nausea   pantoprazole (PROTONIX) 40 MG EC tablet 6/11/2023 at 0557  Yes Yes   Sig: Take 40 mg by mouth daily   pioglitazone (ACTOS) 30 MG tablet 6/11/2023 at 0706  Yes Yes   Sig: Take 30 mg by mouth daily   polyethylene glycol (MIRALAX) 17 g packet Unknown  Yes Yes   Sig: Take 17 g by mouth daily as needed for constipation   senna-docusate (SENOKOT-S/PERICOLACE) 8.6-50 MG tablet 6/11/2023 at 0708  Yes Yes   Sig: Take 2 tablets by mouth 2 times daily   terazosin (HYTRIN) 10 MG capsule 6/11/2023 at 2000  Yes Yes   Sig: Take 1 capsule by mouth At Bedtime   vitamins  A,C,E-zinc-copper (ICAPS AREDS) 7,160-113-100 unit-mg-unit TbEC 6/11/2023 at 0706  Yes Yes   Sig: [VITAMINS  A,C,E-ZINC-COPPER (ICAPS AREDS) 7,160-113-100 UNIT-MG-UNIT TBEC] Take 1 capsule by mouth daily.      Facility-Administered Medications: None        Physical Exam   Vital Signs:     BP: (!) 179/66 Pulse: 60   Resp: 18 SpO2: 98 % O2 Device: None (Room air)    Weight: 0 lbs 0 oz    General Appearance: Alert, lying in bed, appears  stated age  HEENT: Atraumatic, pupils equal and reactive  Spine/back: Pt endorse lower back pain, but that is his baseline. Spine straight and nontender on palpation, no spinal deformities or step offs.  CV: RRR, no murmurs heard on auscultation  Respiratory: No increased wob, respirations regular and unlabored. Lungs clear to auscultation bilaterally  Abdominal: Soft, nontender.Bruising from insulin injection noted.  GI/: Endorses increased frequency of urination, burning with urination, and dark color to urine. Bowel   Neuro: Pupils equal and reactive to light, EOM intact bilaterally. Symmetric facial expressions, upper extremity strength and sensation intact bilaterally, lower extremity strength and sensation intact bilaterally. Trapezius muscle strength intact bilaterally. Oriented to person and place, not oriented to time.    Medical Decision Making       Please see A&P for additional details of medical decision making.      Data     I have personally reviewed the following data over the past 24 hrs:    3.8 (L)  \   10.5 (L)   / 105 (L)     140 104 44.2 (H) /  149 (H)   3.7 24 0.94 \       ALT: 19 AST: 38 AP: 121 TBILI: 0.3   ALB: 3.9 TOT PROTEIN: 6.3 (L) LIPASE: N/A       Trop: 29 (H) BNP: N/A       TSH: 2.18 T4: N/A A1C: 9.9 (H)       Procal: N/A CRP: N/A Lactic Acid: 1.0

## 2023-06-12 NOTE — ED NOTES
Bed: ED16  Expected date:   Expected time:   Means of arrival:   Comments:  SPF20  89M  From SNF   Bg 50 down to 29 unresponsive  3/4 bag of D10 up to 125bg  VSS yellow

## 2023-06-12 NOTE — ED PROVIDER NOTES
[place] [facility]MENT ([facility])    6/12/23       ED PROVIDER NOTE    History     Chief Complaint   Patient presents with     Hypoglycemia     HPI  Barak [facility] is a 89 year old male with past medical history significant for T2DM, HTN, CKD stage III, NSTEMI, CAD (s/p CABG 1999, 2011), chronic HFpEF, sick sinus syndrome (s/p pacemaker), PAD, dyslipidemia, and GERD who presents to the ED biba after being found unresponsive with a BG of 29. EMS gave 250ML of D10 BG went up to 150s en route, 147 on arrival. The pt is alert and oriented to person and place but is not sure why he is here. When I tell him that he was found unresponsive with a glucose of 29 he was very surprised. They did increase his lantus one week ago. He currently denies any symptoms here and just states the he needs to urinate very soon.    Past Medical History  No past medical history on file.  Past Surgical History:   Procedure Laterality Date     APPENDECTOMY       ARTHROSCOPY KNEE Left      CARDIAC SURGERY       JOINT REPLACEMENT       LUMBAR LAMINECTOMY       TONSILLECTOMY       TOTAL KNEE ARTHROPLASTY Left      [facility] CABG, VEIN, SINGLE      Description: CABG (CABG);  Recorded: 08/08/2012;  Comments: 1999 - 2 vessel bypass  - SVG to RCA, SVG to LAD; ; 2011 - Redo bypass - LVG thru LAD     [facility] COLONOSCOPY W/WO BRUSH/WASH N/A 1/3/2019    Procedure: COLONOSCOPY;  Surgeon: Tiarra Thornton MD;  Location: [facility];  Service: Gastroenterology     [facility] CORONARY STENT PERCUT, INITIAL VESSEL      Description: Cath Stent Placement;  Recorded: 08/08/2012;  Comments: 1999 - PTCA/ stent of proximal RCA; ;            PTCA of mid RCA; ; 2004 - PTCA of proximal segment of LAD; NACHO of proximal segment LAD; PTCA of proximal segment of circumflex,NACHO to proximal segment of circumflex.     acetaminophen (TYLENOL) 500 MG tablet  acetaminophen (TYLENOL) 500 MG tablet  aspirin 81 MG EC tablet  atorvastatin (LIPITOR) 40 MG  tablet  carvedilol (COREG) 25 MG tablet  citalopram (CELEXA) 10 MG tablet  clopidogrel (PLAVIX) 75 MG tablet  furosemide (LASIX) 20 MG tablet  hydrALAZINE (APRESOLINE) 25 MG tablet  HYDROmorphone (DILAUDID) 2 MG tablet  insulin aspart (NOVOLOG VIAL) 100 UNITS/ML vial  insulin glargine (LANTUS PEN) 100 UNIT/ML pen  isosorbide mononitrate (IMDUR) 30 MG 24 hr tablet  isosorbide mononitrate CR (IMDUR) 120 MG 24 HR ER tablet  metFORMIN (GLUCOPHAGE XR) 500 MG 24 hr tablet  mirtazapine (REMERON) 7.5 MG tablet  nitroglycerin (NITROSTAT) 0.4 MG SL tablet  ondansetron (ZOFRAN) 8 MG tablet  pantoprazole (PROTONIX) 40 MG EC tablet  pioglitazone (ACTOS) 30 MG tablet  polyethylene glycol (MIRALAX) 17 g packet  senna-docusate (SENOKOT-S/PERICOLACE) 8.6-50 MG tablet  terazosin (HYTRIN) 10 MG capsule  vitamins  A,C,E-zinc-copper (ICAPS AREDS) 7,160-113-100 unit-mg-unit TbEC      Allergies   Allergen Reactions     Acetaminophen Nausea and Vomiting     Other reaction(s): Nausea/vomiting     Amlodipine Unknown     Cilostazol Unknown     Hydrocodone      Other reaction(s): Nausea/vomiting     Vicodin [Hydrocodone-Acetaminophen] Unknown     Family History  No family history on file.  Social History   Social History     Tobacco Use     Smoking status: Former     Packs/day: 1.00     Years: 45.00     Pack years: 45.00     Types: Cigarettes     Quit date: 1960     Years since quittin.4     Smokeless tobacco: Never   Substance Use Topics     Alcohol use: Yes     Alcohol/week: 1.0 standard drink of alcohol     Comment: Alcoholic Drinks/day: rare     Drug use: No      Past medical history, past surgical history, medications, allergies, family history, and social history were reviewed with the patient. No additional pertinent items.      A medically appropriate review of systems was performed with pertinent positives and negatives noted in the HPI, and all other systems negative.    Physical Exam   BP: (!) 179/66  Pulse: 60  Resp:  18  SpO2: 98 %  Physical Exam  Vitals and nursing note reviewed.   Constitutional:       General: He is not in acute distress.     Appearance: Normal appearance. He is not toxic-appearing.   HENT:      Head: Atraumatic.      Mouth/Throat:      Mouth: Mucous membranes are moist.      Pharynx: Oropharynx is clear.   Eyes:      General: No scleral icterus.     Extraocular Movements: Extraocular movements intact.      Conjunctiva/sclera: Conjunctivae normal.   Cardiovascular:      Rate and Rhythm: Normal rate and regular rhythm.      Heart sounds: Normal heart sounds. No murmur heard.     No friction rub. No gallop.   Pulmonary:      Effort: Pulmonary effort is normal. No respiratory distress.      Breath sounds: Normal breath sounds. No wheezing, rhonchi or rales.   Abdominal:      General: Abdomen is flat. Bowel sounds are normal.      Palpations: Abdomen is soft.      Tenderness: There is no abdominal tenderness. There is no guarding or rebound.   Genitourinary:     Penis: Normal.       Testes: Normal.   Musculoskeletal:         General: No deformity. Normal range of motion.      Cervical back: Neck supple. No tenderness.      Right lower leg: No edema.      Left lower leg: No edema.   Skin:     General: Skin is warm and dry.      Findings: No erythema or rash.   Neurological:      General: No focal deficit present.      Mental Status: He is alert.      Cranial Nerves: No cranial nerve deficit.      Sensory: No sensory deficit.      Motor: No weakness.      Comments: Oriented to person and place.   Psychiatric:         Mood and Affect: Mood normal.         Behavior: Behavior normal.         Thought Content: Thought content normal.         ED Course, Procedures, & Data      Procedures       ED Course Selections:        EKG Interpretation:      Interpreted by BRUCE TOVAR MD  Time reviewed: 0751  Symptoms at time of EKG: None   Rhythm: atrial paced rhythm  Rate: 63  Axis: Normal  Ectopy: none  Conduction: right  bundle branch block (complete)  ST Segments/ T Waves: No ST-T wave changes  Q Waves: none  Comparison to prior: Unchanged    Clinical Impression: no acute changes                           Results for orders placed or performed during the hospital encounter of 06/12/23   XR Chest 2 Views     Status: None    Narrative    Chest 2 views    INDICATION: Acute medical syndrome    COMPARISON: None    FINDINGS: Heart size normal. Prior median sternotomy again noted.  Elevated left hemidiaphragm. Left subclavian transvenous approach  bipolar pacemaker with leads projecting in the expected right atrium  and right ventricle. Lateral view is quite underpenetrated. Changes of  prior CABG. No suspicious pulmonary findings. No obvious edema or  pleural effusion. Calcification at the aortic knob.      Impression    IMPRESSION: Atherosclerosis with prior CABG changes present.  Pacemaker. Elevated left hemidiaphragm.    OMAR BYERS MD         SYSTEM ID:  [id number]   [place] Draw     Status: None    Narrative    The following orders were created for panel order [place] Draw.  Procedure                               Abnormality         Status                     ---------                               -----------         ------                     Extra Blue Top Tube[[id number]]                              Final result               Extra Red Top Tube[[id number]]                               Final result               Extra Green Top (Lithium...[[id number]]                      Final result               Extra Purple Top Tube[[id number]]                            Final result                 Please view results for these tests on the individual orders.   Lactic acid whole blood     Status: Normal   Result Value Ref Range    Lactic Acid 1.0 0.7 - 2.0 mmol/L   Extra Blue Top Tube     Status: None   Result Value Ref Range    Hold Specimen [facility]    Extra Red Top Tube     Status: None   Result Value Ref Range    Hold Specimen [facility]     Extra Green Top (Lithium Heparin) Tube     Status: None   Result Value Ref Range    Hold Specimen [facility]    Extra Purple Top Tube     Status: None   Result Value Ref Range    Hold Specimen [facility]    Basic metabolic panel     Status: Abnormal   Result Value Ref Range    Sodium 140 136 - 145 mmol/L    Potassium 3.7 3.4 - 5.3 mmol/L    Chloride 104 98 - 107 mmol/L    Carbon Dioxide (CO2) 24 22 - 29 mmol/L    Anion Gap 12 7 - 15 mmol/L    Urea Nitrogen 44.2 (H) 8.0 - 23.0 mg/dL    Creatinine 0.94 0.67 - 1.17 mg/dL    Calcium 9.0 8.2 - 9.6 mg/dL    Glucose 172 (H) 70 - 99 mg/dL    GFR Estimate 77 >60 mL/min/1.73m2   Troponin T, High Sensitivity     Status: Abnormal   Result Value Ref Range    Troponin T, High Sensitivity 29 (H) <=22 ng/L   UA with Microscopic reflex to Culture     Status: Abnormal    Specimen: Urine, Midstream   Result Value Ref Range    Color Urine Light Yellow Colorless, Straw, Light Yellow, Yellow    Appearance Urine Clear Clear    Glucose Urine Negative Negative mg/dL    Bilirubin Urine Negative Negative    Ketones Urine Negative Negative mg/dL    Specific Gravity Urine 1.011 1.003 - 1.035    Blood Urine Moderate (A) Negative    pH Urine 6.0 5.0 - 7.0    Protein Albumin Urine 30 (A) Negative mg/dL    Urobilinogen Urine Normal Normal, 2.0 mg/dL    Nitrite Urine Negative Negative    Leukocyte Esterase Urine Negative Negative    RBC Urine 95 (H) <=2 /HPF    WBC Urine <1 <=5 /HPF    Transitional Epithelials Urine <1 <=1 /HPF    Narrative    Urine Culture not indicated   CBC with platelets and differential     Status: Abnormal   Result Value Ref Range    WBC Count 3.8 (L) 4.0 - 11.0 10e3/uL    RBC Count 3.33 (L) 4.40 - 5.90 10e6/uL    Hemoglobin 10.5 (L) 13.3 - 17.7 g/dL    Hematocrit 34.3 (L) 40.0 - 53.0 %     (H) 78 - 100 fL    MCH 31.5 26.5 - 33.0 pg    MCHC 30.6 (L) 31.5 - 36.5 g/dL    RDW 14.4 10.0 - 15.0 %    Platelet Count 105 (L) 150 - 450 10e3/uL    % Neutrophils 77 %    % Lymphocytes 17 %     % Monocytes 5 %    % Eosinophils 1 %    % Basophils 0 %    % Immature Granulocytes 0 %    NRBCs per 100 WBC 0 <1 /100    Absolute Neutrophils 2.9 1.6 - 8.3 10e3/uL    Absolute Lymphocytes 0.6 (L) 0.8 - 5.3 10e3/uL    Absolute Monocytes 0.2 0.0 - 1.3 10e3/uL    Absolute Eosinophils 0.0 0.0 - 0.7 10e3/uL    Absolute Basophils 0.0 0.0 - 0.2 10e3/uL    Absolute Immature Granulocytes 0.0 <=0.4 10e3/uL    Absolute NRBCs 0.0 10e3/uL   EKG 12 lead     Status: None   Result Value Ref Range    Systolic Blood Pressure  mmHg    Diastolic Blood Pressure  mmHg    Ventricular Rate 63 BPM    Atrial Rate 63 BPM    LA Interval 228 ms    QRS Duration 136 ms     ms    QTc 532 ms    P Axis 47 degrees    R AXIS 22 degrees    T Axis 38 degrees    Interpretation ECG       Atrial-paced rhythm with prolonged AV conduction  Right bundle branch block  Cannot rule out Inferior infarct , age undetermined  Abnormal ECG    Unconfirmed report - interpretation of this ECG is computer generated - see medical record for final interpretation  Confirmed by - EMERGENCY ROOM, PHYSICIAN ([id number]),  DRAGAN CUMMINGS ([id number]) on 6/12/2023 8:35:46 AM     CBC with platelets differential     Status: Abnormal    Narrative    The following orders were created for panel order CBC with platelets differential.  Procedure                               Abnormality         Status                     ---------                               -----------         ------                     CBC with platelets and d...[[id number]]  Abnormal            Final result                 Please view results for these tests on the individual orders.     Medications   dextrose 10% infusion ( Intravenous Rate/Dose Verify 6/12/23 1033)   dextrose 10% infusion (0 mLs Intravenous Stopped 6/12/23 0936)   acetaminophen (TYLENOL) tablet 1,000 mg (1,000 mg Oral $Given 6/12/23 0956)     Labs Ordered and Resulted from Time of ED Arrival to Time of ED Departure   BASIC METABOLIC PANEL -  Abnormal       Result Value    Sodium 140      Potassium 3.7      Chloride 104      Carbon Dioxide (CO2) 24      Anion Gap 12      Urea Nitrogen 44.2 (*)     Creatinine 0.94      Calcium 9.0      Glucose 172 (*)     GFR Estimate 77     TROPONIN T, HIGH SENSITIVITY - Abnormal    Troponin T, High Sensitivity 29 (*)    ROUTINE UA WITH MICROSCOPIC REFLEX TO CULTURE - Abnormal    Color Urine Light Yellow      Appearance Urine Clear      Glucose Urine Negative      Bilirubin Urine Negative      Ketones Urine Negative      Specific Gravity Urine 1.011      Blood Urine Moderate (*)     pH Urine 6.0      Protein Albumin Urine 30 (*)     Urobilinogen Urine Normal      Nitrite Urine Negative      Leukocyte Esterase Urine Negative      RBC Urine 95 (*)     WBC Urine <1      Transitional Epithelials Urine <1     CBC WITH PLATELETS AND DIFFERENTIAL - Abnormal    WBC Count 3.8 (*)     RBC Count 3.33 (*)     Hemoglobin 10.5 (*)     Hematocrit 34.3 (*)      (*)     MCH 31.5      MCHC 30.6 (*)     RDW 14.4      Platelet Count 105 (*)     % Neutrophils 77      % Lymphocytes 17      % Monocytes 5      % Eosinophils 1      % Basophils 0      % Immature Granulocytes 0      NRBCs per 100 WBC 0      Absolute Neutrophils 2.9      Absolute Lymphocytes 0.6 (*)     Absolute Monocytes 0.2      Absolute Eosinophils 0.0      Absolute Basophils 0.0      Absolute Immature Granulocytes 0.0      Absolute NRBCs 0.0     LACTIC ACID WHOLE BLOOD - Normal    Lactic Acid 1.0     GLUCOSE MONITOR NURSING POCT     XR Chest 2 Views   Final Result   IMPRESSION: Atherosclerosis with prior CABG changes present.   Pacemaker. Elevated left hemidiaphragm.      OMRA BYERS MD            SYSTEM ID:  [id number]             Critical care was not performed.     Medical Decision Making  The patient's presentation was of moderate complexity (a chronic illness mild to moderate exacerbation, progression, or side effect of treatment).    The patient's evaluation  involved:  ordering and/or review of 3+ test(s) in this encounter (see separate area of note for details)    The patient's management necessitated high risk (a decision regarding hospitalization).      Assessment & Plan    89 yo male here by EMS after he was found unresponsive at his nursing home secondary to a glucose of 50. Per medics his glucose was  29 and he was given D10 IV fluids en route. Upon arrival he is awake and alert and doesn't know why he is in the hospital. He denies having any symptoms last night or currently. Labs ordered and IV D10 continued.    Labs show a glucose of 172, troponin 29, Hgb 10.5, urine with moderate blood and 95 RBCs, otherwise unremarkable. CXR showed atherosclerosis with prior CABG changes present.  Pacemaker. Elevated left hemidiaphragm.    He will be admitted to medicine on D10 IV on observation due to the fact that he is on long-acting insulin as well as an elevated troponin.    I have reviewed the nursing notes. I have reviewed the findings, diagnosis, plan and need for follow up with the patient.    New Prescriptions    No medications on file       Final diagnoses:   Hypoglycemia   Elevated troponin       Romulo Bowles MD  [facility] EMERGENCY DEPARTMENT  6/12/2023     Romulo Bowles MD  06/12/23 1144       Romulo Bowles MD  06/12/23 1147

## 2023-06-13 NOTE — PLAN OF CARE
Goal Outcome Evaluation:    BP (!) 151/51 (BP Location: Right arm, Patient Position: Supine)   Pulse 81   Temp 97.8  F (36.6  C) (Oral)   Resp 20   SpO2 98%     3614-2350    Admitted for hypoglycemia. Blood glucose within goal this shift. Blood sugar monitored q1h. A&Ox4, tolerable pain level on RLE. No prn pain med needed. Elevated BP, but within parameter. SR with A paced infrequently. Denied chest pain. Transferred to  at 2300. Nursing report given. Cell phone and glasses sent with patient.

## 2023-06-13 NOTE — PLAN OF CARE
OUTPATIENT/OBSERVATION GOALS TO BE MET BEFORE DISCHARGE     1. Diagnostic tests and consults completed and resulted: Stable blood glucose on medications. Troponin this a.m. 39.  2. Vital signs normal or at patient baseline: Met  3. Safe disposition plan has been identified: Partially met - patient will return to his skilled nursing facility, complete plan not yet in place.

## 2023-06-13 NOTE — DISCHARGE SUMMARY
[facility]  Discharge Summary - Medicine & Pediatrics       Date of Admission:  6/12/2023  Date of Discharge:  6/13/2023  Discharging Provider: Dr. Vincent  Discharge Service: Medicine Service, [facility] TEAM 3    Discharge Diagnoses    Primary:  #Hypoglycemia    Secondary:  #Diabetes Mellitus, type 2  #Recurrent falls  #Back pain, chronic  #Hypertension  #Microscopic Hematuria  #Dysuria  #Polyuria  #Chronic Diastolic Congestive Heart Failure  #CAD, s/p CABG 1999 & 2011   #Sick sinus syndrome s/p pacemaker  #Hx of NSTEMI (medically managed January 2023)  #PAD  #Tropinemia  #Pancytopenia  #Polypharmacy  #Chronic Kidney Disease stage III  #Dyslipidemia  #GERD  #Depression    Clinically Significant Risk Factors     # DMII: A1C = 9.9 % (Ref range: <5.7 %) within past 6 months       Follow-ups Needed After Discharge   Follow up with your primary care doctor, Dr. Gamino, within one week of leaving the hospital.    Problems that need follow up:    - Insulin regimen/diabetes management  - Antihypertensive medication management  - Microhematuria   - Pancytopenia  (PBS pending)    See hospital course below for details and recommendations.      Unresulted Labs Ordered in the Past 30 Days of this Admission     Date and Time Order Name Status Description    6/12/2023  5:00 PM Bld morphology pathology review In process       These results will be followed up by PCP.    Discharge Disposition   Discharged to long-term care facility  Condition at discharge: Stable    Hospital Course   Barak Berry is a 89 year old male with past medical history significant for T2DM, HTN, CKD stage III, NSTEMI, CAD (s/p CABG 1999, 2011), chronic HFpEF, sick sinus syndrome (s/p pacemaker), PAD, dyslipidemia, and GERD who was admitted for observation for hypoglycemia after being unresponsive in bed at his SNF with a BG of 29. The following problems were addressed during his  hospitalization:    #Hypoglycemia, resolved  #Diabetes Mellitus, type 2  Pt was BIBA after he was found unresponsive in bed at his skilled nursing facility (SNF) with a blood glucose of 29. Per chart review, his night time glargine was recently increased from 16 units to 20 units. He was given D10 IVF en route, his BG on admission is now 146. The pt does not recall anything leading up to when the paramedics arrived, but he denies falling or hitting his head. He denies feeling any fevers, chills, dizziness, or lightheadedness leading up to when he was found down. He is alert and oriented to person and place, but not time (baseline). His SNF administers all of his medications for him. Hgb A1c is 9.9%. His BG has remained stable ~140s while admitted, he received glargine 8u at bedtime and tolerated well in addition to sliding scale insulin and metformin while admitted. Suspect etiology of his less responsive episode was hypoglycemia in setting of multiple hypoglycemic agents in pt w/ labile blood glucoses.  Discharge plan:  - Discontinuing pta pioglitazone at discharge, would recommend not restarting this due to hypoglycemic risk  - Consider starting pt on SGLT2 inhibitor in setting of T2DM, HFpEF, and proteinuric CKD  - Discharging on 10u glargine insulin at night time (previuosly on this dosing prior to recent uptitrations), continuing on prior silding scale regimen  - Continuing prior Metformin 1000mg qday  - Recommend considering if this pt would be a good candidate for continuous glucose monitoring due to recent hyperglycemic and hypoglycemic episodes vs formal endocrinology following     #Chronic hypertension  /66 on admission, pt likely did not take pta hypertension medications morning of admission. Received pta coreg, lasix, and terazosin while admitted, held hydralazine.  BP on discharge 142/51. Low suspicion of orthostasis contributing to presentation given he was found less responsive in bed and not  with position changes.  Discharge plan:  - Discharging on pta coreg, lasix, and hydralazine    #Anemia and thrombocytopenia, chronic  Baseline anemia (Hgb 10-11) and baseline thrombocytopenia (118-185). On admission, new leukopenia (WBC 3.8), and slightly worsened thrombocytopenia (plts 105). Obtained peripheral blood smear, results have not come back at time of discharge. On day of discharge, leukopenia resolved (likely transient) and stable anemia and thrombocytopenia.  Discharge plan:  - PBS can be followed up by primary care    #Microscopic Hematuria  #Dysuria  #Polyuria  On admission, pt endorses urinary frequency (has to urinate every 30 minutes), urgency, and burning while urinating. UA significant for blood and RBCs, negative for WBCs and leukocyte esterase. Pt does not receive straight catheretization at SNF, so no obvious trauma. Former smoker, 45 pack year history. Dysuria symptoms have since resolved, would recommend repeat UA.  Discharge plan:  - Repeat UA at outpatient follow up:              - If microhematuria persists, workup for undiagnosed carcinoma of the urinary tract should be initiated (CTU, cytology, and cytoscopy)     #Recurrent falls  #Back pain, chronic  Admitted for two different mechanical falls in the last year, most recently admitted on 5/17 to [facility] with a mechanical fall w/ back pain. Pt was found unresposive in bed at SNF, last known well time sometime evening of 6/11. Pt ambulates via walker at SNF without assistance prior to recent fall, but has needed assistance walking and using bathroom in last moth. There is no evidence of trauma on head-to-toe trauma physical exam, neurological exam shows no focal deficits. Pt endorses back pain, although not changed from baseline. Pt denies falling or hitting his head. Continued pta acetaminophen and dilaudid while admitted.     #Chronic Diastolic Congestive Heart Failure  #CAD, s/p CABG 1999 & 2011   #Sick sinus syndrome s/p  pacemaker  #Hx of NSTEMI (medically managed January 2023)  #PAD  #Tropinemia  Mildly elevated troponin on admission (29). No cardiac symptoms and EKG showed atrial paced rhythm w/ RBBB which has been seen on previous EKGs. Reordered troponin, troponin was not further elevated and was not beyond gender specific normal range. Low suspicion for any cardiac involvement, pt was monitored via continuous cardiac monitoring and received pta clopidogrel, isosorbide mononitrate, aspirin while admitted.     #Polypharmacy  On many medications, as above. Consider changing/streamlining diabetes regimen and antihypertensive medications, recommendations above.    Consultations This Hospital Stay   CARE MANAGEMENT / SOCIAL WORK IP CONSULT    Code Status   No CPR- Do NOT Intubate       The patient was discussed with Dr. Vincent.    [facility] 3 TEAM, Medicine Service  [facility] [address] [address]  Phone: [phone]    Resident/Fellow Attestation   I, Dejan Richard MD, was present with the medical/DONNA student who participated in the service and in the documentation of the note.  I have verified the history and personally performed the physical exam and medical decision making.  I agree with the assessment and plan of care as documented in the note and edited by me.    Dejan Richard MD  PGY2  Date of Service (when I saw the patient): 06/13/23  ___________________________________________________________________    Physical Exam   Vital Signs: Temp: 98.1  F (36.7  C) Temp src: Oral BP: (!) 161/55 Pulse: 80   Resp: 16 SpO2: 96 % O2 Device: None (Room air)    Weight: 135 lbs 12.85 oz    General Appearance: Alert, lying in bed, appears stated age  HEENT: Atraumatic, pupils equal and reactive  Spine/back: Pt endorse lower back pain, but that is his baseline. Spine straight and nontender on palpation, no spinal deformities or step offs.  CV: RRR, no murmurs heard on  auscultation  Respiratory: No increased wob, respirations regular and unlabored. Lungs clear to auscultation bilaterally  Abdominal: Soft, nontender.Bruising from insulin injection noted.  GI/: Endorses increased frequency of urination, burning with urination, and dark color to urine. Bowel   Neuro: Pupils equal and reactive to light, EOM intact bilaterally. Symmetric facial expressions, upper extremity strength and sensation intact bilaterally, lower extremity strength and sensation intact bilaterally. Trapezius muscle strength intact bilaterally. Oriented to person and place, not oriented to time    Primary Care Physician   Burke Gamino    Discharge Orders   No discharge procedures on file.    Significant Results and Procedures   Most Recent 6 glucoses:Recent Labs   Lab Test 06/13/23  0755 06/13/23  0521 06/13/23  0122 06/12/23  2152 06/12/23  2032 06/12/23  1900   * 132* 250* 257* 234* 226*       Discharge Medications   Current Discharge Medication List      CONTINUE these medications which have NOT CHANGED    Details   !! acetaminophen (TYLENOL) 500 MG tablet Take 1,000 mg by mouth 3 times daily      !! acetaminophen (TYLENOL) 500 MG tablet Take 500 mg by mouth every 4 hours as needed AND 1 TAB PO Q4H PRN      aspirin 81 MG EC tablet [ASPIRIN 81 MG EC TABLET] Take 81 mg by mouth daily.      atorvastatin (LIPITOR) 40 MG tablet Take 40 mg by mouth every evening      carvedilol (COREG) 25 MG tablet Take 25 mg by mouth 2 times daily      citalopram (CELEXA) 10 MG tablet Take 20 mg by mouth daily      clopidogrel (PLAVIX) 75 MG tablet Take 1 tablet by mouth every morning      furosemide (LASIX) 20 MG tablet Take 20 mg by mouth      hydrALAZINE (APRESOLINE) 25 MG tablet Take 75 mg by mouth 2 times daily      HYDROmorphone (DILAUDID) 2 MG tablet Take 1 tablet (2 mg) by mouth every 8 hours as needed for pain or severe pain  Refills: 0    Associated Diagnoses: Closed compression fracture of L2 lumbar  vertebra with routine healing, subsequent encounter      insulin aspart (NOVOLOG VIAL) 100 UNITS/ML vial Inject Subcutaneous 3 times daily (with meals) Inject as per sliding scale: if 200 - 250 = 2 units ; 251 - 300  = 4 units; 301 - 350 = 6 units; 351 - 400 = 8 units; 401  - 450 = 10 units; 451+ = 12 units      insulin glargine (LANTUS PEN) 100 UNIT/ML pen Inject 20 Units Subcutaneous At Bedtime      !! isosorbide mononitrate (IMDUR) 30 MG 24 hr tablet Take 30 mg by mouth daily Give with 120 mg tab      !! isosorbide mononitrate CR (IMDUR) 120 MG 24 HR ER tablet Take 120 mg by mouth      metFORMIN (GLUCOPHAGE XR) 500 MG 24 hr tablet Take 1,000 mg by mouth daily (with dinner)      mirtazapine (REMERON) 7.5 MG tablet Take 7.5 mg by mouth At Bedtime      nitroglycerin (NITROSTAT) 0.4 MG SL tablet [NITROGLYCERIN (NITROSTAT) 0.4 MG SL TABLET] Place 0.4 mg under the tongue as needed.      ondansetron (ZOFRAN) 8 MG tablet Take 8 mg by mouth every 8 hours as needed for nausea      pantoprazole (PROTONIX) 40 MG EC tablet Take 40 mg by mouth daily      pioglitazone (ACTOS) 30 MG tablet Take 30 mg by mouth daily      polyethylene glycol (MIRALAX) 17 g packet Take 17 g by mouth daily as needed for constipation      senna-docusate (SENOKOT-S/PERICOLACE) 8.6-50 MG tablet Take 2 tablets by mouth 2 times daily      terazosin (HYTRIN) 10 MG capsule Take 1 capsule by mouth At Bedtime      vitamins  A,C,E-zinc-copper (ICAPS AREDS) 7,160-113-100 unit-mg-unit TbEC [VITAMINS  A,C,E-ZINC-COPPER (ICAPS AREDS) 7,160-113-100 UNIT-MG-UNIT TBEC] Take 1 capsule by mouth daily.       !! - Potential duplicate medications found. Please discuss with provider.        Allergies   Allergies   Allergen Reactions     Acetaminophen Nausea and Vomiting     Other reaction(s): Nausea/vomiting     Amlodipine Unknown     Cilostazol Unknown     Hydrocodone      Other reaction(s): Nausea/vomiting     Vicodin [Hydrocodone-Acetaminophen] Unknown

## 2023-06-13 NOTE — PLAN OF CARE
Pt is deemed Observation per Utilization Review. 'What is Observation?' information document given to pt. Pt had no questions at this time.     PRIMARY DIAGNOSIS: Hypoglycemia and elevated troponin    OUTPATIENT/OBSERVATION GOALS TO BE MET BEFORE DISCHARGE    1. Diagnostic tests and consults completed and resulted - stable blood glucose on medications- TBD  2. Vital signs normal or at patient baseline- YES  3. Safe disposition plan has been identified- YES

## 2023-06-13 NOTE — PLAN OF CARE
Admission          6/12/2023  7:14 AM  -----------------------------------------------------------  Reason for admission: Hypoglycemia and elevated troponin. Admitted to observation.     Observation goals:  -diagnostic tests and consults completed and resulted - stable blood glucose on medications   -vital signs normal or at patient baseline   -safe disposition plan has been identified     Primary team notified of pt arrival.  Admitted from: ED  Via: stretcher  Accompanied by: ED RN  Belongings: Placed in closet.   Admission Profile: complete  Teaching: orientation to unit and call light- call light within reach, call don't fall, use of console, meal times, when to call for the RN, and enforced importance of safety. Observation education completed.   Access: PIV x2  Telemetry:Placed on pt   Ht./Wt.: complete  Code Status verified on armband: yes, new code status wristband applied  2 RN Skin Assessment Completed By: Caitlyn CHACON RN and Nancy WILSON RN. Left cheek scab, L and R arm bruising, R shin abrasion, and blanchable redness on buttocks. Preventative mepilex applied and patient educated on shifting weight and frequent repositioning in bed.  Med Rec completed: yes  Suction/Ambu bag/Flowmeter at bedside: yes  Is patient having diarrhea upon admission- if YES fill out testing algorithm : no    C. Diff Testing Algorithm (MUST be marked YES)   1. 3 or more loose stools in 24 hrs. [] Yes [x] No       Additional symptoms:(At least ONE must be marked yes)   1. Abdominal pain/discomfort [] Yes [x] No   2. Fever at least 38C (100.4 F) [] Yes [x] No   3. Elevated WBC(>11,000) [] Yes [x] No       Exclusion Criteria:  (MUST be marked YES)   1. Off laxatives for at least 48 hrs. [x] Yes [] No       Pt status:  A&Ox4. Pleasant. HR 60s-70s, NSR. RA, denies dyspnea. Voiding spontaneously. L and R PIV x2.     Temp:  [97.5  F (36.4  C)-97.8  F (36.6  C)] 97.5  F (36.4  C)  Pulse:  [60-81] 71  Resp:  [18-20] 18  BP: (151-186)/(51-66)  152/51  SpO2:  [96 %-98 %] 96 %

## 2023-06-13 NOTE — PLAN OF CARE
Pt is deemed Observation per Utilization Review. 'What is Observation?' information document given to pt. Pt had no questions at this time.      PRIMARY DIAGNOSIS: Hypoglycemia and elevated troponin     OUTPATIENT/OBSERVATION GOALS TO BE MET BEFORE DISCHARGE     1. Diagnostic tests and consults completed and resulted - stable blood glucose on medications- TBD, labs pending.   2. Vital signs normal or at patient baseline- YES  3. Safe disposition plan has been identified- YES      Shift 11:30p-7a  Neuro: A&Ox4.   Cardiac: SR HR 60s-70s. VSS. Rarely A paced, unable to find pacemaker mode.   Respiratory: Sating >95% on RA. Spot checking O2 overnight.   GI/: Adequate urine output. BM x1 overnight.   Diet/appetite: Regular diet.   Activity:  Pt stated too weak to get out of bed on this shift and wanted to rest. Pt educated on repositioning and shifting weight in bed.   Pain: At acceptable level on current regimen. Pt denied pain overnight.   Skin: No new deficits noted.   LDA's: L and R PIV, SL.     Plan: Continue with POC. Notify primary team with changes.

## 2023-06-13 NOTE — PLAN OF CARE
OUTPATIENT/OBSERVATION GOALS TO BE MET BEFORE DISCHARGE     1. Diagnostic tests and consults completed and resulted: Met. Stable blood glucose on medications. Troponin this a.m. 39. No interventions.  2. Vital signs normal or at patient baseline: Met  3. Safe disposition plan has been identified: Met. Son picking patient up at 1300 and bringing to care facility.    Patient picked up by son at about 1330. Back brace applied to patient before standing and getting into wheelchair. Patient brought to front door in WC by writer,helped into car & seatbelt applied.  Discharge/transfer packet sent with patient & son.

## 2023-06-14 NOTE — PROGRESS NOTES
[facility] Care [facility]    Background: Transitional Care Management program identified per system criteria and reviewed by [facility] team for possible outreach.    Assessment: Upon chart review, [facility] Team member will not proceed with patient outreach related to this episode of Transitional Care Management program due to reason below:    Patient has discharged to a Memory Care, Long-term Care, Assisted Living or Group Home where patient is receiving on-site support with their daily cares, including support with hospital follow up plan.    Plan: Transitional Care Management episode addressed appropriately per reason noted above.      Iraida Poole MA  Connected Care Resource Center, [facility]    *Connected Care Resource Team does NOT follow patient ongoing. Referrals are identified based on internal discharge reports and the outreach is to ensure patient has an understanding of their discharge instructions.

## 2023-06-15 PROBLEM — E16.2 HYPOGLYCEMIA: Status: RESOLVED | Noted: 2023-01-01 | Resolved: 2023-01-01

## 2023-06-15 NOTE — PROGRESS NOTES
[facility] GERIATRICS    PRIMARY CARE PROVIDER AND CLINIC:  PAULINA Manjarrez CNP, [address] / [place] [address]  Chief Complaint   Patient presents with     Hospital /U      [facility] Record Number:  [id number]  Place of Service where encounter took place:  [facility] ([facility]) [[id number]]    Barak Berry  is a 89 year old former ,  (1932), with pmhx including recent NSTEMI, prior CABG (2x), HFpEF, PAD, T2DM, falls   returned to the above facility from  [facility]. Hospital stay 23 - 23. .     HPI:      He is known to me from admission to the TCU and board and care.  I last saw him on 2023 after he been hospitalized after a fall at which time he was found to have new moderate L2 inferior plate compression fracture.    Seen today following admission related to hypoglycemia.  He was sent to the hospital earlier this week due to significant hypoglycemia with a blood glucose of 29 when checked.  Notably, blood sugar was 290 prior evening. Blood sugars normalized with D10 on admission.  No other findings to suggest additional cause such as infection.  At discharge his pioglitazone was discontinued, Lantus was decreased to 10 units.  Metformin daily was continued.    Of note, when last seen on 2023, was continued to have significant episodes of hyperglycemia.  Had increased Lantus from 16 units to 20 units nightly.  Review of blood sugars over the week since without evidence of hypoglycemia and ongoing, though more favorable, hyperglycemia.  Reviewed with staff shows that he was eating adequately prior to his admission.  Did note that he did receive significant doses of sliding scale insulin however.  Had been changing Lantus and added additional oral medications to decrease need for sliding scale.    Today    Today, he is doing well.  Says his appetite is good.  He is also not  sure why his blood sugar dropped.  Says he was eating well prior to his admission.  Had no nausea or vomiting.  No infectious symptoms.  He believes he fell when his blood sugar was really low and has some bruising of his left elbow, but again otherwise is feeling okay.    He is watching Star Trek and notes he is a little bit of a Trekkie.  He does note the most important thing to him is getting his strength back so he can do normal things.  He wants to be able to move at least to the point of getting rid of pain.  He has pain in his back due to being unable to sit up or ambulate frequently.    Discussed some medication modifications and reviewed he has a history of an allergy to amlodipine but he does not recall what this is.  Not sure if he had leg swelling with it.    CODE STATUS/ADVANCE DIRECTIVES DISCUSSION:  No CPR- Do NOT Intubate  DNR / DNI  ALLERGIES:   Allergies   Allergen Reactions     Acetaminophen Nausea and Vomiting     Other reaction(s): Nausea/vomiting     Amlodipine Unknown     Cilostazol Unknown     Hydrocodone      Other reaction(s): Nausea/vomiting     Vicodin [Hydrocodone-Acetaminophen] Unknown      PAST MEDICAL HISTORY:   Past Medical History:   Diagnosis Date     CAD (coronary artery disease)     s/p CABG 1999, 2011     Chronic diastolic congestive heart failure (H)      CKD (chronic kidney disease) stage 3, GFR 30-59 ml/min (H)      Diabetes mellitus, type 2 (H)      Dyslipidemia      Gastroesophageal reflux disease      Hypertension      PAD (peripheral artery disease) (H)      Recurrent falls      Sick sinus syndrome (H)     s/p pacemaker     Thrombocytopenia (H)       PAST SURGICAL HISTORY:   has a past surgical history that includes CABG, VEIN, SINGLE; CORONARY STENT PERCUT, INITIAL VESSEL; appendectomy; Arthroscopy knee (Left); Total Knee Arthroplasty (Left); Lumbar Laminectomy; Tonsillectomy; joint replacement; Cardiac surgery; and Colonoscopy w/wo Brush **Performed** (N/A,  1/3/2019).  FAMILY HISTORY: family history is not on file.  SOCIAL HISTORY:   reports that he quit smoking about 62 years ago. He has a 45.00 pack-year smoking history. He has never used smokeless tobacco. He reports that he does not currently use alcohol. He reports that he does not use drugs.  Patient's living condition: lives in a nursing home    Post Discharge Medication Reconciliation Status:   MED REC REQUIRED  Post Medication Reconciliation Status: discharge medications reconciled and changed, per note/orders       Current Outpatient Medications   Medication Sig     acetaminophen (TYLENOL) 500 MG tablet Take 1,000 mg by mouth 3 times daily     acetaminophen (TYLENOL) 500 MG tablet Take 500 mg by mouth every 4 hours as needed AND 1 TAB PO Q4H PRN     aspirin 81 MG EC tablet [ASPIRIN 81 MG EC TABLET] Take 81 mg by mouth daily.     atorvastatin (LIPITOR) 40 MG tablet Take 40 mg by mouth every evening     carvedilol (COREG) 25 MG tablet Take 25 mg by mouth 2 times daily     citalopram (CELEXA) 10 MG tablet Take 20 mg by mouth daily     clopidogrel (PLAVIX) 75 MG tablet Take 1 tablet by mouth every morning     furosemide (LASIX) 20 MG tablet Take 20 mg by mouth     hydrALAZINE (APRESOLINE) 25 MG tablet Take 75 mg by mouth 2 times daily     HYDROmorphone (DILAUDID) 2 MG tablet Take 1 tablet (2 mg) by mouth every 8 hours as needed for pain or severe pain     insulin aspart (NOVOLOG VIAL) 100 UNITS/ML vial Inject Subcutaneous 3 times daily (with meals) Inject as per sliding scale: if 200 - 250 = 2 units ; 251 - 300  = 4 units; 301 - 350 = 6 units; 351 - 400 = 8 units; 401  - 450 = 10 units; 451+ = 12 units     insulin glargine (LANTUS PEN) 100 UNIT/ML pen Inject 10 Units Subcutaneous At Bedtime     isosorbide mononitrate (IMDUR) 30 MG 24 hr tablet Take 30 mg by mouth daily Give with 120 mg tab     isosorbide mononitrate CR (IMDUR) 120 MG 24 HR ER tablet Take 120 mg by mouth     metFORMIN (GLUCOPHAGE XR) 500 MG 24 hr  "tablet Take 1,000 mg by mouth daily (with dinner)     mirtazapine (REMERON) 7.5 MG tablet Take 7.5 mg by mouth At Bedtime     nitroglycerin (NITROSTAT) 0.4 MG SL tablet [NITROGLYCERIN (NITROSTAT) 0.4 MG SL TABLET] Place 0.4 mg under the tongue as needed.     ondansetron (ZOFRAN) 8 MG tablet Take 8 mg by mouth every 8 hours as needed for nausea     pantoprazole (PROTONIX) 40 MG EC tablet Take 40 mg by mouth daily     polyethylene glycol (MIRALAX) 17 g packet Take 17 g by mouth daily as needed for constipation     senna-docusate (SENOKOT-S/PERICOLACE) 8.6-50 MG tablet Take 2 tablets by mouth 2 times daily     terazosin (HYTRIN) 10 MG capsule Take 1 capsule by mouth At Bedtime     vitamins  A,C,E-zinc-copper (ICAPS AREDS) 7,160-113-100 unit-mg-unit TbEC [VITAMINS  A,C,E-ZINC-COPPER (ICAPS AREDS) 7,160-113-100 UNIT-MG-UNIT TBEC] Take 1 capsule by mouth daily.     No current facility-administered medications for this visit.     Vitals:  BP (!) 149/60   Pulse 83   Temp 98.6  F (37  C)   Resp 16   Ht 1.702 m (5' 7\")   Wt 60.2 kg (132 lb 12.8 oz)   SpO2 94%   BMI 20.80 kg/m    Exam:    GENERAL APPEARANCE: Laying in bed comfortably, NAD  HENT:  Mild temporal and periorbital atrophy, mildly [place], reasonable dentition  EYES:  Conjunctiva clear, anicteric, EOMI, PERRL  PULM  Normal WOB on RA, lungs CTAB, no wheezes or crackles  CV:  RRR, S1/S2 normal, no murmurs;  Trace LE edema  ABDOMEN: Abdomen soft, not tender, not distended, BS normal and active throughout   SKIN:  Healling ecchymosis of left elbow  NEURO: Alert, answering questions appropriately, normal thought processes; CN II-XII grossly intact,   PSYCH:  Mood normal with congruent affect    Lab/Diagnostic data:  Recent labs in [facility] reviewed by me today.     BGs leading up to admission      ASSESSMENT/PLAN:    (R54) Frailty  (primary encounter diagnosis)  (R53.81) Physical deconditioning  (E44.0) Moderate protein-calorie malnutrition (H)  Comment: Clinical frailty " scale of approximately 7.  Multiple recent admissions.  Does have significant homeostatic vulnerability.  At risk for poor outcomes.  Most important to him is activity so that he is not in pain and will prioritize treatments accordingly.  Plan:   -PT eval and treat for back pain and mobility    (E11.65,  Z79.4) Type 2 diabetes mellitus with hyperglycemia, with long-term current use of insulin (H)  (E16.2) Hypoglycemia  Comment: Significant episode of hypoglycemia with blood sugar of 29 leading to hospitalization.  Etiology unclear as blood sugars were within normal or hyperglycemic range even after change of Lantus.  Some concern for overuse of sliding scale.  Was discharged with decreased dose of Lantus at 10 units.  Pioglitazone was discontinued due to concern for hypoglycemic risk (however, this is not generally associated with significant hypoglycemia, was considering discontinuing though related to history of heart failure).  Additionally sliding scale was continued.  Additionally, some consideration for SGLTi, though again this is a hypoglycemic agent.  Plan:   -Increase Lantus to 11 units daily  -Discontinue sliding scale insulin, do not resume  -Continue metformin as previous  -Avoid dietary limitations    (Z79.899) Polypharmacy  Comment: On multiple potentially inappropriate medications.  Additionally, potentially some unknown interaction leading to significant hypoglycemia.  Will modify to decrease risk and to prioritize his goals.  Plan:   -Discontinue terazosin, start tamsulosin 0.4 mg daily  -Decrease hydralazine to 50 mg twice daily, Imdur to 120 mg daily.  Continue to slowly titrate off.  Consider amlodipine for antianginal effects if requires further BP effect as well    (M80.08XD) Age-related osteoporosis with current pathological fracture, vertebra(e), subsequent encounter for fracture with routine healing  Comment: Admitted recently after a fall with new L2 compression as well as history of  chronic L1 compression.  Consistent with osteoporosis.  Will be undergoing DEXA scan 6/16/2023 when he goes for x-rays related to neurosurgery follow-up as well.  Plan:   -Consideration for starting bisphosphonate therapy    (I25.810) Coronary artery disease involving coronary bypass graft of native heart without angina pectoris  (I25.2) History of non-ST elevation myocardial infarction (NSTEMI)  (I73.9) PAD (peripheral artery disease) (H)  Comment: Continues on aspirin, clopidogrel, and atorvastatin.  We will review need for ongoing dual antiplatelet therapy (possibly related to PAD primarily).  Currently with Imdur/hydralazine for heart failure and antianginal effects, changes as above.    Orders:  [x] Discontinue sliding scale insulin, lantus to 11  [x] Terazosin --> Tamsulosin 0.4  [x] Decrease hydral/imdur to 50BID/120  [x] PT for back, mobility  [x] Review DAPT recs    Electronically signed by:    Benjamin Rosenstein, MD, MA  [facility]    This note was completed with the assistance of dictation software. Typos and word substitution-errors are expected and unintended.      47 MINUTES SPENT BY ME on the date of service doing chart review, history, exam, documentation & further activities per the note.

## 2023-06-15 NOTE — LETTER
6/15/2023        RE: Barak Berry  [address] [place] [address]        [facility] GERIATRICS    PRIMARY CARE PROVIDER AND CLINIC:  PAULINA Manjarrez CNP, [address] [facility] [address] [facility] / [place] [address]  Chief Complaint   Patient presents with     Hospital F/U      [facility] Medical Record Number:  [id number]  Place of Service where encounter took place:  [facility] () [[id number]]    Barak Berry  is a 89 year old former , olimpia (1932), with pmhx including recent NSTEMI, prior CABG (2x), HFpEF, PAD, T2DM, falls   returned to the above facility from  [facility]. Hospital stay 23 - 23. .     HPI:      He is known to me from admission to the TCU and board and care.  I last saw him on 2023 after he been hospitalized after a fall at which time he was found to have new moderate L2 inferior plate compression fracture.    Seen today following admission related to hypoglycemia.  He was sent to the hospital earlier this week due to significant hypoglycemia with a blood glucose of 29 when checked.  Notably, blood sugar was 290 prior evening. Blood sugars normalized with D10 on admission.  No other findings to suggest additional cause such as infection.  At discharge his pioglitazone was discontinued, Lantus was decreased to 10 units.  Metformin daily was continued.    Of note, when last seen on 2023, was continued to have significant episodes of hyperglycemia.  Had increased Lantus from 16 units to 20 units nightly.  Review of blood sugars over the week since without evidence of hypoglycemia and ongoing, though more favorable, hyperglycemia.  Reviewed with staff shows that he was eating adequately prior to his admission.  Did note that he did receive significant doses of sliding scale insulin however.  Had been changing Lantus and added additional oral medications to decrease need for sliding  scale.    Today    Today, he is doing well.  Says his appetite is good.  He is also not sure why his blood sugar dropped.  Says he was eating well prior to his admission.  Had no nausea or vomiting.  No infectious symptoms.  He believes he fell when his blood sugar was really low and has some bruising of his left elbow, but again otherwise is feeling okay.    He is watching Star Trek and notes he is a little bit of a Trekkie.  He does note the most important thing to him is getting his strength back so he can do normal things.  He wants to be able to move at least to the point of getting rid of pain.  He has pain in his back due to being unable to sit up or ambulate frequently.    Discussed some medication modifications and reviewed he has a history of an allergy to amlodipine but he does not recall what this is.  Not sure if he had leg swelling with it.    CODE STATUS/ADVANCE DIRECTIVES DISCUSSION:  No CPR- Do NOT Intubate  DNR / DNI  ALLERGIES:   Allergies   Allergen Reactions     Acetaminophen Nausea and Vomiting     Other reaction(s): Nausea/vomiting     Amlodipine Unknown     Cilostazol Unknown     Hydrocodone      Other reaction(s): Nausea/vomiting     Vicodin [Hydrocodone-Acetaminophen] Unknown      PAST MEDICAL HISTORY:   Past Medical History:   Diagnosis Date     CAD (coronary artery disease)     s/p CABG 1999, 2011     Chronic diastolic congestive heart failure (H)      CKD (chronic kidney disease) stage 3, GFR 30-59 ml/min (H)      Diabetes mellitus, type 2 (H)      Dyslipidemia      Gastroesophageal reflux disease      Hypertension      PAD (peripheral artery disease) (H)      Recurrent falls      Sick sinus syndrome (H)     s/p pacemaker     Thrombocytopenia (H)       PAST SURGICAL HISTORY:   has a past surgical history that includes CABG, VEIN, SINGLE; CORONARY STENT PERCUT, INITIAL VESSEL; appendectomy; Arthroscopy knee (Left); Total Knee Arthroplasty (Left); Lumbar Laminectomy; Tonsillectomy; joint  replacement; Cardiac surgery; and Colonoscopy w/wo Brush **Performed** (N/A, 1/3/2019).  FAMILY HISTORY: family history is not on file.  SOCIAL HISTORY:   reports that he quit smoking about 62 years ago. He has a 45.00 pack-year smoking history. He has never used smokeless tobacco. He reports that he does not currently use alcohol. He reports that he does not use drugs.  Patient's living condition: lives in a nursing home    Post Discharge Medication Reconciliation Status:   MED REC REQUIRED  Post Medication Reconciliation Status: discharge medications reconciled and changed, per note/orders       Current Outpatient Medications   Medication Sig     acetaminophen (TYLENOL) 500 MG tablet Take 1,000 mg by mouth 3 times daily     acetaminophen (TYLENOL) 500 MG tablet Take 500 mg by mouth every 4 hours as needed AND 1 TAB PO Q4H PRN     aspirin 81 MG EC tablet [ASPIRIN 81 MG EC TABLET] Take 81 mg by mouth daily.     atorvastatin (LIPITOR) 40 MG tablet Take 40 mg by mouth every evening     carvedilol (COREG) 25 MG tablet Take 25 mg by mouth 2 times daily     citalopram (CELEXA) 10 MG tablet Take 20 mg by mouth daily     clopidogrel (PLAVIX) 75 MG tablet Take 1 tablet by mouth every morning     furosemide (LASIX) 20 MG tablet Take 20 mg by mouth     hydrALAZINE (APRESOLINE) 25 MG tablet Take 75 mg by mouth 2 times daily     HYDROmorphone (DILAUDID) 2 MG tablet Take 1 tablet (2 mg) by mouth every 8 hours as needed for pain or severe pain     insulin aspart (NOVOLOG VIAL) 100 UNITS/ML vial Inject Subcutaneous 3 times daily (with meals) Inject as per sliding scale: if 200 - 250 = 2 units ; 251 - 300  = 4 units; 301 - 350 = 6 units; 351 - 400 = 8 units; 401  - 450 = 10 units; 451+ = 12 units     insulin glargine (LANTUS PEN) 100 UNIT/ML pen Inject 10 Units Subcutaneous At Bedtime     isosorbide mononitrate (IMDUR) 30 MG 24 hr tablet Take 30 mg by mouth daily Give with 120 mg tab     isosorbide mononitrate CR (IMDUR) 120 MG 24  "HR ER tablet Take 120 mg by mouth     metFORMIN (GLUCOPHAGE XR) 500 MG 24 hr tablet Take 1,000 mg by mouth daily (with dinner)     mirtazapine (REMERON) 7.5 MG tablet Take 7.5 mg by mouth At Bedtime     nitroglycerin (NITROSTAT) 0.4 MG SL tablet [NITROGLYCERIN (NITROSTAT) 0.4 MG SL TABLET] Place 0.4 mg under the tongue as needed.     ondansetron (ZOFRAN) 8 MG tablet Take 8 mg by mouth every 8 hours as needed for nausea     pantoprazole (PROTONIX) 40 MG EC tablet Take 40 mg by mouth daily     polyethylene glycol (MIRALAX) 17 g packet Take 17 g by mouth daily as needed for constipation     senna-docusate (SENOKOT-S/PERICOLACE) 8.6-50 MG tablet Take 2 tablets by mouth 2 times daily     terazosin (HYTRIN) 10 MG capsule Take 1 capsule by mouth At Bedtime     vitamins  A,C,E-zinc-copper (ICAPS AREDS) 7,160-113-100 unit-mg-unit TbEC [VITAMINS  A,C,E-ZINC-COPPER (ICAPS AREDS) 7,160-113-100 UNIT-MG-UNIT TBEC] Take 1 capsule by mouth daily.     No current facility-administered medications for this visit.     Vitals:  BP (!) 149/60   Pulse 83   Temp 98.6  F (37  C)   Resp 16   Ht 1.702 m (5' 7\")   Wt 60.2 kg (132 lb 12.8 oz)   SpO2 94%   BMI 20.80 kg/m    Exam:    GENERAL APPEARANCE: Laying in bed comfortably, NAD  HENT:  Mild temporal and periorbital atrophy, mildly [place], reasonable dentition  EYES:  Conjunctiva clear, anicteric, EOMI, PERRL  PULM  Normal WOB on RA, lungs CTAB, no wheezes or crackles  CV:  RRR, S1/S2 normal, no murmurs;  Trace LE edema  ABDOMEN: Abdomen soft, not tender, not distended, BS normal and active throughout   SKIN:  Healling ecchymosis of left elbow  NEURO: Alert, answering questions appropriately, normal thought processes; CN II-XII grossly intact,   PSYCH:  Mood normal with congruent affect    Lab/Diagnostic data:  Recent labs in [facility] reviewed by me today.     BGs leading up to admission      ASSESSMENT/PLAN:    (R54) Frailty  (primary encounter diagnosis)  (R53.81) Physical " deconditioning  (E44.0) Moderate protein-calorie malnutrition (H)  Comment: Clinical frailty scale of approximately 7.  Multiple recent admissions.  Does have significant homeostatic vulnerability.  At risk for poor outcomes.  Most important to him is activity so that he is not in pain and will prioritize treatments accordingly.  Plan:   -PT eval and treat for back pain and mobility    (E11.65,  Z79.4) Type 2 diabetes mellitus with hyperglycemia, with long-term current use of insulin (H)  (E16.2) Hypoglycemia  Comment: Significant episode of hypoglycemia with blood sugar of 29 leading to hospitalization.  Etiology unclear as blood sugars were within normal or hyperglycemic range even after change of Lantus.  Some concern for overuse of sliding scale.  Was discharged with decreased dose of Lantus at 10 units.  Pioglitazone was discontinued due to concern for hypoglycemic risk (however, this is not generally associated with significant hypoglycemia, was considering discontinuing though related to history of heart failure).  Additionally sliding scale was continued.  Additionally, some consideration for SGLTi, though again this is a hypoglycemic agent.  Plan:   -Increase Lantus to 11 units daily  -Discontinue sliding scale insulin, do not resume  -Continue metformin as previous  -Avoid dietary limitations    (Z79.899) Polypharmacy  Comment: On multiple potentially inappropriate medications.  Additionally, potentially some unknown interaction leading to significant hypoglycemia.  Will modify to decrease risk and to prioritize his goals.  Plan:   -Discontinue terazosin, start tamsulosin 0.4 mg daily  -Decrease hydralazine to 50 mg twice daily, Imdur to 120 mg daily.  Continue to slowly titrate off.  Consider amlodipine for antianginal effects if requires further BP effect as well    (M80.08XD) Age-related osteoporosis with current pathological fracture, vertebra(e), subsequent encounter for fracture with routine  healing  Comment: Admitted recently after a fall with new L2 compression as well as history of chronic L1 compression.  Consistent with osteoporosis.  Will be undergoing DEXA scan 6/16/2023 when he goes for x-rays related to neurosurgery follow-up as well.  Plan:   -Consideration for starting bisphosphonate therapy    (I25.810) Coronary artery disease involving coronary bypass graft of native heart without angina pectoris  (I25.2) History of non-ST elevation myocardial infarction (NSTEMI)  (I73.9) PAD (peripheral artery disease) (H)  Comment: Continues on aspirin, clopidogrel, and atorvastatin.  We will review need for ongoing dual antiplatelet therapy (possibly related to PAD primarily).  Currently with Imdur/hydralazine for heart failure and antianginal effects, changes as above.    Orders:  [x] Discontinue sliding scale insulin, lantus to 11  [x] Terazosin --> Tamsulosin 0.4  [x] Decrease hydral/imdur to 50BID/120  [x] PT for back, mobility  [x] Review DAPT recs    Electronically signed by:    Benjamin Rosenstein, MD, MA  [facility]    This note was completed with the assistance of dictation software. Typos and word substitution-errors are expected and unintended.      47 MINUTES SPENT BY ME on the date of service doing chart review, history, exam, documentation & further activities per the note.                   Sincerely,        Benjamin Rosenstein, MD

## 2023-07-14 NOTE — TELEPHONE ENCOUNTER
[facility] Geriatrics Lab Note     Provider: Rosenstein, Benjamin MD  Facility: [facility]  Facility Type:  LTC    Allergies   Allergen Reactions     Acetaminophen Nausea and Vomiting     Other reaction(s): Nausea/vomiting     Amlodipine Unknown     Cilostazol Unknown     Hydrocodone      Other reaction(s): Nausea/vomiting     Vicodin [Hydrocodone-Acetaminophen] Unknown       Labs Reviewed by provider: Mag and BMP     Verbal Order/Direction given by Provider:   1. Start magnesium-chloride 535mg delayed-release 1 tab po every day.  2. Decrease pantoprazole to 20mg po every day.   3. Recheck his magneisum in 1 month.   4. Start amlodipine 5mg po every day for HTN  Provider giving Order:  Rosenstein, Benjamin MD    Verbal Order given to: Ken Dueñas RN

## 2023-07-17 PROBLEM — F43.9 SITUATIONAL STRESS: Status: ACTIVE | Noted: 2023-01-01

## 2023-07-17 PROBLEM — F33.42 MAJOR DEPRESSIVE DISORDER, RECURRENT, IN FULL REMISSION (H): Status: ACTIVE | Noted: 2023-01-31

## 2023-07-17 PROBLEM — I35.0 AORTIC STENOSIS, MODERATE: Status: ACTIVE | Noted: 2023-01-01

## 2023-07-17 PROBLEM — D69.6 THROMBOCYTOPENIA, UNSPECIFIED (H): Status: ACTIVE | Noted: 2023-01-31

## 2023-07-17 PROBLEM — D75.89 MACROCYTOSIS: Status: ACTIVE | Noted: 2023-01-01

## 2023-07-17 PROBLEM — R26.9 UNSPECIFIED ABNORMALITIES OF GAIT AND MOBILITY: Status: ACTIVE | Noted: 2023-01-31

## 2023-07-17 PROBLEM — M62.81 MUSCLE WEAKNESS (GENERALIZED): Status: ACTIVE | Noted: 2023-01-31

## 2023-07-17 PROBLEM — E11.22 TYPE 2 DIABETES MELLITUS WITH CHRONIC KIDNEY DISEASE (H): Status: ACTIVE | Noted: 2023-01-31

## 2023-07-17 PROBLEM — S32.020A CLOSED COMPRESSION FRACTURE OF L2 LUMBAR VERTEBRA, INITIAL ENCOUNTER (H): Status: ACTIVE | Noted: 2023-01-01

## 2023-07-17 PROBLEM — I10 ESSENTIAL (PRIMARY) HYPERTENSION: Status: ACTIVE | Noted: 2023-01-31

## 2023-07-17 PROBLEM — N18.30 CHRONIC KIDNEY DISEASE, STAGE 3 UNSPECIFIED (H): Status: ACTIVE | Noted: 2023-01-31

## 2023-07-17 PROBLEM — K29.90 GASTRODUODENITIS: Status: ACTIVE | Noted: 2023-01-01

## 2023-07-17 PROBLEM — R15.9 INCONTINENCE OF FECES, UNSPECIFIED FECAL INCONTINENCE TYPE: Status: ACTIVE | Noted: 2023-01-01

## 2023-07-17 PROBLEM — R29.6 REPEATED FALLS: Status: ACTIVE | Noted: 2023-01-31

## 2023-07-17 PROBLEM — I25.700 CORONARY ARTERY DISEASE INVOLVING CORONARY BYPASS GRAFT OF NATIVE HEART WITH UNSTABLE ANGINA PECTORIS (H): Status: ACTIVE | Noted: 2017-05-24

## 2023-07-17 PROBLEM — E78.5 HYPERLIPIDEMIA, UNSPECIFIED HYPERLIPIDEMIA TYPE: Status: ACTIVE | Noted: 2023-01-31

## 2023-07-17 PROBLEM — Z91.81 HISTORY OF FALLING: Status: ACTIVE | Noted: 2023-04-03

## 2023-07-17 PROBLEM — R62.7 ADULT FAILURE TO THRIVE: Status: ACTIVE | Noted: 2023-01-31

## 2023-07-17 PROBLEM — S09.90XD UNSPECIFIED INJURY OF HEAD, SUBSEQUENT ENCOUNTER: Status: ACTIVE | Noted: 2023-01-31
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
## 2023-07-17 PROBLEM — R26.2 DIFFICULTY IN WALKING, NOT ELSEWHERE CLASSIFIED: Status: ACTIVE | Noted: 2023-01-31

## 2023-07-17 PROBLEM — A04.72 ENTEROCOLITIS DUE TO CLOSTRIDIUM DIFFICILE, NOT SPECIFIED AS RECURRENT: Status: ACTIVE | Noted: 2023-01-31

## 2023-07-17 PROBLEM — E11.649 HYPOGLYCEMIA ASSOCIATED WITH TYPE 2 DIABETES MELLITUS (H): Status: ACTIVE | Noted: 2023-01-01

## 2023-07-17 PROBLEM — E46 PROTEIN-CALORIE MALNUTRITION (H): Status: ACTIVE | Noted: 2023-02-01

## 2023-07-17 PROBLEM — I13.10 HEART DISEASE AND KIDNEY FAILURE DUE TO HIGH BLOOD PRESSURE: Status: ACTIVE | Noted: 2023-01-01

## 2023-07-17 PROBLEM — H35.30 MACULAR DEGENERATION (SENILE) OF RETINA: Status: ACTIVE | Noted: 2023-01-01

## 2023-07-17 PROBLEM — K21.9 GASTRO-ESOPHAGEAL REFLUX DISEASE WITHOUT ESOPHAGITIS: Status: ACTIVE | Noted: 2023-01-31

## 2023-07-17 NOTE — LETTER
7/17/2023        RE: Barak Berry  [address] [place] [address]        [facility] GERIATRICS  Chief Complaint   Patient presents with     RECHECK     [facility] Medical Record Number:  [id number]  Place of Service where encounter took place:  [facility] () [[id number]]    HPI:    Barak Berry  is 89 year old (1/6/1932) with pmhx including recent NSTEMI, prior CABG (2x), HFpEF, PAD, T2DM, falls, who is being seen today for a federally mandated E/M visit. Today's concerns are:     Hypomagnesemia  Coronary artery disease involving coronary bypass graft of native heart without angina pectoris  History of non-ST elevation myocardial infarction (NSTEMI)  Essential (primary) hypertension  Type 2 diabetes mellitus with hyperglycemia, with long-term current use of insulin (H)  Closed compression fracture of L2 lumbar vertebra with routine healing, subsequent encounter  Pain  Age-related osteoporosis with current pathological fracture, vertebra(e), subsequent encounter for fracture with routine healing    Today    Pt seen in his room as he was getting ready for lunch. He is wearing a [employer] shirt, and reports he loves watching [employer]ock[employer] and football. He was seen standing and moving around room with his TLSO brace on. He reports he continues to have back pain which bothers him. He saw neurosurgery on 6/16, and imaging revealed worsening L4 height loss as well as unchanged chronic L1 compression deformity, and interval progression of height loss corresponding L2 inferior endplate compression fracture since 5/17/23. Back pain is helped by Dilaudid 2 mg PRN. Otherwise, he is feeling good today. He reports regular BMs today, but he does report he had diarrhea 4-5 episodes 2 days ago.    We discussed his recent low Magnesium level of 1.1 and how Protonix medication could be contributing. Protonix switched to every other day by my colleague. Pt denies symptoms of GERD, and he  does not recall past GI bleed though GI bleed history shows up in chart review. He is on Plavix and Aspirin with his history of NSTEMI and CABG (x2). We discussed how reducing protonix while taking aspirin and plavix could increase risk of GI bleeding, but pronotix could also be causing hypomagnesia. He was comfortable reducing protonix dosing to every other day and continuing to monitor for signs of GI bleed.     We also discussed his varying blood sugars. He reports his appetite has been normal. He did have episode of hypoglycemia which prompted hospitalization, but blood sugars have been as high as 355. We talked about starting SGLT2 to help with blood sugar control and discussed side effects of increased urination, low blood sugar, and possible weight loss. He would like to try oral medication.    ALLERGIES:Cilostazol, Acetaminophen, Amlodipine, Hydrocodone, and Vicodin [hydrocodone-acetaminophen]  PAST MEDICAL HISTORY:   Past Medical History:   Diagnosis Date     CAD (coronary artery disease)     s/p CABG 1999, 2011     Chronic diastolic congestive heart failure (H)      CKD (chronic kidney disease) stage 3, GFR 30-59 ml/min (H)      Diabetes mellitus, type 2 (H)      Dyslipidemia      Gastroesophageal reflux disease      Hypertension      PAD (peripheral artery disease) (H)      Recurrent falls      Sick sinus syndrome (H)     s/p pacemaker     Thrombocytopenia (H)      PAST SURGICAL HISTORY:   has a past surgical history that includes CABG, VEIN, SINGLE; CORONARY STENT PERCUT, INITIAL VESSEL; appendectomy; Arthroscopy knee (Left); Total Knee Arthroplasty (Left); Lumbar Laminectomy; Tonsillectomy; joint replacement; Cardiac surgery; and Colonoscopy w/wo Brush **Performed** (N/A, 1/3/2019).  FAMILY HISTORY: family history is not on file.  SOCIAL HISTORY:  reports that he quit smoking about 62 years ago. He has a 45.00 pack-year smoking history. He has never used smokeless tobacco. He reports that he does not  currently use alcohol. He reports that he does not use drugs.    MEDICATIONS:  MED REC REQUIRED{Post Medication Reconciliation Status: discharge medications reconciled and changed, per note/orders         Review of your medicines          Accurate as of July 17, 2023 11:59 PM. If you have any questions, ask your nurse or doctor.            CONTINUE these medicines which have NOT CHANGED      Dose / Directions   * acetaminophen 500 MG tablet  Commonly known as: TYLENOL      Dose: 500 mg  Take 500 mg by mouth every 4 hours as needed AND 1 TAB PO Q4H PRN  Refills: 0     * acetaminophen 500 MG tablet  Commonly known as: TYLENOL      Dose: 1,000 mg  Take 1,000 mg by mouth 3 times daily  Refills: 0     amLODIPine 5 MG tablet  Commonly known as: NORVASC      Dose: 5 mg  Take 5 mg by mouth daily  Refills: 0     aspirin 81 MG EC tablet  Commonly known as: ASA      Dose: 81 mg  [ASPIRIN 81 MG EC TABLET] Take 81 mg by mouth daily.  Refills: 0     atorvastatin 40 MG tablet  Commonly known as: LIPITOR      Dose: 40 mg  Take 40 mg by mouth every evening  Refills: 0     carvedilol 25 MG tablet  Commonly known as: COREG      Dose: 25 mg  Take 25 mg by mouth 2 times daily  Refills: 0     citalopram 10 MG tablet  Commonly known as: celeXA      Dose: 20 mg  Take 20 mg by mouth daily  Refills: 0     clopidogrel 75 MG tablet  Commonly known as: PLAVIX      Dose: 1 tablet  Take 1 tablet by mouth every morning  Refills: 0     empagliflozin 10 MG Tabs tablet  Commonly known as: JARDIANCE      Dose: 10 mg  Take 10 mg by mouth daily  Refills: 0     furosemide 20 MG tablet  Commonly known as: LASIX      Dose: 20 mg  Take 20 mg by mouth  Refills: 0     hydrALAZINE 25 MG tablet  Commonly known as: APRESOLINE      Dose: 75 mg  Take 75 mg by mouth 2 times daily  Refills: 0     HYDROmorphone 2 MG tablet  Commonly known as: DILAUDID  Used for: Closed compression fracture of L2 lumbar vertebra with routine healing, subsequent encounter      Dose: 2  mg  Take 1 tablet (2 mg) by mouth every 8 hours as needed for pain or severe pain  Quantity: 30 tablet  Refills: 0     ICaps AREDS Formula Tabs      Dose: 1 capsule  [VITAMINS  A,C,E-ZINC-COPPER (ICAPS AREDS) 7,160-113-100 UNIT-MG-UNIT TBEC] Take 1 capsule by mouth daily.  Refills: 0     insulin aspart 100 UNITS/ML vial  Commonly known as: NovoLOG VIAL      Inject Subcutaneous 3 times daily (with meals) Inject as per sliding scale: if 200 - 250 = 2 units ; 251 - 300  = 4 units; 301 - 350 = 6 units; 351 - 400 = 8 units; 401  - 450 = 10 units; 451+ = 12 units  Refills: 0     insulin glargine 100 UNIT/ML pen  Commonly known as: LANTUS PEN      Dose: 10 Units  Inject 10 Units Subcutaneous At Bedtime  Quantity: 15 mL  Refills: 0     * isosorbide mononitrate 30 MG 24 hr tablet  Commonly known as: IMDUR      Dose: 30 mg  Take 30 mg by mouth daily Give with 120 mg tab  Refills: 0     * isosorbide mononitrate  MG 24 HR ER tablet  Commonly known as: IMDUR      Dose: 120 mg  Take 120 mg by mouth  Refills: 0     Lidocaine 4 % Patch  Commonly known as: LIDOCARE      Dose: 1 patch  Place 1 patch onto the skin every 24 hours To prevent lidocaine toxicity, patient should be patch free for 12 hrs daily.  Refills: 0     magnesium chloride 535 (64 Mg) MG Tbec CR tablet      Dose: 535 mg  Take 535 mg by mouth daily  Refills: 0     metFORMIN 500 MG 24 hr tablet  Commonly known as: GLUCOPHAGE XR  Indication: Type 2 Diabetes      Dose: 1,000 mg  Take 1,000 mg by mouth daily (with dinner)  Refills: 0     mirtazapine 7.5 MG tablet  Commonly known as: REMERON      Dose: 7.5 mg  Take 7.5 mg by mouth At Bedtime  Refills: 0     nitroGLYcerin 0.4 MG sublingual tablet  Commonly known as: NITROSTAT      Dose: 0.4 mg  [NITROGLYCERIN (NITROSTAT) 0.4 MG SL TABLET] Place 0.4 mg under the tongue as needed.  Refills: 0     ondansetron 8 MG tablet  Commonly known as: ZOFRAN      Dose: 8 mg  Take 8 mg by mouth every 8 hours as needed for  nausea  Refills: 0     pantoprazole 20 MG EC tablet  Commonly known as: PROTONIX      Dose: 20 mg  Take 20 mg by mouth every other day  Refills: 0     polyethylene glycol 17 g packet  Commonly known as: MIRALAX      Dose: 17 g  Take 17 g by mouth daily as needed for constipation  Refills: 0     senna-docusate 8.6-50 MG tablet  Commonly known as: SENOKOT-S/PERICOLACE      Dose: 2 tablet  Take 2 tablets by mouth 2 times daily  Refills: 0     terazosin 10 MG capsule  Commonly known as: HYTRIN      Dose: 1 capsule  Take 1 capsule by mouth At Bedtime  Refills: 0         * This list has 4 medication(s) that are the same as other medications prescribed for you. Read the directions carefully, and ask your doctor or other care provider to review them with you.               Where to get your medicines      These medications were sent to [facility] [address] [facility]  [address] [facility] [address]    Phone: [phone]     HYDROmorphone 2 MG tablet          Case Management:  I have reviewed the care plan and MDS and do agree with the plan. Patient's desire to return to the community is not present. Information reviewed:  Medications, vital signs, orders, and nursing notes.    ROS:  10 point ROS of systems including Constitutional, Eyes, Respiratory, Cardiovascular, Gastroenterology, Genitourinary, Integumentary, Musculoskeletal, Psychiatric were all negative except for pertinent positives noted in my HPI.    Vitals:  BP (!) 167/50   Pulse 65   Temp 98.9  F (37.2  C)   Resp 16   Wt 61.6 kg (135 lb 12.8 oz)   SpO2 95%   BMI 21.27 kg/m    Body mass index is 21.27 kg/m .  Exam:  GENERAL APPEARANCE:  Alert, in no distress  ENT:  Mouth and posterior oropharynx normal, moist mucous membranes  EYES:  EOM, conjunctivae, lids, pupils and irises normal  RESP:  respiratory effort and palpation of chest normal, lungs clear to auscultation   CV:  Palpation and auscultation  of heart done , regular rate and rhythm, no murmur, rub, or gallop, no edema  ABDOMEN:  normal bowel sounds, soft, nontender, no hepatosplenomegaly or other masses  M/S:   TLSO brace on, walking Ind with 4WW  NEURO:   Cranial nerves 2-12 are normal tested and grossly at patient's baseline  PSYCH:  oriented X 3, normal insight, judgement and memory, affect and mood normal    Lab/Diagnostic data:     Most Recent 3 CBC's:Recent Labs   Lab Test 06/13/23  0521 06/12/23  1701 06/12/23  0731   WBC 4.3 4.3 3.8*   HGB 9.6* 10.7* 10.5*    100 103*   * 120* 105*     Most Recent 3 BMP's:Recent Labs   Lab Test 07/13/23  0940 06/13/23  1223 06/13/23  0755 06/13/23  0521 06/12/23  0736 06/12/23  0731     --   --  141  --  140   POTASSIUM 5.0  --   --  4.2  --  3.7   CHLORIDE 106  --   --  107  --  104   CO2 22  --   --  22  --  24   BUN 52.6*  --   --  46.4*  --  44.2*   CR 1.20*  --   --  1.19*  --  0.94   ANIONGAP 11  --   --  12  --  12   VINAYAK 8.3  --   --  8.8  --  9.0   * 141* 105* 132*   < > 172*    < > = values in this interval not displayed.       ASSESSMENT/PLAN  (E11.65,  Z79.4) Type 2 diabetes mellitus with hyperglycemia, with long-term current use of insulin (H)  (primary encounter diagnosis)  Comment: Blood sugar range . Pt reports his appetite is good. SGLT2 could be beneficial for pt both with lower blood sugar and reducing blood pressure.   Plan:   -Continue Metformin 1000 mg BID  -Continue Lantus 11 units subcutaneous at bedtime  -Start empagliflozin 10 mg PO, repeat BMP on 7/26  -Consider DPP4 should pt experience weight loss     (E83.42) Hypomagnesemia  Comment: High blood sugar levels could be contributing to increased magnesium secretion. Protonix could also be contributing to decreased magnesium absorption.  Plan:   -Continue Protonix 20 mg PO every other day, monitor for GI bleeding  -Continue magnesium chloride 535 mg delayed release 1 tab daily   -Recheck BMP on  7/26    (I25.810) Coronary artery disease involving coronary bypass graft of native heart without angina pectoris  (I25.2) History of non-ST elevation myocardial infarction (NSTEMI)  (I10) Essential (primary) hypertension  Comment: Pt denies CP, SOB. No recent blood pressures. Last B/P on 6/28 was 167/50. Since then, my colleague started Amlodipine 5 mg PO on 7/14. Hydralazine also reduced on 6/15/23 to 50 mg PO BID  Plan:   -Vitals ordered bimonthly   -Continue Hydralazine 50 mg PO BID  -Continue Imdur 120 mg daily   -Continue clopidogrel 75 mg daily   -Continue Aspirin 81 mg daily   -Continue Atorvastatin 40 mg PO daily   -Continue Nitroglycerin 0.4 mg Q 5 minutes as needed for CP     (S32.020D) Closed compression fracture of L2 lumbar vertebra with routine healing, subsequent encounter  (R52) Pain  (M80.08XD) Age-related osteoporosis with current pathological fracture, vertebra(e), subsequent encounter for fracture with routine healing  Comment: Dexascan on 6/16/23 revealed osteoporosis. Pt continues to report higher levels of pain and needing dilaudid. He is able to sleep. Neurosurgery not surprised pt has had ongoing pain given worsening height loss of L4.   Plan:   -Continue custom TLSO brace per neurosurgery recommendations   -Repeat xrays in 6 weeks with neurosurgery  -Start Lidocain patch 4% 1 patch daily (on 12 hours, off 12 hours)  -Continue Tylenol 1000 mg PO TID  -Continue Dilaudid 2 mg q 8 hours PRN severe pain   -Continue Alendronate 70 mg q 7days     Orders:  Start Lidocain patch 4% 1 patch daily (on 12 hours, off 12 hours)  Bimonthly vitals  -Start empagliflozin 10 mg PO, repeat BMP on 7/26    Electronically signed by:  PAULINA Buitrago CNP                  Sincerely,        PAULINA Buitrago CNP

## 2023-07-18 NOTE — PROGRESS NOTES
[facility]  Chief Complaint   Patient presents with     GET     [place] [facility] Record Number:  [id number]  Place of Service where encounter took place:  [facility] () [[id number]]    HPI:    Barak Berry  is 89 year old (1/6/1932) with pmhx including recent NSTEMI, prior CABG (2x), HFpEF, PAD, T2DM, falls, who is being seen today for a federally mandated E/M visit. Today's concerns are:     Hypomagnesemia  Coronary artery disease involving coronary bypass graft of native heart without angina pectoris  History of non-ST elevation myocardial infarction (NSTEMI)  Essential (primary) hypertension  Type 2 diabetes mellitus with hyperglycemia, with long-term current use of insulin (H)  Closed compression fracture of L2 lumbar vertebra with routine healing, subsequent encounter  Pain  Age-related osteoporosis with current pathological fracture, vertebra(e), subsequent encounter for fracture with routine healing    Today    Pt seen in his room as he was getting ready for lunch. He is wearing a [employer] shirt, and reports he loves watching [employer]ock[employer] and football. He was seen standing and moving around room with his Anonym[employer] brace on. He reports he continues to have back pain which bothers him. He saw neurosurgery on 6/16, and imaging revealed worsening L4 height loss as well as unchanged chronic L1 compression deformity, and interval progression of height loss corresponding L2 inferior endplate compression fracture since 5/17/23. Back pain is helped by Dilaudid 2 mg PRN. Otherwise, he is feeling good today. He reports regular BMs today, but he does report he had diarrhea 4-5 episodes 2 days ago.    We discussed his recent low Magnesium level of 1.1 and how Protonix medication could be contributing. Protonix switched to every other day by my colleague. Pt denies symptoms of GERD, and he does not recall past GI bleed though GI bleed history shows up in chart review. He is on  Plavix and Aspirin with his history of NSTEMI and CABG (x2). We discussed how reducing protonix while taking aspirin and plavix could increase risk of GI bleeding, but pronotix could also be causing hypomagnesia. He was comfortable reducing protonix dosing to every other day and continuing to monitor for signs of GI bleed.     We also discussed his varying blood sugars. He reports his appetite has been normal. He did have episode of hypoglycemia which prompted hospitalization, but blood sugars have been as high as 355. We talked about starting SGLT2 to help with blood sugar control and discussed side effects of increased urination, low blood sugar, and possible weight loss. He would like to try oral medication.    ALLERGIES:Cilostazol, Acetaminophen, Amlodipine, Hydrocodone, and Vicodin [hydrocodone-acetaminophen]  PAST MEDICAL HISTORY:   Past Medical History:   Diagnosis Date     CAD (coronary artery disease)     s/p CABG 1999, 2011     Chronic diastolic congestive heart failure (H)      CKD (chronic kidney disease) stage 3, GFR 30-59 ml/min (H)      Diabetes mellitus, type 2 (H)      Dyslipidemia      Gastroesophageal reflux disease      Hypertension      PAD (peripheral artery disease) (H)      Recurrent falls      Sick sinus syndrome (H)     s/p pacemaker     Thrombocytopenia (H)      PAST SURGICAL HISTORY:   has a past surgical history that includes CABG, VEIN, SINGLE; CORONARY STENT PERCUT, INITIAL VESSEL; appendectomy; Arthroscopy knee (Left); Total Knee Arthroplasty (Left); Lumbar Laminectomy; Tonsillectomy; joint replacement; Cardiac surgery; and Colonoscopy w/wo Brush **Performed** (N/A, 1/3/2019).  FAMILY HISTORY: family history is not on file.  SOCIAL HISTORY:  reports that he quit smoking about 62 years ago. He has a 45.00 pack-year smoking history. He has never used smokeless tobacco. He reports that he does not currently use alcohol. He reports that he does not use drugs.    MEDICATIONS:  MED REC  REQUIRED{Post Medication Reconciliation Status: discharge medications reconciled and changed, per note/orders         Review of your medicines          Accurate as of July 17, 2023 11:59 PM. If you have any questions, ask your nurse or doctor.            CONTINUE these medicines which have NOT CHANGED      Dose / Directions   * acetaminophen 500 MG tablet  Commonly known as: TYLENOL      Dose: 500 mg  Take 500 mg by mouth every 4 hours as needed AND 1 TAB PO Q4H PRN  Refills: 0     * acetaminophen 500 MG tablet  Commonly known as: TYLENOL      Dose: 1,000 mg  Take 1,000 mg by mouth 3 times daily  Refills: 0     amLODIPine 5 MG tablet  Commonly known as: NORVASC      Dose: 5 mg  Take 5 mg by mouth daily  Refills: 0     aspirin 81 MG EC tablet  Commonly known as: ASA      Dose: 81 mg  [ASPIRIN 81 MG EC TABLET] Take 81 mg by mouth daily.  Refills: 0     atorvastatin 40 MG tablet  Commonly known as: LIPITOR      Dose: 40 mg  Take 40 mg by mouth every evening  Refills: 0     carvedilol 25 MG tablet  Commonly known as: COREG      Dose: 25 mg  Take 25 mg by mouth 2 times daily  Refills: 0     citalopram 10 MG tablet  Commonly known as: celeXA      Dose: 20 mg  Take 20 mg by mouth daily  Refills: 0     clopidogrel 75 MG tablet  Commonly known as: PLAVIX      Dose: 1 tablet  Take 1 tablet by mouth every morning  Refills: 0     empagliflozin 10 MG Tabs tablet  Commonly known as: JARDIANCE      Dose: 10 mg  Take 10 mg by mouth daily  Refills: 0     furosemide 20 MG tablet  Commonly known as: LASIX      Dose: 20 mg  Take 20 mg by mouth  Refills: 0     hydrALAZINE 25 MG tablet  Commonly known as: APRESOLINE      Dose: 75 mg  Take 75 mg by mouth 2 times daily  Refills: 0     HYDROmorphone 2 MG tablet  Commonly known as: DILAUDID  Used for: Closed compression fracture of L2 lumbar vertebra with routine healing, subsequent encounter      Dose: 2 mg  Take 1 tablet (2 mg) by mouth every 8 hours as needed for pain or severe  pain  Quantity: 30 tablet  Refills: 0     ICaps AREDS Formula Tabs      Dose: 1 capsule  [VITAMINS  A,C,E-ZINC-COPPER (ICAPS AREDS) 7,160-113-100 UNIT-MG-UNIT TBEC] Take 1 capsule by mouth daily.  Refills: 0     insulin aspart 100 UNITS/ML vial  Commonly known as: NovoLOG VIAL      Inject Subcutaneous 3 times daily (with meals) Inject as per sliding scale: if 200 - 250 = 2 units ; 251 - 300  = 4 units; 301 - 350 = 6 units; 351 - 400 = 8 units; 401  - 450 = 10 units; 451+ = 12 units  Refills: 0     insulin glargine 100 UNIT/ML pen  Commonly known as: LANTUS PEN      Dose: 10 Units  Inject 10 Units Subcutaneous At Bedtime  Quantity: 15 mL  Refills: 0     * isosorbide mononitrate 30 MG 24 hr tablet  Commonly known as: IMDUR      Dose: 30 mg  Take 30 mg by mouth daily Give with 120 mg tab  Refills: 0     * isosorbide mononitrate  MG 24 HR ER tablet  Commonly known as: IMDUR      Dose: 120 mg  Take 120 mg by mouth  Refills: 0     Lidocaine 4 % Patch  Commonly known as: LIDOCARE      Dose: 1 patch  Place 1 patch onto the skin every 24 hours To prevent lidocaine toxicity, patient should be patch free for 12 hrs daily.  Refills: 0     magnesium chloride 535 (64 Mg) MG Tbec CR tablet      Dose: 535 mg  Take 535 mg by mouth daily  Refills: 0     metFORMIN 500 MG 24 hr tablet  Commonly known as: GLUCOPHAGE XR  Indication: Type 2 Diabetes      Dose: 1,000 mg  Take 1,000 mg by mouth daily (with dinner)  Refills: 0     mirtazapine 7.5 MG tablet  Commonly known as: REMERON      Dose: 7.5 mg  Take 7.5 mg by mouth At Bedtime  Refills: 0     nitroGLYcerin 0.4 MG sublingual tablet  Commonly known as: NITROSTAT      Dose: 0.4 mg  [NITROGLYCERIN (NITROSTAT) 0.4 MG SL TABLET] Place 0.4 mg under the tongue as needed.  Refills: 0     ondansetron 8 MG tablet  Commonly known as: ZOFRAN      Dose: 8 mg  Take 8 mg by mouth every 8 hours as needed for nausea  Refills: 0     pantoprazole 20 MG EC tablet  Commonly known as: PROTONIX       Dose: 20 mg  Take 20 mg by mouth every other day  Refills: 0     polyethylene glycol 17 g packet  Commonly known as: MIRALAX      Dose: 17 g  Take 17 g by mouth daily as needed for constipation  Refills: 0     senna-docusate 8.6-50 MG tablet  Commonly known as: SENOKOT-S/PERICOLACE      Dose: 2 tablet  Take 2 tablets by mouth 2 times daily  Refills: 0     terazosin 10 MG capsule  Commonly known as: HYTRIN      Dose: 1 capsule  Take 1 capsule by mouth At Bedtime  Refills: 0         * This list has 4 medication(s) that are the same as other medications prescribed for you. Read the directions carefully, and ask your doctor or other care provider to review them with you.               Where to get your medicines      These medications were sent to [facility] [address] [facility] [address]  [address] [facility] [address]    Phone: [phone]     HYDROmorphone 2 MG tablet          Case Management:  I have reviewed the care plan and MDS and do agree with the plan. Patient's desire to return to the community is not present. Information reviewed:  Medications, vital signs, orders, and nursing notes.    ROS:  10 point ROS of systems including Constitutional, Eyes, Respiratory, Cardiovascular, Gastroenterology, Genitourinary, Integumentary, Musculoskeletal, Psychiatric were all negative except for pertinent positives noted in my HPI.    Vitals:  BP (!) 167/50   Pulse 65   Temp 98.9  F (37.2  C)   Resp 16   Wt 61.6 kg (135 lb 12.8 oz)   SpO2 95%   BMI 21.27 kg/m    Body mass index is 21.27 kg/m .  Exam:  GENERAL APPEARANCE:  Alert, in no distress  ENT:  Mouth and posterior oropharynx normal, moist mucous membranes  EYES:  EOM, conjunctivae, lids, pupils and irises normal  RESP:  respiratory effort and palpation of chest normal, lungs clear to auscultation   CV:  Palpation and auscultation of heart done , regular rate and rhythm, no murmur, rub, or gallop, no  edema  ABDOMEN:  normal bowel sounds, soft, nontender, no hepatosplenomegaly or other masses  M/S:   TLSO brace on, walking Ind with 4WW  NEURO:   Cranial nerves 2-12 are normal tested and grossly at patient's baseline  PSYCH:  oriented X 3, normal insight, judgement and memory, affect and mood normal    Lab/Diagnostic data:     Most Recent 3 CBC's:Recent Labs   Lab Test 06/13/23  0521 06/12/23  1701 06/12/23  0731   WBC 4.3 4.3 3.8*   HGB 9.6* 10.7* 10.5*    100 103*   * 120* 105*     Most Recent 3 BMP's:Recent Labs   Lab Test 07/13/23  0940 06/13/23  1223 06/13/23  0755 06/13/23  0521 06/12/23  0736 06/12/23  0731     --   --  141  --  140   POTASSIUM 5.0  --   --  4.2  --  3.7   CHLORIDE 106  --   --  107  --  104   CO2 22  --   --  22  --  24   BUN 52.6*  --   --  46.4*  --  44.2*   CR 1.20*  --   --  1.19*  --  0.94   ANIONGAP 11  --   --  12  --  12   VINAYAK 8.3  --   --  8.8  --  9.0   * 141* 105* 132*   < > 172*    < > = values in this interval not displayed.       ASSESSMENT/PLAN  (E11.65,  Z79.4) Type 2 diabetes mellitus with hyperglycemia, with long-term current use of insulin (H)  (primary encounter diagnosis)  Comment: Blood sugar range . Pt reports his appetite is good. SGLT2 could be beneficial for pt both with lower blood sugar and reducing blood pressure.   Plan:   -Continue Metformin 1000 mg BID  -Continue Lantus 11 units subcutaneous at bedtime  -Start empagliflozin 10 mg PO, repeat BMP on 7/26  -Consider DPP4 should pt experience weight loss     (E83.42) Hypomagnesemia  Comment: High blood sugar levels could be contributing to increased magnesium secretion. Protonix could also be contributing to decreased magnesium absorption.  Plan:   -Continue Protonix 20 mg PO every other day, monitor for GI bleeding  -Continue magnesium chloride 535 mg delayed release 1 tab daily   -Recheck BMP on 7/26    (I25.810) Coronary artery disease involving coronary bypass graft of native  heart without angina pectoris  (I25.2) History of non-ST elevation myocardial infarction (NSTEMI)  (I10) Essential (primary) hypertension  Comment: Pt denies CP, SOB. No recent blood pressures. Last B/P on 6/28 was 167/50. Since then, my colleague started Amlodipine 5 mg PO on 7/14. Hydralazine also reduced on 6/15/23 to 50 mg PO BID  Plan:   -Vitals ordered bimonthly   -Continue Hydralazine 50 mg PO BID  -Continue Imdur 120 mg daily   -Continue clopidogrel 75 mg daily   -Continue Aspirin 81 mg daily   -Continue Atorvastatin 40 mg PO daily   -Continue Nitroglycerin 0.4 mg Q 5 minutes as needed for CP     (S32.020D) Closed compression fracture of L2 lumbar vertebra with routine healing, subsequent encounter  (R52) Pain  (M80.08XD) Age-related osteoporosis with current pathological fracture, vertebra(e), subsequent encounter for fracture with routine healing  Comment: Dexascan on 6/16/23 revealed osteoporosis. Pt continues to report higher levels of pain and needing dilaudid. He is able to sleep. Neurosurgery not surprised pt has had ongoing pain given worsening height loss of L4.   Plan:   -Continue custom TLSO brace per neurosurgery recommendations   -Repeat xrays in 6 weeks with neurosurgery  -Start Lidocain patch 4% 1 patch daily (on 12 hours, off 12 hours)  -Continue Tylenol 1000 mg PO TID  -Continue Dilaudid 2 mg q 8 hours PRN severe pain   -Continue Alendronate 70 mg q 7days     Orders:  Start Lidocain patch 4% 1 patch daily (on 12 hours, off 12 hours)  Bimonthly vitals  -Start empagliflozin 10 mg PO, repeat BMP on 7/26    Electronically signed by:  PAULINA Buitrago CNP

## 2023-07-23 NOTE — TELEPHONE ENCOUNTER
[facility] TRIAGE ENCOUNTER    Chief Complaint   Patient presents with     Bleeding/Bruising       Barak Berry is a 89 year old  (1/6/1932), Nurse called today to report: patient still having bleeding after a fall . If continues to bleed if still oozing would send to ER. VSS. Neuros intact    ASSESSMENT/PLAN    Continue to monitor,     Electronically signed by:   Kristin Vail NP

## 2023-07-27 NOTE — LETTER
7/27/2023        RE: Barak Berry  [address] [place] [address]        [facility] GERIATR[facility]  Chief Complaint   Patient presents with     GET     [facility] Record Number:  [id number]  Place of Service where encounter took place:  [facility] () [[id number]]    HPI:    Barak Berry  is 89 year old (1/6/1932) with pmhx including recent NSTEMI, prior CABG (2x), HFpEF, PAD, T2DM, falls, who is being seen today for a federally mandated E/M visit. Today's concerns are: Wound healing following fall, diabetes, hyperglycemia, hypomagnesia, and GERD    Today    Pt seen on 7/22/23 at [facility] Emergency department after falling and obtaining head laceration. Head CT and blood work was unremarkable, and he was instructed to follow up in 7/10 days for staple removal. [employer] pacemaker was interrogated without any noted cardiac events.    Seen today following session with PT. He denies pain. Removed bandage on head. Dressing C,D,I and staples in place with minimal dried sanguinous drainage. When asked about the fall, he reports he did not loose consciousness, and he was in the bathroom and does not remember how he fell. He reports he does not remember feeling dizzy prior. He was instructed to follow up for staple removal in 7-10 days, and he set up an appointment with his primary care doctor through [facility]. When informed he has primary care available to him through [facility], he did confirm he would like to continue primary care with [facility].     Recently, he was started on empagliflozin for diabetes and pt questioned how he was feeling since this medication started. He reports he has been feeling okay. We did discuss how he continues to have blood sugars above 300-400s. Pt agreeable to increase lantus and increase empagliflozin.     Otherwise, he has no complaints. He still experiences back pain which is relieved with  dilaudid.    ALLERGIES:Cilostazol, Acetaminophen, Amlodipine, Hydrocodone, and Vicodin [hydrocodone-acetaminophen]  PAST MEDICAL HISTORY:   Past Medical History:   Diagnosis Date     CAD (coronary artery disease)     s/p CABG 1999, 2011     Chronic diastolic congestive heart failure (H)      CKD (chronic kidney disease) stage 3, GFR 30-59 ml/min (H)      Diabetes mellitus, type 2 (H)      Dyslipidemia      Gastroesophageal reflux disease      Hypertension      PAD (peripheral artery disease) (H)      Recurrent falls      Sick sinus syndrome (H)     s/p pacemaker     Thrombocytopenia (H)      PAST SURGICAL HISTORY:   has a past surgical history that includes CABG, VEIN, SINGLE; CORONARY STENT PERCUT, INITIAL VESSEL; appendectomy; Arthroscopy knee (Left); Total Knee Arthroplasty (Left); Lumbar Laminectomy; Tonsillectomy; joint replacement; Cardiac surgery; and Colonoscopy w/wo Brush **Performed** (N/A, 1/3/2019).  FAMILY HISTORY: family history is not on file.  SOCIAL HISTORY:  reports that he quit smoking about 62 years ago. He has a 45.00 pack-year smoking history. He has never used smokeless tobacco. He reports that he does not currently use alcohol. He reports that he does not use drugs.    MEDICATIONS:  MED REC REQUIRED{Post Medication Reconciliation Status: discharge medications reconciled and changed, per note/orders         Review of your medicines          Accurate as of July 27, 2023 11:59 PM. If you have any questions, ask your nurse or doctor.            CONTINUE these medicines which have NOT CHANGED      Dose / Directions   * acetaminophen 500 MG tablet  Commonly known as: TYLENOL      Dose: 500 mg  Take 500 mg by mouth every 4 hours as needed AND 1 TAB PO Q4H PRN  Refills: 0     * acetaminophen 500 MG tablet  Commonly known as: TYLENOL      Dose: 1,000 mg  Take 1,000 mg by mouth 3 times daily  Refills: 0     alendronate 70 MG tablet  Commonly known as: FOSAMAX      Dose: 70 mg  Take 70 mg by mouth every  7 days Sit upright 30 min after  Refills: 0     amLODIPine 5 MG tablet  Commonly known as: NORVASC      Dose: 5 mg  Take 5 mg by mouth daily  Refills: 0     aspirin 81 MG EC tablet  Commonly known as: ASA      Dose: 81 mg  [ASPIRIN 81 MG EC TABLET] Take 81 mg by mouth daily.  Refills: 0     atorvastatin 40 MG tablet  Commonly known as: LIPITOR      Dose: 40 mg  Take 40 mg by mouth every evening  Refills: 0     carvedilol 25 MG tablet  Commonly known as: COREG      Dose: 25 mg  Take 25 mg by mouth 2 times daily  Refills: 0     citalopram 10 MG tablet  Commonly known as: celeXA      Dose: 20 mg  Take 20 mg by mouth daily  Refills: 0     clopidogrel 75 MG tablet  Commonly known as: PLAVIX      Dose: 1 tablet  Take 1 tablet by mouth every morning  Refills: 0     empagliflozin 10 MG Tabs tablet  Commonly known as: JARDIANCE      Dose: 10 mg  Take 10 mg by mouth daily  Refills: 0     furosemide 20 MG tablet  Commonly known as: LASIX      Dose: 20 mg  Take 20 mg by mouth  Refills: 0     hydrALAZINE 25 MG tablet  Commonly known as: APRESOLINE      Dose: 75 mg  Take 75 mg by mouth 2 times daily  Refills: 0     HYDROmorphone 2 MG tablet  Commonly known as: DILAUDID  Used for: Closed compression fracture of L2 lumbar vertebra with routine healing, subsequent encounter      Dose: 2 mg  Take 1 tablet (2 mg) by mouth every 8 hours as needed for pain or severe pain  Quantity: 30 tablet  Refills: 0     ICaps AREDS Formula Tabs      Dose: 1 capsule  [VITAMINS  A,C,E-ZINC-COPPER (ICAPS AREDS) 7,160-113-100 UNIT-MG-UNIT TBEC] Take 1 capsule by mouth daily.  Refills: 0     insulin aspart 100 UNITS/ML vial  Commonly known as: NovoLOG VIAL      Inject Subcutaneous 3 times daily (with meals) Inject as per sliding scale: if 200 - 250 = 2 units ; 251 - 300  = 4 units; 301 - 350 = 6 units; 351 - 400 = 8 units; 401  - 450 = 10 units; 451+ = 12 units  Refills: 0     insulin glargine 100 UNIT/ML pen  Commonly known as: LANTUS PEN      Dose: 10  Units  Inject 10 Units Subcutaneous At Bedtime  Quantity: 15 mL  Refills: 0     * isosorbide mononitrate 30 MG 24 hr tablet  Commonly known as: IMDUR      Dose: 30 mg  Take 30 mg by mouth daily Give with 120 mg tab  Refills: 0     * isosorbide mononitrate  MG 24 HR ER tablet  Commonly known as: IMDUR      Dose: 120 mg  Take 120 mg by mouth  Refills: 0     Lidocaine 4 % Patch  Commonly known as: LIDOCARE      Dose: 1 patch  Place 1 patch onto the skin every 24 hours To prevent lidocaine toxicity, patient should be patch free for 12 hrs daily.  Refills: 0     magnesium chloride 535 (64 Mg) MG Tbec CR tablet      Dose: 535 mg  Take 535 mg by mouth daily  Refills: 0     metFORMIN 500 MG 24 hr tablet  Commonly known as: GLUCOPHAGE XR  Indication: Type 2 Diabetes      Dose: 1,000 mg  Take 1,000 mg by mouth daily (with dinner)  Refills: 0     mirtazapine 7.5 MG tablet  Commonly known as: REMERON      Dose: 7.5 mg  Take 7.5 mg by mouth At Bedtime  Refills: 0     nitroGLYcerin 0.4 MG sublingual tablet  Commonly known as: NITROSTAT      Dose: 0.4 mg  [NITROGLYCERIN (NITROSTAT) 0.4 MG SL TABLET] Place 0.4 mg under the tongue as needed.  Refills: 0     ondansetron 8 MG tablet  Commonly known as: ZOFRAN      Dose: 8 mg  Take 8 mg by mouth every 8 hours as needed for nausea  Refills: 0     pantoprazole 20 MG EC tablet  Commonly known as: PROTONIX      Dose: 20 mg  Take 20 mg by mouth every other day  Refills: 0     polyethylene glycol 17 g packet  Commonly known as: MIRALAX      Dose: 17 g  Take 17 g by mouth daily as needed for constipation  Refills: 0     senna-docusate 8.6-50 MG tablet  Commonly known as: SENOKOT-S/PERICOLACE      Dose: 2 tablet  Take 2 tablets by mouth 2 times daily  Refills: 0     terazosin 10 MG capsule  Commonly known as: HYTRIN      Dose: 1 capsule  Take 1 capsule by mouth At Bedtime  Refills: 0         * This list has 4 medication(s) that are the same as other medications prescribed for you. Read  the directions carefully, and ask your doctor or other care provider to review them with you.                 Case Management:  I have reviewed the care plan and MDS and do agree with the plan. Patient's desire to return to the community is not present. Information reviewed:  Medications, vital signs, orders, and nursing notes.    ROS:  10 point ROS of systems including Constitutional, Eyes, Respiratory, Cardiovascular, Gastroenterology, Genitourinary, Integumentary, Musculoskeletal, Psychiatric were all negative except for pertinent positives noted in my HPI.    Vitals:  BP (!) 166/59   Pulse 77   Temp 98.6  F (37  C)   Resp 18   Wt 61 kg (134 lb 6.4 oz)   SpO2 94%   BMI 21.05 kg/m    Body mass index is 21.05 kg/m .  Exam:  GENERAL APPEARANCE:  Alert, in no distress  ENT:  Mouth and posterior oropharynx normal, moist mucous membranes  HEAD: 5 cm laceration, staples in place, minimal dried sanguinous drainage   EYES:  EOM, conjunctivae, lids, pupils and irises normal  RESP:  respiratory effort and palpation of chest normal, lungs clear to auscultation   CV:  Palpation and auscultation of heart done , regular rate and rhythm, no murmur, rub, or gallop, no edema  ABDOMEN:  normal bowel sounds, soft, nontender, no hepatosplenomegaly or other masses  M/S:   TLSO brace on, walking Ind with 4WW  NEURO:   Cranial nerves 2-12 are normal tested and grossly at patient's baseline  PSYCH:  oriented X 3, normal insight, judgement and memory, affect and mood normal    Lab/Diagnostic data:     Most Recent 3 CBC's:  Recent Labs   Lab Test 06/13/23  0521 06/12/23  1701 06/12/23  0731   WBC 4.3 4.3 3.8*   HGB 9.6* 10.7* 10.5*    100 103*   * 120* 105*     Most Recent 3 BMP's:  Recent Labs   Lab Test 07/26/23  1323 07/13/23  0940 06/13/23  1223 06/13/23  0755 06/13/23  0521    139  --   --  141   POTASSIUM 5.0 5.0  --   --  4.2   CHLORIDE 105 106  --   --  107   CO2 21* 22  --   --  22   BUN 55.9* 52.6*  --    --  46.4*   CR 1.26* 1.20*  --   --  1.19*   ANIONGAP 12 11  --   --  12   VINAYAK 8.7 8.3  --   --  8.8   * 202* 141*   < > 132*    < > = values in this interval not displayed.       ASSESSMENT/PLAN  (E11.65,  Z79.4) Type 2 diabetes mellitus with hyperglycemia, with long-term current use of insulin (H)  (primary encounter diagnosis)  Comment: Blood sugar range 108-396. Pt reports his appetite is good. Empagliflozin 10 mg PO started on 7/17. Weight continues to range from 135 lb to 137 lbs.   Plan:   -Continue Metformin 1000 mg BID  -Increase Lantus from 11 units to 13 units subcutaneous at bedtime  -Increase empagliflozin from 10 mg to 25 mg PO  -Consider DPP4 should pt experience weight loss     (E83.42) Hypomagnesemia  Comment: High blood sugar levels could be contributing to increased magnesium excretion. Protonix could also be contributing to decreased magnesium absorption. Rechecked and improved since starting Magnesium chloride.   Plan:   -Continue Protonix 20 mg PO every other day, monitor for GI bleeding  -Continue magnesium chloride 535 mg delayed release 1 tab daily     (S32.020D) Closed compression fracture of L2 lumbar vertebra with routine healing, subsequent encounter  (R52) Pain  (M80.08XD) Age-related osteoporosis with current pathological fracture, vertebra(e), subsequent encounter for fracture with routine healing  Comment: Dexascan on 6/16/23 revealed osteoporosis. Pt continues to report higher levels of pain and needing dilaudid. He is able to sleep. Neurosurgery not surprised pt has had ongoing pain given worsening height loss of L4.   Plan:   -Continue custom TLSO brace per neurosurgery recommendations   -Repeat xrays in 6 weeks with neurosurgery  -Continue Lidocain patch 4% 1 patch daily (on 12 hours, off 12 hours)  -Continue Tylenol 1000 mg PO TID  -Continue Dilaudid 2 mg q 8 hours PRN severe pain   -Continue Alendronate 70 mg q 7days     (S01.01XD) Laceration of scalp without foreign  body, subsequent encounter  Comment: Pt reports he already has follow up appointment at [facility] for staple removal   Plan:   -Continue dressings as previously ordered    Orders:  -Change lantus from 11 units to 13 units  -Increase Empagliflozin from 10 mg PO daily to 25 mg PO daily     Electronically signed by:  PAULINA Buitrago CNP                  Sincerely,        PAULINA Buitrgao CNP

## 2023-07-29 NOTE — PROGRESS NOTES
[facility] GERIATRICS  Chief Complaint   Patient presents with     GET     [facility] Medical Record Number:  [id number]  Place of Service where encounter took place:  [facility] () [[id number]]    HPI:    Barak Berry  is 89 year old (1/6/1932) with pmhx including recent NSTEMI, prior CABG (2x), HFpEF, PAD, T2DM, falls, who is being seen today for a federally mandated E/M visit. Today's concerns are: Wound healing following fall, diabetes, hyperglycemia, hypomagnesia, and GERD    Today    Pt seen on 7/22/23 at [facility] Emergency department after falling and obtaining head laceration. Head CT and blood work was unremarkable, and he was instructed to follow up in 7/10 days for staple removal. [employer] pacemaker was interrogated without any noted cardiac events.    Seen today following session with PT. He denies pain. Removed bandage on head. Dressing C,D,I and staples in place with minimal dried sanguinous drainage. When asked about the fall, he reports he did not loose consciousness, and he was in the bathroom and does not remember how he fell. He reports he does not remember feeling dizzy prior. He was instructed to follow up for staple removal in 7-10 days, and he set up an appointment with his primary care doctor through [facility]. When informed he has primary care available to him through [facility], he did confirm he would like to continue primary care with [facility].     Recently, he was started on empagliflozin for diabetes and pt questioned how he was feeling since this medication started. He reports he has been feeling okay. We did discuss how he continues to have blood sugars above 300-400s. Pt agreeable to increase lantus and increase empagliflozin.     Otherwise, he has no complaints. He still experiences back pain which is relieved with dilaudid.    ALLERGIES:Cilostazol, Acetaminophen, Amlodipine, Hydrocodone, and Vicodin  [hydrocodone-acetaminophen]  PAST MEDICAL HISTORY:   Past Medical History:   Diagnosis Date     CAD (coronary artery disease)     s/p CABG 1999, 2011     Chronic diastolic congestive heart failure (H)      CKD (chronic kidney disease) stage 3, GFR 30-59 ml/min (H)      Diabetes mellitus, type 2 (H)      Dyslipidemia      Gastroesophageal reflux disease      Hypertension      PAD (peripheral artery disease) (H)      Recurrent falls      Sick sinus syndrome (H)     s/p pacemaker     Thrombocytopenia (H)      PAST SURGICAL HISTORY:   has a past surgical history that includes CABG, VEIN, SINGLE; CORONARY STENT PERCUT, INITIAL VESSEL; appendectomy; Arthroscopy knee (Left); Total Knee Arthroplasty (Left); Lumbar Laminectomy; Tonsillectomy; joint replacement; Cardiac surgery; and Colonoscopy w/wo Brush **Performed** (N/A, 1/3/2019).  FAMILY HISTORY: family history is not on file.  SOCIAL HISTORY:  reports that he quit smoking about 62 years ago. He has a 45.00 pack-year smoking history. He has never used smokeless tobacco. He reports that he does not currently use alcohol. He reports that he does not use drugs.    MEDICATIONS:  MED REC REQUIRED{Post Medication Reconciliation Status: discharge medications reconciled and changed, per note/orders         Review of your medicines          Accurate as of July 27, 2023 11:59 PM. If you have any questions, ask your nurse or doctor.            CONTINUE these medicines which have NOT CHANGED      Dose / Directions   * acetaminophen 500 MG tablet  Commonly known as: TYLENOL      Dose: 500 mg  Take 500 mg by mouth every 4 hours as needed AND 1 TAB PO Q4H PRN  Refills: 0     * acetaminophen 500 MG tablet  Commonly known as: TYLENOL      Dose: 1,000 mg  Take 1,000 mg by mouth 3 times daily  Refills: 0     alendronate 70 MG tablet  Commonly known as: FOSAMAX      Dose: 70 mg  Take 70 mg by mouth every 7 days Sit upright 30 min after  Refills: 0     amLODIPine 5 MG tablet  Commonly known  as: NORVASC      Dose: 5 mg  Take 5 mg by mouth daily  Refills: 0     aspirin 81 MG EC tablet  Commonly known as: ASA      Dose: 81 mg  [ASPIRIN 81 MG EC TABLET] Take 81 mg by mouth daily.  Refills: 0     atorvastatin 40 MG tablet  Commonly known as: LIPITOR      Dose: 40 mg  Take 40 mg by mouth every evening  Refills: 0     carvedilol 25 MG tablet  Commonly known as: COREG      Dose: 25 mg  Take 25 mg by mouth 2 times daily  Refills: 0     citalopram 10 MG tablet  Commonly known as: celeXA      Dose: 20 mg  Take 20 mg by mouth daily  Refills: 0     clopidogrel 75 MG tablet  Commonly known as: PLAVIX      Dose: 1 tablet  Take 1 tablet by mouth every morning  Refills: 0     empagliflozin 10 MG Tabs tablet  Commonly known as: JARDIANCE      Dose: 10 mg  Take 10 mg by mouth daily  Refills: 0     furosemide 20 MG tablet  Commonly known as: LASIX      Dose: 20 mg  Take 20 mg by mouth  Refills: 0     hydrALAZINE 25 MG tablet  Commonly known as: APRESOLINE      Dose: 75 mg  Take 75 mg by mouth 2 times daily  Refills: 0     HYDROmorphone 2 MG tablet  Commonly known as: DILAUDID  Used for: Closed compression fracture of L2 lumbar vertebra with routine healing, subsequent encounter      Dose: 2 mg  Take 1 tablet (2 mg) by mouth every 8 hours as needed for pain or severe pain  Quantity: 30 tablet  Refills: 0     ICaps AREDS Formula Tabs      Dose: 1 capsule  [VITAMINS  A,C,E-ZINC-COPPER (ICAPS AREDS) 7,160-113-100 UNIT-MG-UNIT TBEC] Take 1 capsule by mouth daily.  Refills: 0     insulin aspart 100 UNITS/ML vial  Commonly known as: NovoLOG VIAL      Inject Subcutaneous 3 times daily (with meals) Inject as per sliding scale: if 200 - 250 = 2 units ; 251 - 300  = 4 units; 301 - 350 = 6 units; 351 - 400 = 8 units; 401  - 450 = 10 units; 451+ = 12 units  Refills: 0     insulin glargine 100 UNIT/ML pen  Commonly known as: LANTUS PEN      Dose: 10 Units  Inject 10 Units Subcutaneous At Bedtime  Quantity: 15 mL  Refills: 0     *  isosorbide mononitrate 30 MG 24 hr tablet  Commonly known as: IMDUR      Dose: 30 mg  Take 30 mg by mouth daily Give with 120 mg tab  Refills: 0     * isosorbide mononitrate  MG 24 HR ER tablet  Commonly known as: IMDUR      Dose: 120 mg  Take 120 mg by mouth  Refills: 0     Lidocaine 4 % Patch  Commonly known as: LIDOCARE      Dose: 1 patch  Place 1 patch onto the skin every 24 hours To prevent lidocaine toxicity, patient should be patch free for 12 hrs daily.  Refills: 0     magnesium chloride 535 (64 Mg) MG Tbec CR tablet      Dose: 535 mg  Take 535 mg by mouth daily  Refills: 0     metFORMIN 500 MG 24 hr tablet  Commonly known as: GLUCOPHAGE XR  Indication: Type 2 Diabetes      Dose: 1,000 mg  Take 1,000 mg by mouth daily (with dinner)  Refills: 0     mirtazapine 7.5 MG tablet  Commonly known as: REMERON      Dose: 7.5 mg  Take 7.5 mg by mouth At Bedtime  Refills: 0     nitroGLYcerin 0.4 MG sublingual tablet  Commonly known as: NITROSTAT      Dose: 0.4 mg  [NITROGLYCERIN (NITROSTAT) 0.4 MG SL TABLET] Place 0.4 mg under the tongue as needed.  Refills: 0     ondansetron 8 MG tablet  Commonly known as: ZOFRAN      Dose: 8 mg  Take 8 mg by mouth every 8 hours as needed for nausea  Refills: 0     pantoprazole 20 MG EC tablet  Commonly known as: PROTONIX      Dose: 20 mg  Take 20 mg by mouth every other day  Refills: 0     polyethylene glycol 17 g packet  Commonly known as: MIRALAX      Dose: 17 g  Take 17 g by mouth daily as needed for constipation  Refills: 0     senna-docusate 8.6-50 MG tablet  Commonly known as: SENOKOT-S/PERICOLACE      Dose: 2 tablet  Take 2 tablets by mouth 2 times daily  Refills: 0     terazosin 10 MG capsule  Commonly known as: HYTRIN      Dose: 1 capsule  Take 1 capsule by mouth At Bedtime  Refills: 0         * This list has 4 medication(s) that are the same as other medications prescribed for you. Read the directions carefully, and ask your doctor or other care provider to review them  with you.                 Case Management:  I have reviewed the care plan and MDS and do agree with the plan. Patient's desire to return to the community is not present. Information reviewed:  Medications, vital signs, orders, and nursing notes.    ROS:  10 point ROS of systems including Constitutional, Eyes, Respiratory, Cardiovascular, Gastroenterology, Genitourinary, Integumentary, Musculoskeletal, Psychiatric were all negative except for pertinent positives noted in my HPI.    Vitals:  BP (!) 166/59   Pulse 77   Temp 98.6  F (37  C)   Resp 18   Wt 61 kg (134 lb 6.4 oz)   SpO2 94%   BMI 21.05 kg/m    Body mass index is 21.05 kg/m .  Exam:  GENERAL APPEARANCE:  Alert, in no distress  ENT:  Mouth and posterior oropharynx normal, moist mucous membranes  HEAD: 5 cm laceration, staples in place, minimal dried sanguinous drainage   EYES:  EOM, conjunctivae, lids, pupils and irises normal  RESP:  respiratory effort and palpation of chest normal, lungs clear to auscultation   CV:  Palpation and auscultation of heart done , regular rate and rhythm, no murmur, rub, or gallop, no edema  ABDOMEN:  normal bowel sounds, soft, nontender, no hepatosplenomegaly or other masses  M/S:   TLSO brace on, walking Ind with 4WW  NEURO:   Cranial nerves 2-12 are normal tested and grossly at patient's baseline  PSYCH:  oriented X 3, normal insight, judgement and memory, affect and mood normal    Lab/Diagnostic data:     Most Recent 3 CBC's:  Recent Labs   Lab Test 06/13/23  0521 06/12/23  1701 06/12/23  0731   WBC 4.3 4.3 3.8*   HGB 9.6* 10.7* 10.5*    100 103*   * 120* 105*     Most Recent 3 BMP's:  Recent Labs   Lab Test 07/26/23  1323 07/13/23  0940 06/13/23  1223 06/13/23  0755 06/13/23  0521    139  --   --  141   POTASSIUM 5.0 5.0  --   --  4.2   CHLORIDE 105 106  --   --  107   CO2 21* 22  --   --  22   BUN 55.9* 52.6*  --   --  46.4*   CR 1.26* 1.20*  --   --  1.19*   ANIONGAP 12 11  --   --  12   VINAYAK 8.7  8.3  --   --  8.8   * 202* 141*   < > 132*    < > = values in this interval not displayed.       ASSESSMENT/PLAN  (E11.65,  Z79.4) Type 2 diabetes mellitus with hyperglycemia, with long-term current use of insulin (H)  (primary encounter diagnosis)  Comment: Blood sugar range 108-396. Pt reports his appetite is good. Empagliflozin 10 mg PO started on 7/17. Weight continues to range from 135 lb to 137 lbs.   Plan:   -Continue Metformin 1000 mg BID  -Increase Lantus from 11 units to 13 units subcutaneous at bedtime  -Increase empagliflozin from 10 mg to 25 mg PO  -Consider DPP4 should pt experience weight loss     (E83.42) Hypomagnesemia  Comment: High blood sugar levels could be contributing to increased magnesium excretion. Protonix could also be contributing to decreased magnesium absorption. Rechecked and improved since starting Magnesium chloride.   Plan:   -Continue Protonix 20 mg PO every other day, monitor for GI bleeding  -Continue magnesium chloride 535 mg delayed release 1 tab daily     (S32.020D) Closed compression fracture of L2 lumbar vertebra with routine healing, subsequent encounter  (R52) Pain  (M80.08XD) Age-related osteoporosis with current pathological fracture, vertebra(e), subsequent encounter for fracture with routine healing  Comment: Dexascan on 6/16/23 revealed osteoporosis. Pt continues to report higher levels of pain and needing dilaudid. He is able to sleep. Neurosurgery not surprised pt has had ongoing pain given worsening height loss of L4.   Plan:   -Continue custom TLSO brace per neurosurgery recommendations   -Repeat xrays in 6 weeks with neurosurgery  -Continue Lidocain patch 4% 1 patch daily (on 12 hours, off 12 hours)  -Continue Tylenol 1000 mg PO TID  -Continue Dilaudid 2 mg q 8 hours PRN severe pain   -Continue Alendronate 70 mg q 7days     (S01.01XD) Laceration of scalp without foreign body, subsequent encounter  Comment: Pt reports he already has follow up appointment  at [facility] for staple removal   Plan:   -Continue dressings as previously ordered    Orders:  -Change lantus from 11 units to 13 units  -Increase Empagliflozin from 10 mg PO daily to 25 mg PO daily     Electronically signed by:  PAULINA Buitrago CNP

## 2023-08-10 NOTE — PROGRESS NOTES
[facility] GERIATRICS  Chief Complaint   Patient presents with    senior care Regulatory     [place] Medical Record Number:  [id number]  Place of Service where encounter took place:  [facility] () [[id number]]    HPI:    Barak Berry  is 89 year old (1932), former , olimpia (1932), with pmhx including recent NSTEMI, prior CABG (2x), HFpEF, PAD, T2DM, falls who is being seen today for a federally mandated E/M visit.     He recently was seen in the ED  and 723/23 after fall and head laceration.  Initially seen 2023 for complete work-up.  Overall no concerning findings except for laceration.  This was closed with staples.  Unfortunately he still had significant bleeding follow this and return to the ED  and had wound gel and additional staples placed.  Had no further concerns.  Was seen by my colleague following this for further evaluation.  Overall healing well.  Staples were removed appropriately.    Today he is doing well and has no concerns.  His head is feeling okay has no significant headaches.  He has had no other falls since that time.  He likes to go on walks around the unit and has been able to go on walks outside recently.  Sounds as though he is doing occupational therapy related to difficulties with dressing.  He did note some ankle pain particularly in his calves though also has been going to the gym with therapies more recently.    His appetite is good and he has been eating well.  His weights have been stable.  He has no chest pain or pressure, no shortness of breath.  No orthostatic symptoms.  He does get up to go to the bathroom at night is continent of bowel and bladder.  He does have a TLSO brace sitting next to him and notes he does not enjoy wearing it.  I did review that appears as though recommendation is to wear for 6 months.  Says his back feels about the same as before.    Pointed out a new care culture his son had drawn for  him years ago now on his wall.  He also was a huge [employer] fan and has a couple posters up now as well.    ALLERGIES:Cilostazol, Acetaminophen, Amlodipine, Hydrocodone, and Vicodin [hydrocodone-acetaminophen]  PAST MEDICAL HISTORY:   Past Medical History:   Diagnosis Date    CAD (coronary artery disease)     s/p CABG 1999, 2011    Chronic diastolic congestive heart failure (H)     CKD (chronic kidney disease) stage 3, GFR 30-59 ml/min (H)     Diabetes mellitus, type 2 (H)     Dyslipidemia     Gastroesophageal reflux disease     Hypertension     PAD (peripheral artery disease) (H)     Recurrent falls     Sick sinus syndrome (H)     s/p pacemaker    Thrombocytopenia (H)      PAST SURGICAL HISTORY:   has a past surgical history that includes CABG, VEIN, SINGLE; CORONARY STENT PERCUT, INITIAL VESSEL; appendectomy; Arthroscopy knee (Left); Total Knee Arthroplasty (Left); Lumbar Laminectomy; Tonsillectomy; joint replacement; Cardiac surgery; and Colonoscopy w/wo Brush **Performed** (N/A, 1/3/2019).  FAMILY HISTORY: family history is not on file.  SOCIAL HISTORY:  reports that he quit smoking about 62 years ago. He has a 45.00 pack-year smoking history. He has never used smokeless tobacco. He reports that he does not currently use alcohol. He reports that he does not use drugs.    MEDICATIONS:  MED REC REQUIRED  Post Medication Reconciliation Status: medication reconcilation previously completed during another office visit         Review of your medicines            Accurate as of August 10, 2023 11:59 PM. If you have any questions, ask your nurse or doctor.                CONTINUE these medicines which may have CHANGED, or have new prescriptions. If we are uncertain of the size of tablets/capsules you have at home, strength may be listed as something that might have changed.        Dose / Directions   hydrALAZINE 25 MG tablet  Commonly known as: APRESOLINE  This may have changed: how much to take  Changed by:  Benjamin Rosenstein, MD      Dose: 50 mg  Take 2 tablets (50 mg) by mouth 2 times daily  Refills: 0            CONTINUE these medicines which have NOT CHANGED        Dose / Directions   * acetaminophen 500 MG tablet  Commonly known as: TYLENOL      Dose: 500 mg  Take 500 mg by mouth every 4 hours as needed AND 1 TAB PO Q4H PRN  Refills: 0     * acetaminophen 500 MG tablet  Commonly known as: TYLENOL      Dose: 1,000 mg  Take 1,000 mg by mouth 3 times daily  Refills: 0     alendronate 70 MG tablet  Commonly known as: FOSAMAX      Dose: 70 mg  Take 70 mg by mouth every 7 days Sit upright 30 min after  Refills: 0     amLODIPine 5 MG tablet  Commonly known as: NORVASC      Dose: 5 mg  Take 5 mg by mouth daily  Refills: 0     aspirin 81 MG EC tablet  Commonly known as: ASA      Dose: 81 mg  [ASPIRIN 81 MG EC TABLET] Take 81 mg by mouth daily.  Refills: 0     atorvastatin 40 MG tablet  Commonly known as: LIPITOR      Dose: 40 mg  Take 40 mg by mouth every evening  Refills: 0     carvedilol 25 MG tablet  Commonly known as: COREG      Dose: 25 mg  Take 25 mg by mouth 2 times daily  Refills: 0     citalopram 10 MG tablet  Commonly known as: celeXA      Dose: 20 mg  Take 20 mg by mouth daily  Refills: 0     clopidogrel 75 MG tablet  Commonly known as: PLAVIX      Dose: 1 tablet  Take 1 tablet by mouth every morning  Refills: 0     empagliflozin 10 MG Tabs tablet  Commonly known as: JARDIANCE      Dose: 25 mg  Take 25 mg by mouth daily  Refills: 0     furosemide 20 MG tablet  Commonly known as: LASIX      Dose: 20 mg  Take 20 mg by mouth  Refills: 0     HYDROmorphone 2 MG tablet  Commonly known as: DILAUDID  Used for: Closed compression fracture of L2 lumbar vertebra with routine healing, subsequent encounter      Dose: 2 mg  Take 1 tablet (2 mg) by mouth every 8 hours as needed for pain or severe pain  Quantity: 30 tablet  Refills: 0     ICaps AREDS Formula Tabs      Dose: 1 capsule  [VITAMINS  A,C,E-ZINC-COPPER (ICAPS AREDS)  7,160-113-100 UNIT-MG-UNIT TBEC] Take 1 capsule by mouth daily.  Refills: 0     insulin aspart 100 UNITS/ML vial  Commonly known as: NovoLOG VIAL      Inject Subcutaneous 3 times daily (with meals) Inject as per sliding scale: if 200 - 250 = 2 units ; 251 - 300  = 4 units; 301 - 350 = 6 units; 351 - 400 = 8 units; 401  - 450 = 10 units; 451+ = 12 units  Refills: 0     insulin glargine 100 UNIT/ML pen  Commonly known as: LANTUS PEN      Dose: 13 Units  Inject 13 Units Subcutaneous At Bedtime  Quantity: 15 mL  Refills: 0     * isosorbide mononitrate 30 MG 24 hr tablet  Commonly known as: IMDUR      Dose: 30 mg  Take 30 mg by mouth daily Give with 120 mg tab  Refills: 0     * isosorbide mononitrate  MG 24 HR ER tablet  Commonly known as: IMDUR      Dose: 120 mg  Take 120 mg by mouth  Refills: 0     Lidocaine 4 % Patch  Commonly known as: LIDOCARE      Dose: 1 patch  Place 1 patch onto the skin every 24 hours To prevent lidocaine toxicity, patient should be patch free for 12 hrs daily.  Refills: 0     magnesium chloride 535 (64 Mg) MG Tbec CR tablet      Dose: 535 mg  Take 535 mg by mouth daily  Refills: 0     metFORMIN 500 MG 24 hr tablet  Commonly known as: GLUCOPHAGE XR  Indication: Type 2 Diabetes      Dose: 1,000 mg  Take 1,000 mg by mouth daily (with dinner)  Refills: 0     mirtazapine 7.5 MG tablet  Commonly known as: REMERON      Dose: 7.5 mg  Take 7.5 mg by mouth At Bedtime  Refills: 0     nitroGLYcerin 0.4 MG sublingual tablet  Commonly known as: NITROSTAT      Dose: 0.4 mg  [NITROGLYCERIN (NITROSTAT) 0.4 MG SL TABLET] Place 0.4 mg under the tongue as needed.  Refills: 0     ondansetron 8 MG tablet  Commonly known as: ZOFRAN      Dose: 8 mg  Take 8 mg by mouth every 8 hours as needed for nausea  Refills: 0     pantoprazole 20 MG EC tablet  Commonly known as: PROTONIX      Dose: 20 mg  Take 20 mg by mouth every other day  Refills: 0     polyethylene glycol 17 g packet  Commonly known as: MIRALAX       "Dose: 17 g  Take 17 g by mouth daily as needed for constipation  Refills: 0     senna-docusate 8.6-50 MG tablet  Commonly known as: SENOKOT-S/PERICOLACE      Dose: 2 tablet  Take 2 tablets by mouth 2 times daily  Refills: 0     terazosin 10 MG capsule  Commonly known as: HYTRIN      Dose: 1 capsule  Take 1 capsule by mouth At Bedtime  Refills: 0           * This list has 4 medication(s) that are the same as other medications prescribed for you. Read the directions carefully, and ask your doctor or other care provider to review them with you.                 Case Management:  I have reviewed the care plan and MDS and do agree with the plan. Patient's desire to return to the community is not present. Information reviewed:  Medications, vital signs, orders, and nursing notes.    Vitals:  BP (!) 166/58   Pulse 71   Temp 98.2  F (36.8  C)   Resp 16   Ht 1.702 m (5' 7\")   Wt 61.1 kg (134 lb 12.8 oz)   SpO2 94%   BMI 21.11 kg/m    Body mass index is 21.11 kg/m .  Exam:    GENERAL APPEARANCE: Laying in bed comfortably, NAD  HENT:  Nearly healed left parietal laceration, moderately [place], good dentition  EYES:  Conjunctiva clear, anicteric, EOMI, PERRL  PULM  Normal WOB on RA, lungs CTAB, no wheezes or crackle  CV:  RRR, S1/S2 normal, no murmurs; no LE edema  ABDOMEN: Abdomen soft, not tender, not distended, BS normal and active throughout   M/S:  Mild TTP of bilateral distal calf/solues - no significant calcaneal tendon tenderness  SKIN: Healing bruises and scalp laceration as previously noted  NEURO: Alert, answering questions appropriately, normal thought processes; CN II-XII grossly intact  PSYCH: Mood normal with congruent affect    Lab/Diagnostic data:   Recent labs in [facility] reviewed by me today.     ASSESSMENT/PLAN  (W19.XXXD) Fall, subsequent encounter  (primary encounter diagnosis)  (S01.01XD) Laceration of scalp without foreign body, subsequent encounter  Comment: Significant fall with head laceration seen in " the ED.  No ongoing sequela I.  Laceration healing appropriately.  Continues to work on strength and ambulation.  Plan:   -Decrease hydralazine to 25 mg twice daily from 50 mg, further decrease as able  -Increase amlodipine to 10 mg  -Could add ARB if needed    (R54) Frailty  Comment: Consistent with history of prior weight loss, falls, multi morbidity, and generalized deconditioning.  Overall relatively stable with supportive cares.  Plan:   -Continue with occupational therapy for ADL's  -Decrease medication burden as able    (M80.08XD) Age-related osteoporosis with current pathological fracture, vertebra(e), subsequent encounter for fracture with routine healing  (S32.020D) Closed compression fracture of L2 lumbar vertebra with routine healing, subsequent encounter  Comment: DEXA scan obtained with no T-scores less than -2.5.  However T score -2 and given history of fracture, and significant elevated FRAX score, consistent with osteoporosis.  Started on alendronate therapy.  Tolerating well.  Plan:   -Consider repeat DEXA in 2 years    (E44.0) Moderate protein-calorie malnutrition (H)  (R63.4) Weight loss  Comment: Does have generalized muscle wasting.  However overall weights have been stable approximately 135 pounds.  Does note good appetite and has been eating meals well.  No dysphagia symptoms.  Plan:   -Continue to monitor  -Diet as tolerated    (E11.65,  Z79.4) Type 2 diabetes mellitus with hyperglycemia, with long-term current use of insulin (H)  Comment: Notably was admitted in June for significant hypoglycemia.  No further episodes of hypoglycemia, relative hyperglycemia but appropriate for age and function.  Plan:   -Continue Lantus 13 units, empagliflozin, and metformin    Orders  -Decrease hydralazin to 25mg BID  -Increase amlodipine to 10mg daily    Electronically signed by:    Benjamin Rosenstein, MD, MA  [facility]    This note was completed with the assistance of dictation  software. Typos and word substitution-errors are expected and unintended.

## 2023-08-10 NOTE — LETTER
8/10/2023        RE: Barak Berry  [address] [place] [address]        M [facility] GERIATRICS  Chief Complaint   Patient presents with     assisted Regulatory     [place] Medical Record Number:  [id number]  Place of Service where encounter took place:  [facility] () [[id number]]    HPI:    Barak Berry  is 89 year old (1932), former , olimpia (1932), with pmhx including recent NSTEMI, prior CABG (2x), HFpEF, PAD, T2DM, falls who is being seen today for a federally mandated E/M visit.     He recently was seen in the ED  and 723/23 after fall and head laceration.  Initially seen 2023 for complete work-up.  Overall no concerning findings except for laceration.  This was closed with staples.  Unfortunately he still had significant bleeding follow this and return to the ED  and had wound gel and additional staples placed.  Had no further concerns.  Was seen by my colleague following this for further evaluation.  Overall healing well.  Staples were removed appropriately.    Today he is doing well and has no concerns.  His head is feeling okay has no significant headaches.  He has had no other falls since that time.  He likes to go on walks around the unit and has been able to go on walks outside recently.  Sounds as though he is doing occupational therapy related to difficulties with dressing.  He did note some ankle pain particularly in his calves though also has been going to the gym with therapies more recently.    His appetite is good and he has been eating well.  His weights have been stable.  He has no chest pain or pressure, no shortness of breath.  No orthostatic symptoms.  He does get up to go to the bathroom at night is continent of bowel and bladder.  He does have a TLSO brace sitting next to him and notes he does not enjoy wearing it.  I did review that appears as though recommendation is to wear for 6 months.  Says his  back feels about the same as before.    Pointed out a new care culture his son had drawn for him years ago now on his wall.  He also was a huge [employer] fan and has a couple posters up now as well.    ALLERGIES:Cilostazol, Acetaminophen, Amlodipine, Hydrocodone, and Vicodin [hydrocodone-acetaminophen]  PAST MEDICAL HISTORY:   Past Medical History:   Diagnosis Date     CAD (coronary artery disease)     s/p CABG 1999, 2011     Chronic diastolic congestive heart failure (H)      CKD (chronic kidney disease) stage 3, GFR 30-59 ml/min (H)      Diabetes mellitus, type 2 (H)      Dyslipidemia      Gastroesophageal reflux disease      Hypertension      PAD (peripheral artery disease) (H)      Recurrent falls      Sick sinus syndrome (H)     s/p pacemaker     Thrombocytopenia (H)      PAST SURGICAL HISTORY:   has a past surgical history that includes CABG, VEIN, SINGLE; CORONARY STENT PERCUT, INITIAL VESSEL; appendectomy; Arthroscopy knee (Left); Total Knee Arthroplasty (Left); Lumbar Laminectomy; Tonsillectomy; joint replacement; Cardiac surgery; and Colonoscopy w/wo Brush **Performed** (N/A, 1/3/2019).  FAMILY HISTORY: family history is not on file.  SOCIAL HISTORY:  reports that he quit smoking about 62 years ago. He has a 45.00 pack-year smoking history. He has never used smokeless tobacco. He reports that he does not currently use alcohol. He reports that he does not use drugs.    MEDICATIONS:  MED REC REQUIRED  Post Medication Reconciliation Status: medication reconcilation previously completed during another office visit         Review of your medicines            Accurate as of August 10, 2023 11:59 PM. If you have any questions, ask your nurse or doctor.                CONTINUE these medicines which may have CHANGED, or have new prescriptions. If we are uncertain of the size of tablets/capsules you have at home, strength may be listed as something that might have changed.        Dose / Directions   hydrALAZINE  25 MG tablet  Commonly known as: APRESOLINE  This may have changed: how much to take  Changed by: Benjamin Rosenstein, MD      Dose: 50 mg  Take 2 tablets (50 mg) by mouth 2 times daily  Refills: 0            CONTINUE these medicines which have NOT CHANGED        Dose / Directions   * acetaminophen 500 MG tablet  Commonly known as: TYLENOL      Dose: 500 mg  Take 500 mg by mouth every 4 hours as needed AND 1 TAB PO Q4H PRN  Refills: 0     * acetaminophen 500 MG tablet  Commonly known as: TYLENOL      Dose: 1,000 mg  Take 1,000 mg by mouth 3 times daily  Refills: 0     alendronate 70 MG tablet  Commonly known as: FOSAMAX      Dose: 70 mg  Take 70 mg by mouth every 7 days Sit upright 30 min after  Refills: 0     amLODIPine 5 MG tablet  Commonly known as: NORVASC      Dose: 5 mg  Take 5 mg by mouth daily  Refills: 0     aspirin 81 MG EC tablet  Commonly known as: ASA      Dose: 81 mg  [ASPIRIN 81 MG EC TABLET] Take 81 mg by mouth daily.  Refills: 0     atorvastatin 40 MG tablet  Commonly known as: LIPITOR      Dose: 40 mg  Take 40 mg by mouth every evening  Refills: 0     carvedilol 25 MG tablet  Commonly known as: COREG      Dose: 25 mg  Take 25 mg by mouth 2 times daily  Refills: 0     citalopram 10 MG tablet  Commonly known as: celeXA      Dose: 20 mg  Take 20 mg by mouth daily  Refills: 0     clopidogrel 75 MG tablet  Commonly known as: PLAVIX      Dose: 1 tablet  Take 1 tablet by mouth every morning  Refills: 0     empagliflozin 10 MG Tabs tablet  Commonly known as: JARDIANCE      Dose: 25 mg  Take 25 mg by mouth daily  Refills: 0     furosemide 20 MG tablet  Commonly known as: LASIX      Dose: 20 mg  Take 20 mg by mouth  Refills: 0     HYDROmorphone 2 MG tablet  Commonly known as: DILAUDID  Used for: Closed compression fracture of L2 lumbar vertebra with routine healing, subsequent encounter      Dose: 2 mg  Take 1 tablet (2 mg) by mouth every 8 hours as needed for pain or severe pain  Quantity: 30  tablet  Refills: 0     ICaps AREDS Formula Tabs      Dose: 1 capsule  [VITAMINS  A,C,E-ZINC-COPPER (ICAPS AREDS) 7,160-113-100 UNIT-MG-UNIT TBEC] Take 1 capsule by mouth daily.  Refills: 0     insulin aspart 100 UNITS/ML vial  Commonly known as: NovoLOG VIAL      Inject Subcutaneous 3 times daily (with meals) Inject as per sliding scale: if 200 - 250 = 2 units ; 251 - 300  = 4 units; 301 - 350 = 6 units; 351 - 400 = 8 units; 401  - 450 = 10 units; 451+ = 12 units  Refills: 0     insulin glargine 100 UNIT/ML pen  Commonly known as: LANTUS PEN      Dose: 13 Units  Inject 13 Units Subcutaneous At Bedtime  Quantity: 15 mL  Refills: 0     * isosorbide mononitrate 30 MG 24 hr tablet  Commonly known as: IMDUR      Dose: 30 mg  Take 30 mg by mouth daily Give with 120 mg tab  Refills: 0     * isosorbide mononitrate  MG 24 HR ER tablet  Commonly known as: IMDUR      Dose: 120 mg  Take 120 mg by mouth  Refills: 0     Lidocaine 4 % Patch  Commonly known as: LIDOCARE      Dose: 1 patch  Place 1 patch onto the skin every 24 hours To prevent lidocaine toxicity, patient should be patch free for 12 hrs daily.  Refills: 0     magnesium chloride 535 (64 Mg) MG Tbec CR tablet      Dose: 535 mg  Take 535 mg by mouth daily  Refills: 0     metFORMIN 500 MG 24 hr tablet  Commonly known as: GLUCOPHAGE XR  Indication: Type 2 Diabetes      Dose: 1,000 mg  Take 1,000 mg by mouth daily (with dinner)  Refills: 0     mirtazapine 7.5 MG tablet  Commonly known as: REMERON      Dose: 7.5 mg  Take 7.5 mg by mouth At Bedtime  Refills: 0     nitroGLYcerin 0.4 MG sublingual tablet  Commonly known as: NITROSTAT      Dose: 0.4 mg  [NITROGLYCERIN (NITROSTAT) 0.4 MG SL TABLET] Place 0.4 mg under the tongue as needed.  Refills: 0     ondansetron 8 MG tablet  Commonly known as: ZOFRAN      Dose: 8 mg  Take 8 mg by mouth every 8 hours as needed for nausea  Refills: 0     pantoprazole 20 MG EC tablet  Commonly known as: PROTONIX      Dose: 20 mg  Take 20  "mg by mouth every other day  Refills: 0     polyethylene glycol 17 g packet  Commonly known as: MIRALAX      Dose: 17 g  Take 17 g by mouth daily as needed for constipation  Refills: 0     senna-docusate 8.6-50 MG tablet  Commonly known as: SENOKOT-S/PERICOLACE      Dose: 2 tablet  Take 2 tablets by mouth 2 times daily  Refills: 0     terazosin 10 MG capsule  Commonly known as: HYTRIN      Dose: 1 capsule  Take 1 capsule by mouth At Bedtime  Refills: 0           * This list has 4 medication(s) that are the same as other medications prescribed for you. Read the directions carefully, and ask your doctor or other care provider to review them with you.                 Case Management:  I have reviewed the care plan and MDS and do agree with the plan. Patient's desire to return to the community is not present. Information reviewed:  Medications, vital signs, orders, and nursing notes.    Vitals:  BP (!) 166/58   Pulse 71   Temp 98.2  F (36.8  C)   Resp 16   Ht 1.702 m (5' 7\")   Wt 61.1 kg (134 lb 12.8 oz)   SpO2 94%   BMI 21.11 kg/m    Body mass index is 21.11 kg/m .  Exam:    GENERAL APPEARANCE: Laying in bed comfortably, NAD  HENT:  Nearly healed left parietal laceration, moderately [place], good dentition  EYES:  Conjunctiva clear, anicteric, EOMI, PERRL  PULM  Normal WOB on RA, lungs CTAB, no wheezes or crackle  CV:  RRR, S1/S2 normal, no murmurs; no LE edema  ABDOMEN: Abdomen soft, not tender, not distended, BS normal and active throughout   M/S:  Mild TTP of bilateral distal calf/solues - no significant calcaneal tendon tenderness  SKIN: Healing bruises and scalp laceration as previously noted  NEURO: Alert, answering questions appropriately, normal thought processes; CN II-XII grossly intact  PSYCH: Mood normal with congruent affect    Lab/Diagnostic data:   Recent labs in [facility] reviewed by me today.     ASSESSMENT/PLAN  (W19.XXXD) Fall, subsequent encounter  (primary encounter diagnosis)  (S01.01XD) Laceration " of scalp without foreign body, subsequent encounter  Comment: Significant fall with head laceration seen in the ED.  No ongoing sequela I.  Laceration healing appropriately.  Continues to work on strength and ambulation.  Plan:   -Decrease hydralazine to 25 mg twice daily from 50 mg, further decrease as able  -Increase amlodipine to 10 mg  -Could add ARB if needed    (R54) Frailty  Comment: Consistent with history of prior weight loss, falls, multi morbidity, and generalized deconditioning.  Overall relatively stable with supportive cares.  Plan:   -Continue with occupational therapy for ADL's  -Decrease medication burden as able    (M80.08XD) Age-related osteoporosis with current pathological fracture, vertebra(e), subsequent encounter for fracture with routine healing  (S32.020D) Closed compression fracture of L2 lumbar vertebra with routine healing, subsequent encounter  Comment: DEXA scan obtained with no T-scores less than -2.5.  However T score -2 and given history of fracture, and significant elevated FRAX score, consistent with osteoporosis.  Started on alendronate therapy.  Tolerating well.  Plan:   -Consider repeat DEXA in 2 years    (E44.0) Moderate protein-calorie malnutrition (H)  (R63.4) Weight loss  Comment: Does have generalized muscle wasting.  However overall weights have been stable approximately 135 pounds.  Does note good appetite and has been eating meals well.  No dysphagia symptoms.  Plan:   -Continue to monitor  -Diet as tolerated    (E11.65,  Z79.4) Type 2 diabetes mellitus with hyperglycemia, with long-term current use of insulin (H)  Comment: Notably was admitted in June for significant hypoglycemia.  No further episodes of hypoglycemia, relative hyperglycemia but appropriate for age and function.  Plan:   -Continue Lantus 13 units, empagliflozin, and metformin    Orders  -Decrease hydralazin to 25mg BID  -Increase amlodipine to 10mg daily    Electronically signed by:    Jd  Rosenstein, MD, MA  [facility]    This note was completed with the assistance of dictation software. Typos and word substitution-errors are expected and unintended.                Sincerely,        Benjamin Rosenstein, MD

## 2023-08-22 NOTE — TELEPHONE ENCOUNTER
[facility] Geriatrics Triage Nurse Telephone Encounter    Provider: Hammad Dubois PA-C  Facility: [facility] Facility Type:  [facility]    Caller: Samuel  Call Back Number: [phone]    Allergies:    Allergies   Allergen Reactions    Cilostazol Unknown     Other reaction(s): Tachycardia, tachycardia, chest pain, palpitation  Chest pressure/heart pounding      Acetaminophen Nausea and Vomiting     Other reaction(s): Nausea/vomiting  Other reaction(s): Nausea/vomiting    Amlodipine Unknown     Other reaction(s): Edema    Hydrocodone      Other reaction(s): Nausea/vomiting    Vicodin [Hydrocodone-Acetaminophen] Unknown        Reason for call: Nurse is calling on behalf of patient's request to discontinue Lidocaine patches.  Patient states they are not effective.  Patient would also like to go over his diabetic regimen with the provider at their next visit.      Verbal Order/Direction given by Provider: Discontinue lidocaine patches per patient request.  Provider to see patient on 8/25/23.      Provider giving Order:  Hammad Dubois PA-C    Verbal Order given to: Samuel Torrez RN

## 2023-08-25 NOTE — LETTER
8/25/2023        RE: Barak Berry  [address] [place] [address]        M [facility] GERIATRICS    Chief Complaint   Patient presents with     RECHECK     HPI:  Barak Berry is a 89 year old  (1/6/1932), who is being seen today for an episodic care visit at: [facility] () [[id number]].     Pt is a 89yo male with PMHx CAD s/p CABGx2 and recent NSTEMI, HFpEF, PAD, DMII, frequent falls who requested a visit today to review diabetes medications. Daughter is present at bedside and participates in visit. He reports previously requiring a great deal more of insulin, is concerned he is not receiving as much since admission at [facility]. We reviewed his blood sugar trends, noting his blood sugars tend to trend upwards of 250 in afternoons but otherwise have been stable. When at home, he was testing blood sugars four times daily and using prandial as well as long-acting medication. Previously was on 11u of lantus nightly, now is on 13u. We reviewed that in June he was admitted for hypoglycemia and insulin requirements noted to be decreased than prior. Pt appreciated reassurance, would like to continue testing blood sugars four times daily to avoid hyper/hypo-glycemia. Is agreeable to adjusting long-acting insulin to have some in the morning but would prefer to keep the dosing at bedtime.     Allergies, and PMH/PSH reviewed in EPIC today.  REVIEW OF SYSTEMS:  4 point ROS including Respiratory, CV, GI and , other than that noted in the HPI,  is negative    Objective:   /53   Pulse 78   Temp 98.7  F (37.1  C)   Resp 18   Wt 61.5 kg (135 lb 9.6 oz)   SpO2 94%   BMI 21.24 kg/m    GEN: well-developed, well-nourished, appears comfortable  HEENT: NCAT, EOM intact bilaterally, sclera clear, conjunctiva normal, nose & mouth patent, mucous membranes moist  CHEST: lungs CTA bilaterally, no increased work of breathing, no wheeze, crackles, rhonchi  HEART: RRR, S1 & S2, no murmur  ABD:  soft, nontender, nondistended, no guarding or rigidity, +BS in all 4 quadrants  MSK: AROM bilateral UE/LE, pedal & radial pulses 2+ bilaterally  NEURO: awake, alert, oriented to name, place, and time. CN II-XII grossly intact. Sensation grossly intact to light touch.   SKIN: warm & dry without rash, no pedal edema    Recent labs in EPIC reviewed by me today.     Assessment/Plan:    Diabetes mellitus type II  Pt reassured his blood sugar trends have been acceptable. Agreeable to changes in lantus regimen. Prefers to keep blood sugar checks four times daily to avoid hyper/hypoglycemia.  -change lantus to 2u qAM, 11u qPM with instructions to call provider for BG > 350, < 60  -Continue accuchecks TID AC & at bedtime     MED REC REQUIRED  Post Medication Reconciliation Status: patient was not discharged from an inpatient facility or TCU    Electronically signed by: Hammad Dubois PA-C          Sincerely,        Hammad Dubois PA-C

## 2023-09-06 NOTE — PROGRESS NOTES
[facility] GERIATRICS    Chief Complaint   Patient presents with    RECHECK     HPI:  Barak Berry is a 89 year old  (1/6/1932), who is being seen today for an episodic care visit at: [facility] () [[id number]].     Pt is a 89yo male with PMHx CAD s/p CABGx2 and recent NSTEMI, HFpEF, PAD, DMII, frequent falls who requested a visit today to review diabetes medications. Daughter is present at bedside and participates in visit. He reports previously requiring a great deal more of insulin, is concerned he is not receiving as much since admission at [facility]. We reviewed his blood sugar trends, noting his blood sugars tend to trend upwards of 250 in afternoons but otherwise have been stable. When at home, he was testing blood sugars four times daily and using prandial as well as long-acting medication. Previously was on 11u of lantus nightly, now is on 13u. We reviewed that in June he was admitted for hypoglycemia and insulin requirements noted to be decreased than prior. Pt appreciated reassurance, would like to continue testing blood sugars four times daily to avoid hyper/hypo-glycemia. Is agreeable to adjusting long-acting insulin to have some in the morning but would prefer to keep the dosing at bedtime.     Allergies, and PMH/PSH reviewed in EPIC today.  REVIEW OF SYSTEMS:  4 point ROS including Respiratory, CV, GI and , other than that noted in the HPI,  is negative    Objective:   /53   Pulse 78   Temp 98.7  F (37.1  C)   Resp 18   Wt 61.5 kg (135 lb 9.6 oz)   SpO2 94%   BMI 21.24 kg/m    GEN: well-developed, well-nourished, appears comfortable  HEENT: NCAT, EOM intact bilaterally, sclera clear, conjunctiva normal, nose & mouth patent, mucous membranes moist  CHEST: lungs CTA bilaterally, no increased work of breathing, no wheeze, crackles, rhonchi  HEART: RRR, S1 & S2, no murmur  ABD: soft, nontender, nondistended, no guarding or rigidity, +BS in all 4 quadrants  MSK: AROM  bilateral UE/LE, pedal & radial pulses 2+ bilaterally  NEURO: awake, alert, oriented to name, place, and time. CN II-XII grossly intact. Sensation grossly intact to light touch.   SKIN: warm & dry without rash, no pedal edema    Recent labs in EPIC reviewed by me today.     Assessment/Plan:    Diabetes mellitus type II  Pt reassured his blood sugar trends have been acceptable. Agreeable to changes in lantus regimen. Prefers to keep blood sugar checks four times daily to avoid hyper/hypoglycemia.  -change lantus to 2u qAM, 11u qPM with instructions to call provider for BG > 350, < 60  -Continue accuchecks TID AC & at bedtime     MED REC REQUIRED  Post Medication Reconciliation Status: patient was not discharged from an inpatient facility or TCU    Electronically signed by: Hammad Dubois PA-C

## 2023-10-10 NOTE — LETTER
10/10/2023        RE: Barak Berry  [address] [place] [address]        [facility]IATR[facility]  Chief Complaint   Patient presents with     shelter Regulatory     [place] Medical Record Number:  [id number]  Place of Service where encounter took place:  [facility] () [[id number]]    HPI:    Barak Berry  is 89 year old with PMHx CAD s/p CABGx2 and recent NSTEMI, HFpEF, PAD, DMII, frequent falls, who is being seen today for a federally mandated E/M visit.    Patient is seen resting in bed, TLSO brace in place. Reports he is generally doing well, denies significant pain related to recent compression fracture in back. Brace is the most uncomfortable aspect, has a follow-up scheduled within the next 1-2 weeks to discuss use and mobility.     Main concern today is his blood glucose trends, feels he is frequently seeing values in the 300s and would like improved control. Appetite has been adequate, reports overall symptoms of significant constipation, stating it has been almost a week since having a bowel movement then yesterday had diarrhea. Would like to have regular medications for constipation ordered.    ALLERGIES:Cilostazol, Acetaminophen, Amlodipine, Hydrocodone, and Vicodin [hydrocodone-acetaminophen]  PAST MEDICAL HISTORY:   Past Medical History:   Diagnosis Date     CAD (coronary artery disease)     s/p CABG 1999, 2011     Chronic diastolic congestive heart failure (H)      CKD (chronic kidney disease) stage 3, GFR 30-59 ml/min (H)      Diabetes mellitus, type 2 (H)      Dyslipidemia      Gastroesophageal reflux disease      Hypertension      PAD (peripheral artery disease) (H)      Recurrent falls      Sick sinus syndrome (H)     s/p pacemaker     Thrombocytopenia (H)      PAST SURGICAL HISTORY:   has a past surgical history that includes CABG, VEIN, SINGLE; CORONARY STENT PERCUT, INITIAL VESSEL; appendectomy; Arthroscopy knee (Left); Total Knee Arthroplasty  (Left); Lumbar Laminectomy; Tonsillectomy; joint replacement; Cardiac surgery; and Colonoscopy w/wo [place] **Performed** (N/A, 1/3/2019).  FAMILY HISTORY: family history is not on file.  SOCIAL HISTORY:  reports that he quit smoking about 62 years ago. He has a 45.00 pack-year smoking history. He has never used smokeless tobacco. He reports that he does not currently use alcohol. He reports that he does not use drugs.    MEDICATIONS:  MED REC REQUIRED  Post Medication Reconciliation Status: patient was not discharged from an inpatient facility or TCU         Review of your medicines            Accurate as of October 10, 2023 11:59 PM. If you have any questions, ask your nurse or doctor.                CONTINUE these medicines which may have CHANGED, or have new prescriptions. If we are uncertain of the size of tablets/capsules you have at home, strength may be listed as something that might have changed.        Dose / Directions   acetaminophen 500 MG tablet  Commonly known as: TYLENOL  This may have changed: Another medication with the same name was removed. Continue taking this medication, and follow the directions you see here.  Changed by: Hammad Dubois PA-C      Dose: 500 mg  Take 500 mg by mouth every 4 hours as needed AND 1 TAB PO Q4H PRN  Refills: 0     isosorbide mononitrate 30 MG 24 hr tablet  Commonly known as: IMDUR  This may have changed: Another medication with the same name was removed. Continue taking this medication, and follow the directions you see here.  Changed by: Hammad Dubois PA-C      Dose: 120 mg  Take 120 mg by mouth daily  Refills: 0     metFORMIN 500 MG 24 hr tablet  Commonly known as: GLUCOPHAGE XR  Indication: Type 2 Diabetes  This may have changed: Another medication with the same name was removed. Continue taking this medication, and follow the directions you see here.  Changed by: Du Torrez RN      Dose: 2,000 mg  Take 2,000 mg by mouth every morning  Refills: 0             CONTINUE these medicines which have NOT CHANGED        Dose / Directions   alendronate 70 MG tablet  Commonly known as: FOSAMAX      Dose: 70 mg  Take 70 mg by mouth every 7 days Sit upright 30 min after  Refills: 0     amLODIPine 5 MG tablet  Commonly known as: NORVASC      Dose: 10 mg  Take 10 mg by mouth daily  Refills: 0     aspirin 81 MG EC tablet  Commonly known as: ASA      Dose: 81 mg  [ASPIRIN 81 MG EC TABLET] Take 81 mg by mouth daily.  Refills: 0     atorvastatin 40 MG tablet  Commonly known as: LIPITOR      Dose: 40 mg  Take 40 mg by mouth every evening  Refills: 0     bisacodyl 10 MG suppository  Commonly known as: DULCOLAX  Indication: Constipation      Dose: 10 mg  Place 10 mg rectally daily as needed for constipation  Refills: 0     carvedilol 25 MG tablet  Commonly known as: COREG      Dose: 25 mg  Take 25 mg by mouth 2 times daily  Refills: 0     citalopram 10 MG tablet  Commonly known as: celeXA      Dose: 20 mg  Take 20 mg by mouth daily  Refills: 0     clopidogrel 75 MG tablet  Commonly known as: PLAVIX      Dose: 1 tablet  Take 1 tablet by mouth every morning  Refills: 0     empagliflozin 10 MG Tabs tablet  Commonly known as: JARDIANCE      Dose: 25 mg  Take 25 mg by mouth daily  Refills: 0     furosemide 20 MG tablet  Commonly known as: LASIX      Dose: 20 mg  Take 20 mg by mouth  Refills: 0     hydrALAZINE 25 MG tablet  Commonly known as: APRESOLINE      Dose: 25 mg  Take 25 mg by mouth 2 times daily  Refills: 0     HYDROmorphone 2 MG tablet  Commonly known as: DILAUDID  Used for: Closed compression fracture of L2 lumbar vertebra with routine healing, subsequent encounter      Dose: 2 mg  Take 1 tablet (2 mg) by mouth every 8 hours as needed for pain  Quantity: 90 tablet  Refills: 0     ICaps AREDS Formula Tabs      Dose: 1 capsule  [VITAMINS  A,C,E-ZINC-COPPER (ICAPS AREDS) 7,160-113-100 UNIT-MG-UNIT TBEC] Take 1 capsule by mouth daily.  Refills: 0     insulin glargine 100 UNIT/ML  "pen  Commonly known as: LANTUS PEN      Dose: 11 Units  Inject 11 Units Subcutaneous at bedtime And 5u qAM  Quantity: 15 mL  Refills: 0     magnesium chloride 535 (64 Mg) MG Tbec CR tablet      Dose: 535 mg  Take 535 mg by mouth daily  Refills: 0     mirtazapine 7.5 MG tablet  Commonly known as: REMERON      Dose: 7.5 mg  Take 7.5 mg by mouth At Bedtime  Refills: 0     nitroGLYcerin 0.4 MG sublingual tablet  Commonly known as: NITROSTAT      Dose: 0.4 mg  [NITROGLYCERIN (NITROSTAT) 0.4 MG SL TABLET] Place 0.4 mg under the tongue as needed.  Refills: 0     ondansetron 8 MG tablet  Commonly known as: ZOFRAN      Dose: 8 mg  Take 8 mg by mouth every 8 hours as needed for nausea  Refills: 0     pantoprazole 20 MG EC tablet  Commonly known as: PROTONIX      Dose: 20 mg  Take 20 mg by mouth every other day  Refills: 0     polyethylene glycol 17 g packet  Commonly known as: MIRALAX      Dose: 17 g  Take 17 g by mouth daily as needed for constipation  Refills: 0     senna-docusate 8.6-50 MG tablet  Commonly known as: SENOKOT-S/PERICOLACE  Indication: Constipation      Dose: 2 tablet  Take 2 tablets by mouth 2 times daily as needed for constipation  Refills: 0     tamsulosin 0.4 MG capsule  Commonly known as: FLOMAX      Dose: 0.4 mg  Take 0.4 mg by mouth daily  Refills: 0            STOP taking      insulin aspart 100 UNITS/ML vial  Commonly known as: NovoLOG VIAL  Stopped by: Hammad Dubois PA-C        terazosin 10 MG capsule  Commonly known as: HYTRIN  Stopped by: Hammad Dubois PA-C                 Case Management:  I have reviewed the care plan and MDS and do agree with the plan. Patient's desire to return to the community is not present. Information reviewed:  Medications, vital signs, orders, and nursing notes.    ROS:  4 point ROS including Respiratory, CV, GI and , other than that noted in the HPI,  is negative    Vitals:  BP (!) 157/56   Pulse 70   Temp 98  F (36.7  C)   Resp 18   Ht 1.702 m (5' 7\")   Wt " 62.4 kg (137 lb 9.6 oz)   SpO2 97%   BMI 21.55 kg/m    Body mass index is 21.55 kg/m .  Exam:  GEN: well-developed, well-nourished, appears comfortable  HEENT: NCAT, EOM intact bilaterally, sclera clear, conjunctiva normal, nose & mouth patent, mucous membranes moist  CHEST: lungs CTA bilaterally, no increased work of breathing, no wheeze, crackles, rhonchi  HEART: RRR, S1 & S2, no murmur  ABD: soft, nontender, nondistended, no guarding or rigidity, +BS in all 4 quadrants  MSK: AROM bilateral UE/LE, pedal & radial pulses 2+ bilaterally  NEURO: awake, alert, oriented to name, place, and time. CN II-XII grossly intact. Sensation grossly intact to light touch.   SKIN: warm & dry without rash, no pedal edema    Lab/Diagnostic data:   Recent labs in [facility] reviewed by me today.     ASSESSMENT/PLAN    Compression deformity, L2  Continues with TLSO brace, pain controlled.  -Continues on tylenol PRN, dilaudid PRN  -Follow-up with neurosurgery as scheduled    Osteoporosis  Hx pathologic fx as above. Started on alendronate, tolerating well.  -continues on alendronate  -Repeat DEXA in 2 years (~08/2025)    Hypertension  Chronic, BPs generally ranging 125-144 with intermittent values in 150s. Favor liberalized control in elderly pt.  -continues on amlodipine 10mg/d, coreg 25mg BID, hydralazine 25mg BID    Diabetes Mellitus Type II, on long-term insulin  BG trends have been elevated in afternoons with values 200s-300s. Fasting with range of .   -change metformin to 2000mg/d  -Increase Lantus to 5u qAM, 11u qPM  -continues on empagliflozin  -Continues with accuchecks QID per pt request    HFpEF  CAD s/p CABG  HTN / HLD  SSS s/p PPM  Chronic, stable. Without symptoms of angina. Weights stable at 135.  -Continues on coreg, statin, imdur, nitroglycerin, lasix    BPH  -Continue tamsulosin 0.4mg/d    Electronically signed by:  Hammad Dubois PA-C           Sincerely,        Hammad Dubois PA-C

## 2023-10-10 NOTE — PROGRESS NOTES
[facility] GERIATR[facility]  Chief Complaint   Patient presents with    [facility] Regulatory     [place] Medical Record Number:  [id number]  Place of Service where encounter took place:  [facility] () [[id number]]    HPI:    Barak Berry  is 89 year old with PMHx CAD s/p CABGx2 and recent NSTEMI, HFpEF, PAD, DMII, frequent falls, who is being seen today for a federally mandated E/M visit.    Patient is seen resting in bed, TLSO brace in place. Reports he is generally doing well, denies significant pain related to recent compression fracture in back. Brace is the most uncomfortable aspect, has a follow-up scheduled within the next 1-2 weeks to discuss use and mobility.     Main concern today is his blood glucose trends, feels he is frequently seeing values in the 300s and would like improved control. Appetite has been adequate, reports overall symptoms of significant constipation, stating it has been almost a week since having a bowel movement then yesterday had diarrhea. Would like to have regular medications for constipation ordered.    ALLERGIES:Cilostazol, Acetaminophen, Amlodipine, Hydrocodone, and Vicodin [hydrocodone-acetaminophen]  PAST MEDICAL HISTORY:   Past Medical History:   Diagnosis Date    CAD (coronary artery disease)     s/p CABG 1999, 2011    Chronic diastolic congestive heart failure (H)     CKD (chronic kidney disease) stage 3, GFR 30-59 ml/min (H)     Diabetes mellitus, type 2 (H)     Dyslipidemia     Gastroesophageal reflux disease     Hypertension     PAD (peripheral artery disease) (H)     Recurrent falls     Sick sinus syndrome (H)     s/p pacemaker    Thrombocytopenia (H)      PAST SURGICAL HISTORY:   has a past surgical history that includes CABG, VEIN, SINGLE; CORONARY STENT PERCUT, INITIAL VESSEL; appendectomy; Arthroscopy knee (Left); Total Knee Arthroplasty (Left); Lumbar Laminectomy; Tonsillectomy; joint replacement; Cardiac surgery; and Colonoscopy w/wo [place]  **Performed** (N/A, 1/3/2019).  FAMILY HISTORY: family history is not on file.  SOCIAL HISTORY:  reports that he quit smoking about 62 years ago. He has a 45.00 pack-year smoking history. He has never used smokeless tobacco. He reports that he does not currently use alcohol. He reports that he does not use drugs.    MEDICATIONS:  MED REC REQUIRED  Post Medication Reconciliation Status: patient was not discharged from an inpatient facility or TCU         Review of your medicines            Accurate as of October 10, 2023 11:59 PM. If you have any questions, ask your nurse or doctor.                CONTINUE these medicines which may have CHANGED, or have new prescriptions. If we are uncertain of the size of tablets/capsules you have at home, strength may be listed as something that might have changed.        Dose / Directions   acetaminophen 500 MG tablet  Commonly known as: TYLENOL  This may have changed: Another medication with the same name was removed. Continue taking this medication, and follow the directions you see here.  Changed by: Hammad Dubois PA-C      Dose: 500 mg  Take 500 mg by mouth every 4 hours as needed AND 1 TAB PO Q4H PRN  Refills: 0     isosorbide mononitrate 30 MG 24 hr tablet  Commonly known as: IMDUR  This may have changed: Another medication with the same name was removed. Continue taking this medication, and follow the directions you see here.  Changed by: Hammad Dubois PA-C      Dose: 120 mg  Take 120 mg by mouth daily  Refills: 0     metFORMIN 500 MG 24 hr tablet  Commonly known as: GLUCOPHAGE XR  Indication: Type 2 Diabetes  This may have changed: Another medication with the same name was removed. Continue taking this medication, and follow the directions you see here.  Changed by: Du Torrez RN      Dose: 2,000 mg  Take 2,000 mg by mouth every morning  Refills: 0            CONTINUE these medicines which have NOT CHANGED        Dose / Directions   alendronate 70 MG tablet  Commonly  known as: FOSAMAX      Dose: 70 mg  Take 70 mg by mouth every 7 days Sit upright 30 min after  Refills: 0     amLODIPine 5 MG tablet  Commonly known as: NORVASC      Dose: 10 mg  Take 10 mg by mouth daily  Refills: 0     aspirin 81 MG EC tablet  Commonly known as: ASA      Dose: 81 mg  [ASPIRIN 81 MG EC TABLET] Take 81 mg by mouth daily.  Refills: 0     atorvastatin 40 MG tablet  Commonly known as: LIPITOR      Dose: 40 mg  Take 40 mg by mouth every evening  Refills: 0     bisacodyl 10 MG suppository  Commonly known as: DULCOLAX  Indication: Constipation      Dose: 10 mg  Place 10 mg rectally daily as needed for constipation  Refills: 0     carvedilol 25 MG tablet  Commonly known as: COREG      Dose: 25 mg  Take 25 mg by mouth 2 times daily  Refills: 0     citalopram 10 MG tablet  Commonly known as: celeXA      Dose: 20 mg  Take 20 mg by mouth daily  Refills: 0     clopidogrel 75 MG tablet  Commonly known as: PLAVIX      Dose: 1 tablet  Take 1 tablet by mouth every morning  Refills: 0     empagliflozin 10 MG Tabs tablet  Commonly known as: JARDIANCE      Dose: 25 mg  Take 25 mg by mouth daily  Refills: 0     furosemide 20 MG tablet  Commonly known as: LASIX      Dose: 20 mg  Take 20 mg by mouth  Refills: 0     hydrALAZINE 25 MG tablet  Commonly known as: APRESOLINE      Dose: 25 mg  Take 25 mg by mouth 2 times daily  Refills: 0     HYDROmorphone 2 MG tablet  Commonly known as: DILAUDID  Used for: Closed compression fracture of L2 lumbar vertebra with routine healing, subsequent encounter      Dose: 2 mg  Take 1 tablet (2 mg) by mouth every 8 hours as needed for pain  Quantity: 90 tablet  Refills: 0     ICaps AREDS Formula Tabs      Dose: 1 capsule  [VITAMINS  A,C,E-ZINC-COPPER (ICAPS AREDS) 7,160-113-100 UNIT-MG-UNIT TBEC] Take 1 capsule by mouth daily.  Refills: 0     insulin glargine 100 UNIT/ML pen  Commonly known as: LANTUS PEN      Dose: 11 Units  Inject 11 Units Subcutaneous at bedtime And 5u qAM  Quantity: 15  "mL  Refills: 0     magnesium chloride 535 (64 Mg) MG Tbec CR tablet      Dose: 535 mg  Take 535 mg by mouth daily  Refills: 0     mirtazapine 7.5 MG tablet  Commonly known as: REMERON      Dose: 7.5 mg  Take 7.5 mg by mouth At Bedtime  Refills: 0     nitroGLYcerin 0.4 MG sublingual tablet  Commonly known as: NITROSTAT      Dose: 0.4 mg  [NITROGLYCERIN (NITROSTAT) 0.4 MG SL TABLET] Place 0.4 mg under the tongue as needed.  Refills: 0     ondansetron 8 MG tablet  Commonly known as: ZOFRAN      Dose: 8 mg  Take 8 mg by mouth every 8 hours as needed for nausea  Refills: 0     pantoprazole 20 MG EC tablet  Commonly known as: PROTONIX      Dose: 20 mg  Take 20 mg by mouth every other day  Refills: 0     polyethylene glycol 17 g packet  Commonly known as: MIRALAX      Dose: 17 g  Take 17 g by mouth daily as needed for constipation  Refills: 0     senna-docusate 8.6-50 MG tablet  Commonly known as: SENOKOT-S/PERICOLACE  Indication: Constipation      Dose: 2 tablet  Take 2 tablets by mouth 2 times daily as needed for constipation  Refills: 0     tamsulosin 0.4 MG capsule  Commonly known as: FLOMAX      Dose: 0.4 mg  Take 0.4 mg by mouth daily  Refills: 0            STOP taking      insulin aspart 100 UNITS/ML vial  Commonly known as: NovoLOG VIAL  Stopped by: Hammad Dubois PA-C        terazosin 10 MG capsule  Commonly known as: HYTRIN  Stopped by: Hammad Dubois PA-C                 Case Management:  I have reviewed the care plan and MDS and do agree with the plan. Patient's desire to return to the community is not present. Information reviewed:  Medications, vital signs, orders, and nursing notes.    ROS:  4 point ROS including Respiratory, CV, GI and , other than that noted in the HPI,  is negative    Vitals:  BP (!) 157/56   Pulse 70   Temp 98  F (36.7  C)   Resp 18   Ht 1.702 m (5' 7\")   Wt 62.4 kg (137 lb 9.6 oz)   SpO2 97%   BMI 21.55 kg/m    Body mass index is 21.55 kg/m .  Exam:  GEN: well-developed, " well-nourished, appears comfortable  HEENT: NCAT, EOM intact bilaterally, sclera clear, conjunctiva normal, nose & mouth patent, mucous membranes moist  CHEST: lungs CTA bilaterally, no increased work of breathing, no wheeze, crackles, rhonchi  HEART: RRR, S1 & S2, no murmur  ABD: soft, nontender, nondistended, no guarding or rigidity, +BS in all 4 quadrants  MSK: AROM bilateral UE/LE, pedal & radial pulses 2+ bilaterally  NEURO: awake, alert, oriented to name, place, and time. CN II-XII grossly intact. Sensation grossly intact to light touch.   SKIN: warm & dry without rash, no pedal edema    Lab/Diagnostic data:   Recent labs in [facility] reviewed by me today.     ASSESSMENT/PLAN    Compression deformity, L2  Continues with TLSO brace, pain controlled.  -Continues on tylenol PRN, dilaudid PRN  -Follow-up with neurosurgery as scheduled    Osteoporosis  Hx pathologic fx as above. Started on alendronate, tolerating well.  -continues on alendronate  -Repeat DEXA in 2 years (~08/2025)    Hypertension  Chronic, BPs generally ranging 125-144 with intermittent values in 150s. Favor liberalized control in elderly pt.  -continues on amlodipine 10mg/d, coreg 25mg BID, hydralazine 25mg BID    Diabetes Mellitus Type II, on long-term insulin  BG trends have been elevated in afternoons with values 200s-300s. Fasting with range of .   -change metformin to 2000mg/d  -Increase Lantus to 5u qAM, 11u qPM  -continues on empagliflozin  -Continues with accuchecks QID per pt request    HFpEF  CAD s/p CABG  HTN / HLD  SSS s/p PPM  Chronic, stable. Without symptoms of angina. Weights stable at 135.  -Continues on coreg, statin, imdur, nitroglycerin, lasix    BPH  -Continue tamsulosin 0.4mg/d    Electronically signed by:  Hammad Dubois PA-C

## 2023-10-10 NOTE — TELEPHONE ENCOUNTER
[facility] Triage Nurse Telephone Encounter    Provider: Hammad Dubois PA-C  Facility: [facility] Facility Type:  [facility]    Caller: Samuel  Call Back Number: [phone]    Allergies:    Allergies   Allergen Reactions    Cilostazol Unknown     Other reaction(s): Tachycardia, tachycardia, chest pain, palpitation  Chest pressure/heart pounding      Acetaminophen Nausea and Vomiting     Other reaction(s): Nausea/vomiting  Other reaction(s): Nausea/vomiting    Amlodipine Unknown     Other reaction(s): Edema    Hydrocodone      Other reaction(s): Nausea/vomiting    Vicodin [Hydrocodone-Acetaminophen] Unknown        Reason for call: Nurse is calling to clarify orders.      Verbal Order/Direction given by Provider: Discontinue scheduled Miralax.  Continue Miralax 17g daily PRN.  Continue Senna S---2 tabs daily PRN.  Continue Dulcolax suppository 10mg daily PRN.  Discontinue current Metformin orders.  Start Metformin ER 2000mg Q AM.      Provider giving Order:  Hammad Dubois PA-C    Verbal Order given to: Samuel Torrez RN

## 2023-11-29 NOTE — TELEPHONE ENCOUNTER
[facility] Triage Nurse Telephone Encounter    Provider: PAULINA Law CNP  Facility: [facility]  Facility Type:  [facility]      Allergies:    Allergies   Allergen Reactions    Cilostazol Unknown     Other reaction(s): Tachycardia, tachycardia, chest pain, palpitation  Chest pressure/heart pounding      Acetaminophen Nausea and Vomiting     Other reaction(s): Nausea/vomiting  Other reaction(s): Nausea/vomiting    Amlodipine Unknown     Other reaction(s): Edema    Hydrocodone      Other reaction(s): Nausea/vomiting    Vicodin [Hydrocodone-Acetaminophen] Unknown        Reason for call: Pt is having high glucose readings the past couple of weeks as reported by nursing.    Verbal Order/Direction given by Provider:   1) Discontinue insulin glargine  2) Start NPH insulin, 3 units in the mornings, 2 units in the evenings    Provider giving Order:  Rosenstein, Benjamin MD    Verbal Order given to: Mary Giron RN

## 2023-11-30 NOTE — PROGRESS NOTES
"[facility] GERIATRICS    Chief Complaint   Patient presents with    Nursing Home Acute     HPI:  Barak Berry is a 89 year old  (1/6/1932), who is being seen today for an episodic care visit at: [facility] () [[id number]]. Today's concern is:       Type 2 diabetes mellitus with hyperglycemia, with long-term current use of insulin (H)  Hyperglycemia  Fall, subsequent encounter  Age-related osteoporosis with current pathological fracture, vertebra(e), subsequent encounter for fracture with routine healing  Pain  Rhinorrhea  Loose stools  Normocytic anemia    Staff reports elevated blood sugars and increased pain.     Patient is seen today for an acute visit in LTC. Patient notes increased pain in his back and having to request for pain medication. He is receptive to scheduling Tylenol to see it will help with pain relief. He also endorses feeling \"a little\" SOB especially when laying down. He notes having diarrhea recently but does not want intervention. Appetite is good. Sleeping well. Denies CP, palpitations, lightheadedness, dizziness, fatigue, SOB, fever, chills, nausea/vomiting, bladder or bowel concerns today.      Allergies, and PMH/PSH reviewed in EPIC today.  REVIEW OF SYSTEMS:  10 point ROS of systems including Constitutional, Eyes, Respiratory, Cardiovascular, Gastroenterology, Genitourinary, Integumentary, Musculoskeletal, Psychiatric were all negative except for pertinent positives noted in my HPI.    Objective:   /68   Pulse 72   Temp 98.2  F (36.8  C)   Resp 18   Ht 1.702 m (5' 7\")   Wt 61.3 kg (135 lb 3.2 oz)   SpO2 98%   BMI 21.18 kg/m    GENERAL APPEARANCE:  Alert, elderly, in no distress, sitting in wheelchair eating lunch  HEENT:  excessive nasal drainage, crusted yellow drainage around nostrils, [place], EOM intact  RESP: non-labored breathing, lungs clear to auscultation, no respiratory distress, no cough  CV:  Rate regular, S1 S2 noted, no edema  ABDOMEN:  " "soft, large, non-tender, bowel sounds normal  M/S:  wheelchair bound, moves all extremities, strength and tone equal bilaterally, no calf pain, arthritic changes noted in hands  SKIN: no obvious rash, lesions, ulcerations or petechiae   NEURO: examination of sensation by touch normal, follows and tracks, slow speech  PSYCH: calm, cooperative    Labs reviewed as per Epic and/or Care Everywhere.      Assessment/Plan:     Type 2 diabetes mellitus with hyperglycemia, with long-term current use of insulin (H)  Hyperglycemia  Glargine insulin was discontinued yesterday and started on NPH.  mg/dL this morning.   -continue NPH 3 units qAM and 2 units qPM  -continue metformin ER 2000 mg daily  -continue Jardiance 25 mg daily  -continue monitoring blood sugars  -Discontinue Boost daily  -Start Glucerna daily  -Start diabetic diet    Fall, subsequent encounter  Age-related osteoporosis with current pathological fracture, vertebra(e), subsequent encounter for fracture with routine healing  Pain  Staff reports finds patient frequently requesting hydromorphone for pain. Patient endorses increased pain today; receptive to scheduling Tylenol.   -Schedule Tylenol 1000 mg TID  -continue hydromorphone 2 mg q8h PRN  -monitor effectiveness    Rhinorrhea  Loose stools  Patient has excessive nasal drainage today. Denies allergies. Reports feeling \"a little\" SOB. He notes having diarrhea recently but declines for intervention.  -test for Covid  -monitor for worsening signs/symptoms of concerns    Normocytic anemia  Hgb 9.6; MCV 98 (7/22/23).  -check CBC on next lab day      Electronically signed by: Dr. Mary Ann Thomson, DNP, APRN, AGNP-BC        "

## 2023-11-30 NOTE — LETTER
"    11/30/2023        RE: Barak Berry  [address] [place] [address]        [facility] GERIATRICS    Chief Complaint   Patient presents with     Nursing Home Acute     HPI:  Barak Berry is a 89 year old  (1/6/1932), who is being seen today for an episodic care visit at: [facility] () [[id number]]. Today's concern is:       Type 2 diabetes mellitus with hyperglycemia, with long-term current use of insulin (H)  Hyperglycemia  Fall, subsequent encounter  Age-related osteoporosis with current pathological fracture, vertebra(e), subsequent encounter for fracture with routine healing  Pain  Rhinorrhea  Loose stools  Normocytic anemia    Staff reports elevated blood sugars and increased pain.     Patient is seen today for an acute visit in LTC. Patient notes increased pain in his back and having to request for pain medication. He is receptive to scheduling Tylenol to see it will help with pain relief. He also endorses feeling \"a little\" SOB especially when laying down. He notes having diarrhea recently but does not want intervention. Appetite is good. Sleeping well. Denies CP, palpitations, lightheadedness, dizziness, fatigue, SOB, fever, chills, nausea/vomiting, bladder or bowel concerns today.      Allergies, and PMH/PSH reviewed in EPIC today.  REVIEW OF SYSTEMS:  10 point ROS of systems including Constitutional, Eyes, Respiratory, Cardiovascular, Gastroenterology, Genitourinary, Integumentary, Musculoskeletal, Psychiatric were all negative except for pertinent positives noted in my HPI.    Objective:   /68   Pulse 72   Temp 98.2  F (36.8  C)   Resp 18   Ht 1.702 m (5' 7\")   Wt 61.3 kg (135 lb 3.2 oz)   SpO2 98%   BMI 21.18 kg/m    GENERAL APPEARANCE:  Alert, elderly, in no distress, sitting in wheelchair eating lunch  HEENT:  excessive nasal drainage, crusted yellow drainage around nostrils, [place], EOM intact  RESP: non-labored breathing, lungs clear to " "auscultation, no respiratory distress, no cough  CV:  Rate regular, S1 S2 noted, no edema  ABDOMEN:  soft, large, non-tender, bowel sounds normal  M/S:  wheelchair bound, moves all extremities, strength and tone equal bilaterally, no calf pain, arthritic changes noted in hands  SKIN: no obvious rash, lesions, ulcerations or petechiae   NEURO: examination of sensation by touch normal, follows and tracks, slow speech  PSYCH: calm, cooperative    Labs reviewed as per Epic and/or Care Everywhere.      Assessment/Plan:     Type 2 diabetes mellitus with hyperglycemia, with long-term current use of insulin (H)  Hyperglycemia  Glargine insulin was discontinued yesterday and started on NPH.  mg/dL this morning.   -continue NPH 3 units qAM and 2 units qPM  -continue metformin ER 2000 mg daily  -continue Jardiance 25 mg daily  -continue monitoring blood sugars  -Discontinue Boost daily  -Start Glucerna daily  -Start diabetic diet    Fall, subsequent encounter  Age-related osteoporosis with current pathological fracture, vertebra(e), subsequent encounter for fracture with routine healing  Pain  Staff reports finds patient frequently requesting hydromorphone for pain. Patient endorses increased pain today; receptive to scheduling Tylenol.   -Schedule Tylenol 1000 mg TID  -continue hydromorphone 2 mg q8h PRN  -monitor effectiveness    Rhinorrhea  Loose stools  Patient has excessive nasal drainage today. Denies allergies. Reports feeling \"a little\" SOB. He notes having diarrhea recently but declines for intervention.  -test for Covid  -monitor for worsening signs/symptoms of concerns    Normocytic anemia  Hgb 9.6; MCV 98 (7/22/23).  -check CBC on next lab day      Electronically signed by: Dr. Mary Ann Thomson, DNP, APRN, AGNP-BC          Sincerely,        Mary Ann Thomson, APRN CNP      "

## 2023-12-04 NOTE — PROGRESS NOTES
[facility] GERIATRICS  Chief Complaint   Patient presents with    [employer] Regulatory     [facility] Medical Record Number:  [id number]  Place of Service where encounter took place:  [facility] () [[id number]]    HPI:    Barak Brery  is 89 year old (1932),  former , olimpia (1932), with pmhx including recent NSTEMI, prior CABG (2x), HFpEF, PAD, T2DM, falls who is being seen today for a federally mandated E/M visit.     Prior to the visit today, had been called regarding hyperglycemia.  Was having blood sugars upper 300s to 400s.  Reviewed daily pattern which showed low normal blood sugars in the morning, climbing throughout the day, and then dropping again overnight to that lower normal range.  Did have 1 episode of true hypoglycemia with which he did not have symptoms.  Given this pattern, did a direct transition from 5 units Lantus to NovoLog split to 3 units a.m., 2 units p.m.  Since then blood sugars have been expectedly moderately elevated, without episodes of lows.    Additionally, he did have follow-up with the spine [place] 10/18/2023.  Overall doing well from their standpoint.  Weaning him out of the brace and also transition to a [place] TLSO.  He wears this most of the time when he is up and walking but not every time.  He does not experience pain when he is not wearing it.  Finds a generally comfortable, certainly much more so than the prior one.    Otherwise he is doing well.  Has no other concerns.  No chest pain or pressure.  No shortness of breath.  He is incontinent of bowel and bladder.  He is ambulating well with a 2 wheel walker relatively independently.  He has had no falls since coming to the long-term care.    Remains DNR/DNI, POLST reviewed with selective treatments.    ALLERGIES:Cilostazol, Acetaminophen, Amlodipine, Hydrocodone, and Vicodin [hydrocodone-acetaminophen]  PAST MEDICAL HISTORY:   Past Medical History:   Diagnosis Date    CAD  (coronary artery disease)     s/p CABG 1999, 2011    Chronic diastolic congestive heart failure (H)     CKD (chronic kidney disease) stage 3, GFR 30-59 ml/min (H)     Diabetes mellitus, type 2 (H)     Dyslipidemia     Gastroesophageal reflux disease     Hypertension     PAD (peripheral artery disease) (H)     Recurrent falls     Sick sinus syndrome (H)     s/p pacemaker    Thrombocytopenia (H)      PAST SURGICAL HISTORY:   has a past surgical history that includes CABG, VEIN, SINGLE; CORONARY STENT PERCUT, INITIAL VESSEL; appendectomy; Arthroscopy knee (Left); Total Knee Arthroplasty (Left); Lumbar Laminectomy; Tonsillectomy; joint replacement; Cardiac surgery; and Colonoscopy w/wo Brush **Performed** (N/A, 1/3/2019).  FAMILY HISTORY: family history is not on file.  SOCIAL HISTORY:  reports that he quit smoking about 62 years ago. He has a 45.00 pack-year smoking history. He has never used smokeless tobacco. He reports that he does not currently use alcohol. He reports that he does not use drugs.    MEDICATIONS:  MED REC REQUIRED  Post Medication Reconciliation Status: patient was not discharged from an inpatient facility or TCU         Review of your medicines            Accurate as of December 4, 2023  8:04 AM. If you have any questions, ask your nurse or doctor.                CONTINUE these medicines which have NOT CHANGED        Dose / Directions   acetaminophen 500 MG tablet  Commonly known as: TYLENOL      Dose: 500 mg  Take 500 mg by mouth every 4 hours as needed AND 1 TAB PO Q4H PRN  Refills: 0     alendronate 70 MG tablet  Commonly known as: FOSAMAX      Dose: 70 mg  Take 70 mg by mouth every 7 days Sit upright 30 min after  Refills: 0     amLODIPine 5 MG tablet  Commonly known as: NORVASC      Dose: 10 mg  Take 10 mg by mouth daily  Refills: 0     aspirin 81 MG EC tablet  Commonly known as: ASA      Dose: 81 mg  [ASPIRIN 81 MG EC TABLET] Take 81 mg by mouth daily.  Refills: 0     atorvastatin 40 MG  tablet  Commonly known as: LIPITOR      Dose: 40 mg  Take 40 mg by mouth every evening  Refills: 0     bisacodyl 10 MG suppository  Commonly known as: DULCOLAX  Indication: Constipation      Dose: 10 mg  Place 10 mg rectally daily as needed for constipation  Refills: 0     carvedilol 25 MG tablet  Commonly known as: COREG      Dose: 25 mg  Take 25 mg by mouth 2 times daily  Refills: 0     citalopram 10 MG tablet  Commonly known as: celeXA      Dose: 20 mg  Take 20 mg by mouth daily  Refills: 0     clopidogrel 75 MG tablet  Commonly known as: PLAVIX      Dose: 1 tablet  Take 1 tablet by mouth every morning  Refills: 0     empagliflozin 10 MG Tabs tablet  Commonly known as: JARDIANCE      Dose: 25 mg  Take 25 mg by mouth daily  Refills: 0     furosemide 20 MG tablet  Commonly known as: LASIX      Dose: 20 mg  Take 20 mg by mouth  Refills: 0     hydrALAZINE 25 MG tablet  Commonly known as: APRESOLINE      Dose: 25 mg  Take 25 mg by mouth 2 times daily  Refills: 0     HYDROmorphone 2 MG tablet  Commonly known as: DILAUDID  Used for: Closed compression fracture of L2 lumbar vertebra with routine healing, subsequent encounter      Dose: 2 mg  Take 1 tablet (2 mg) by mouth every 8 hours as needed for pain  Quantity: 90 tablet  Refills: 0     ICaps AREDS Formula Tabs      Dose: 1 capsule  [VITAMINS  A,C,E-ZINC-COPPER (ICAPS AREDS) 7,160-113-100 UNIT-MG-UNIT TBEC] Take 1 capsule by mouth daily.  Refills: 0     isosorbide mononitrate 30 MG 24 hr tablet  Commonly known as: IMDUR      Dose: 120 mg  Take 120 mg by mouth daily  Refills: 0     magnesium chloride 535 (64 Mg) MG Tbec CR tablet      Dose: 535 mg  Take 535 mg by mouth daily  Refills: 0     metFORMIN 500 MG 24 hr tablet  Commonly known as: GLUCOPHAGE XR  Indication: Type 2 Diabetes      Dose: 2,000 mg  Take 2,000 mg by mouth every morning  Refills: 0     mirtazapine 7.5 MG tablet  Commonly known as: REMERON      Dose: 7.5 mg  Take 7.5 mg by mouth At Bedtime  Refills:  "0     nitroGLYcerin 0.4 MG sublingual tablet  Commonly known as: NITROSTAT      Dose: 0.4 mg  [NITROGLYCERIN (NITROSTAT) 0.4 MG SL TABLET] Place 0.4 mg under the tongue as needed.  Refills: 0     ondansetron 8 MG tablet  Commonly known as: ZOFRAN      Dose: 8 mg  Take 8 mg by mouth every 8 hours as needed for nausea  Refills: 0     pantoprazole 20 MG EC tablet  Commonly known as: PROTONIX      Dose: 20 mg  Take 20 mg by mouth every other day  Refills: 0     polyethylene glycol 17 g packet  Commonly known as: MIRALAX      Dose: 17 g  Take 17 g by mouth daily as needed for constipation  Refills: 0     senna-docusate 8.6-50 MG tablet  Commonly known as: SENOKOT-S/PERICOLACE  Indication: Constipation      Dose: 2 tablet  Take 2 tablets by mouth 2 times daily as needed for constipation  Refills: 0     tamsulosin 0.4 MG capsule  Commonly known as: FLOMAX      Dose: 0.4 mg  Take 0.4 mg by mouth daily  Refills: 0             Case Management:  I have reviewed the care plan and MDS and do agree with the plan. Patient's desire to return to the community is not present. Information reviewed:  Medications, vital signs, orders, and nursing notes.    Vitals:  /68   Pulse 72   Temp 98.2  F (36.8  C)   Resp 18   Ht 1.702 m (5' 7\")   Wt 61.3 kg (135 lb 3.2 oz)   SpO2 98%   BMI 21.18 kg/m    Body mass index is 21.18 kg/m .  Exam:    GENERAL APPEARANCE: Laying in bed comfortably after seen ambulating around the floor, NAD  HENT:  Mild temproral atrophy, mildly [place], good dentition  EYES:  Conjunctiva clear, anicteric, EOMI, PERRL  PULM  Normal WOB on RA, lungs CTAB, no wheezes or crackles, good air movement  CV:  RRR, S1/S2 normal, no murmurs; no LE edema  ABDOMEN: Abdomen soft, not tender, not distended, BS normal and active throughout   NEURO: Alert, answering questions appropriately, normal thought processes; CN II-XII grossly intact, Slow gait with walker  PSYCH: Mood normal with congruent affect, insight/judgment " intact    Lab/Diagnostic data:   Recent labs in [facility] reviewed by me today.     ASSESSMENT/PLAN    (E11.65,  Z79.4) Type 2 diabetes mellitus with hyperglycemia, with long-term current use of insulin (H)  (primary encounter diagnosis)  (R73.9) Hyperglycemia  Comment: Continues to have significant hyperglycemia after transition to NPH twice a day.  Blood sugars have been relatively difficult to control in the past with large fluctuations, which have continued on long-acting.  Given this, did elect to avoid short acting mealtime insulin and transition to a twice a day NPH, particular to cover afternoon highs.  Will increase NPH to 5 units a.m. and p.m., continue close monitoring.  Plan:   -NPH 5 units every morning/every afternoon  -Continue metformin, empagliflozin    (I10) Essential (primary) hypertension  Comment: Blood pressures modestly elevated.  No episodes of hypotension.  Previously resumed amlodipine with no significant lower extremity edema.  Given diabetes, and concern for episodes of hypotension with hydralazine, will transition to losartan.  Will use twice a day dosing with due to short half-life.  Plan:   -Decrease hydralazine to 12.5 mg twice daily  -Start losartan 25 mg twice daily  -BMP next lab day    (I25.2) History of non-ST elevation myocardial infarction (NSTEMI)  Comment: No chest symptoms.  Continues on Lasix, clopidogrel and aspirin, atorvastatin.    (M80.08XD) Age-related osteoporosis with current pathological fracture, vertebra(e), subsequent encounter for fracture with   (S32.020D) Closed compression fracture of L2 lumbar vertebra with routine healing, subsequent encounter  Comment: Recent follow-up with the spine clinic, transitioning to an [place] TLSO.  Overall pain is well-controlled.  Started previously on alendronate and tolerating well.    Orders  [X] Hydralazine to 12.5mg BID  [X] Start losartan 25mg BID  [X] BMP this week  [X] NPH 5 units BID, adjust as needed  Electronically signed  by:    Benjamin Rosenstein, MD, MA  [facility]     This note was completed with the assistance of dictation software. Typos and word substitution-errors are expected and unintended.      46 MINUTES SPENT BY ME on the date of service doing chart review, history, exam, documentation & further activities per the note.

## 2023-12-04 NOTE — LETTER
2023        RE: Barak Berry  [address] [place] [address]        M [facility] GERIATRICS  Chief Complaint   Patient presents with     detention Regulatory     [facility] Medical Record Number:  [id number]  Place of Service where encounter took place:  [facility] () [[id number]]    HPI:    Barak Berry  is 89 year old (1932),  former , olimpia (1932), with pmhx including recent NSTEMI, prior CABG (2x), HFpEF, PAD, T2DM, falls who is being seen today for a federally mandated E/M visit.     Prior to the visit today, had been called regarding hyperglycemia.  Was having blood sugars upper 300s to 400s.  Reviewed daily pattern which showed low normal blood sugars in the morning, climbing throughout the day, and then dropping again overnight to that lower normal range.  Did have 1 episode of true hypoglycemia with which he did not have symptoms.  Given this pattern, did a direct transition from 5 units Lantus to NovoLog split to 3 units a.m., 2 units p.m.  Since then blood sugars have been expectedly moderately elevated, without episodes of lows.    Additionally, he did have follow-up with the spine [facility] 10/18/2023.  Overall doing well from their standpoint.  Weaning him out of the brace and also transition to a [place] TLSO.  He wears this most of the time when he is up and walking but not every time.  He does not experience pain when he is not wearing it.  Finds a generally comfortable, certainly much more so than the prior one.    Otherwise he is doing well.  Has no other concerns.  No chest pain or pressure.  No shortness of breath.  He is incontinent of bowel and bladder.  He is ambulating well with a 2 wheel walker relatively independently.  He has had no falls since coming to the long-term care.    Remains DNR/DNI, POLST reviewed with selective treatments.    ALLERGIES:Cilostazol, Acetaminophen, Amlodipine, Hydrocodone, and Vicodin  [hydrocodone-acetaminophen]  PAST MEDICAL HISTORY:   Past Medical History:   Diagnosis Date     CAD (coronary artery disease)     s/p CABG 1999, 2011     Chronic diastolic congestive heart failure (H)      CKD (chronic kidney disease) stage 3, GFR 30-59 ml/min (H)      Diabetes mellitus, type 2 (H)      Dyslipidemia      Gastroesophageal reflux disease      Hypertension      PAD (peripheral artery disease) (H)      Recurrent falls      Sick sinus syndrome (H)     s/p pacemaker     Thrombocytopenia (H)      PAST SURGICAL HISTORY:   has a past surgical history that includes CABG, VEIN, SINGLE; CORONARY STENT PERCUT, INITIAL VESSEL; appendectomy; Arthroscopy knee (Left); Total Knee Arthroplasty (Left); Lumbar Laminectomy; Tonsillectomy; joint replacement; Cardiac surgery; and Colonoscopy w/wo Brush **Performed** (N/A, 1/3/2019).  FAMILY HISTORY: family history is not on file.  SOCIAL HISTORY:  reports that he quit smoking about 62 years ago. He has a 45.00 pack-year smoking history. He has never used smokeless tobacco. He reports that he does not currently use alcohol. He reports that he does not use drugs.    MEDICATIONS:  MED REC REQUIRED  Post Medication Reconciliation Status: patient was not discharged from an inpatient facility or TCU         Review of your medicines            Accurate as of December 4, 2023  8:04 AM. If you have any questions, ask your nurse or doctor.                CONTINUE these medicines which have NOT CHANGED        Dose / Directions   acetaminophen 500 MG tablet  Commonly known as: TYLENOL      Dose: 500 mg  Take 500 mg by mouth every 4 hours as needed AND 1 TAB PO Q4H PRN  Refills: 0     alendronate 70 MG tablet  Commonly known as: FOSAMAX      Dose: 70 mg  Take 70 mg by mouth every 7 days Sit upright 30 min after  Refills: 0     amLODIPine 5 MG tablet  Commonly known as: NORVASC      Dose: 10 mg  Take 10 mg by mouth daily  Refills: 0     aspirin 81 MG EC tablet  Commonly known as: ASA       Dose: 81 mg  [ASPIRIN 81 MG EC TABLET] Take 81 mg by mouth daily.  Refills: 0     atorvastatin 40 MG tablet  Commonly known as: LIPITOR      Dose: 40 mg  Take 40 mg by mouth every evening  Refills: 0     bisacodyl 10 MG suppository  Commonly known as: DULCOLAX  Indication: Constipation      Dose: 10 mg  Place 10 mg rectally daily as needed for constipation  Refills: 0     carvedilol 25 MG tablet  Commonly known as: COREG      Dose: 25 mg  Take 25 mg by mouth 2 times daily  Refills: 0     citalopram 10 MG tablet  Commonly known as: celeXA      Dose: 20 mg  Take 20 mg by mouth daily  Refills: 0     clopidogrel 75 MG tablet  Commonly known as: PLAVIX      Dose: 1 tablet  Take 1 tablet by mouth every morning  Refills: 0     empagliflozin 10 MG Tabs tablet  Commonly known as: JARDIANCE      Dose: 25 mg  Take 25 mg by mouth daily  Refills: 0     furosemide 20 MG tablet  Commonly known as: LASIX      Dose: 20 mg  Take 20 mg by mouth  Refills: 0     hydrALAZINE 25 MG tablet  Commonly known as: APRESOLINE      Dose: 25 mg  Take 25 mg by mouth 2 times daily  Refills: 0     HYDROmorphone 2 MG tablet  Commonly known as: DILAUDID  Used for: Closed compression fracture of L2 lumbar vertebra with routine healing, subsequent encounter      Dose: 2 mg  Take 1 tablet (2 mg) by mouth every 8 hours as needed for pain  Quantity: 90 tablet  Refills: 0     ICaps AREDS Formula Tabs      Dose: 1 capsule  [VITAMINS  A,C,E-ZINC-COPPER (ICAPS AREDS) 7,160-113-100 UNIT-MG-UNIT TBEC] Take 1 capsule by mouth daily.  Refills: 0     isosorbide mononitrate 30 MG 24 hr tablet  Commonly known as: IMDUR      Dose: 120 mg  Take 120 mg by mouth daily  Refills: 0     magnesium chloride 535 (64 Mg) MG Tbec CR tablet      Dose: 535 mg  Take 535 mg by mouth daily  Refills: 0     metFORMIN 500 MG 24 hr tablet  Commonly known as: GLUCOPHAGE XR  Indication: Type 2 Diabetes      Dose: 2,000 mg  Take 2,000 mg by mouth every morning  Refills: 0     mirtazapine  "7.5 MG tablet  Commonly known as: REMERON      Dose: 7.5 mg  Take 7.5 mg by mouth At Bedtime  Refills: 0     nitroGLYcerin 0.4 MG sublingual tablet  Commonly known as: NITROSTAT      Dose: 0.4 mg  [NITROGLYCERIN (NITROSTAT) 0.4 MG SL TABLET] Place 0.4 mg under the tongue as needed.  Refills: 0     ondansetron 8 MG tablet  Commonly known as: ZOFRAN      Dose: 8 mg  Take 8 mg by mouth every 8 hours as needed for nausea  Refills: 0     pantoprazole 20 MG EC tablet  Commonly known as: PROTONIX      Dose: 20 mg  Take 20 mg by mouth every other day  Refills: 0     polyethylene glycol 17 g packet  Commonly known as: MIRALAX      Dose: 17 g  Take 17 g by mouth daily as needed for constipation  Refills: 0     senna-docusate 8.6-50 MG tablet  Commonly known as: SENOKOT-S/PERICOLACE  Indication: Constipation      Dose: 2 tablet  Take 2 tablets by mouth 2 times daily as needed for constipation  Refills: 0     tamsulosin 0.4 MG capsule  Commonly known as: FLOMAX      Dose: 0.4 mg  Take 0.4 mg by mouth daily  Refills: 0             Case Management:  I have reviewed the care plan and MDS and do agree with the plan. Patient's desire to return to the community is not present. Information reviewed:  Medications, vital signs, orders, and nursing notes.    Vitals:  /68   Pulse 72   Temp 98.2  F (36.8  C)   Resp 18   Ht 1.702 m (5' 7\")   Wt 61.3 kg (135 lb 3.2 oz)   SpO2 98%   BMI 21.18 kg/m    Body mass index is 21.18 kg/m .  Exam:    GENERAL APPEARANCE: Laying in bed comfortably after seen ambulating around the floor, NAD  HENT:  Mild temproral atrophy, mildly [place], good dentition  EYES:  Conjunctiva clear, anicteric, EOMI, PERRL  PULM  Normal WOB on RA, lungs CTAB, no wheezes or crackles, good air movement  CV:  RRR, S1/S2 normal, no murmurs; no LE edema  ABDOMEN: Abdomen soft, not tender, not distended, BS normal and active throughout   NEURO: Alert, answering questions appropriately, normal thought processes; CN II-XII " grossly intact, Slow gait with walker  PSYCH: Mood normal with congruent affect, insight/judgment intact    Lab/Diagnostic data:   Recent labs in [facility] reviewed by me today.     ASSESSMENT/PLAN    (E11.65,  Z79.4) Type 2 diabetes mellitus with hyperglycemia, with long-term current use of insulin (H)  (primary encounter diagnosis)  (R73.9) Hyperglycemia  Comment: Continues to have significant hyperglycemia after transition to NPH twice a day.  Blood sugars have been relatively difficult to control in the past with large fluctuations, which have continued on long-acting.  Given this, did elect to avoid short acting mealtime insulin and transition to a twice a day NPH, particular to cover afternoon highs.  Will increase NPH to 5 units a.m. and p.m., continue close monitoring.  Plan:   -NPH 5 units every morning/every afternoon  -Continue metformin, empagliflozin    (I10) Essential (primary) hypertension  Comment: Blood pressures modestly elevated.  No episodes of hypotension.  Previously resumed amlodipine with no significant lower extremity edema.  Given diabetes, and concern for episodes of hypotension with hydralazine, will transition to losartan.  Will use twice a day dosing with due to short half-life.  Plan:   -Decrease hydralazine to 12.5 mg twice daily  -Start losartan 25 mg twice daily  -BMP next lab day    (I25.2) History of non-ST elevation myocardial infarction (NSTEMI)  Comment: No chest symptoms.  Continues on Lasix, clopidogrel and aspirin, atorvastatin.    (M80.08XD) Age-related osteoporosis with current pathological fracture, vertebra(e), subsequent encounter for fracture with   (S32.020D) Closed compression fracture of L2 lumbar vertebra with routine healing, subsequent encounter  Comment: Recent follow-up with the spine clinic, transitioning to an [place] TLSO.  Overall pain is well-controlled.  Started previously on alendronate and tolerating well.    Orders  [X] Hydralazine to 12.5mg BID  [X] Start  losartan 25mg BID  [X] BMP this week  [X] NPH 5 units BID, adjust as needed  Electronically signed by:    Benjamin Rosenstein, MD, MA  [facility] Faculty     This note was completed with the assistance of dictation software. Typos and word substitution-errors are expected and unintended.      46 MINUTES SPENT BY ME on the date of service doing chart review, history, exam, documentation & further activities per the note.            Sincerely,        Benjamin Rosenstein, MD

## 2023-12-11 NOTE — TELEPHONE ENCOUNTER
[facility] Geriatrics Orders Note     Provider: Rosenstein, Benjamin MD  Facility: [facility] Facility Type:  LTC    Allergies   Allergen Reactions    Cilostazol Unknown     Other reaction(s): Tachycardia, tachycardia, chest pain, palpitation  Chest pressure/heart pounding      Acetaminophen Nausea and Vomiting     Other reaction(s): Nausea/vomiting  Other reaction(s): Nausea/vomiting    Amlodipine Unknown     Other reaction(s): Edema    Hydrocodone      Other reaction(s): Nausea/vomiting    Vicodin [Hydrocodone-Acetaminophen] Unknown           Verbal Order/Direction given by Provider:   Give an extra 2 units of NPH this morning and then give 7 this evening.  Then tomorrow discontinue NPH and start lantus 5 units in the morning.  BMP tomorrow.     Provider giving Order:  Rosenstein, Benjamin MD    Verbal Order given to: Jennie Dueñas RN

## 2023-12-13 NOTE — TELEPHONE ENCOUNTER
[facility] Geriatrics Triage Nurse Telephone Encounter    Provider: Rosenstein, Benjamin MD  Facility: [facility] Facility Type:  LT    Caller: Samuel  Call Back Number: [phone]    Allergies:    Allergies   Allergen Reactions    Cilostazol Unknown     Other reaction(s): Tachycardia, tachycardia, chest pain, palpitation  Chest pressure/heart pounding      Acetaminophen Nausea and Vomiting     Other reaction(s): Nausea/vomiting  Other reaction(s): Nausea/vomiting    Amlodipine Unknown     Other reaction(s): Edema    Hydrocodone      Other reaction(s): Nausea/vomiting    Vicodin [Hydrocodone-Acetaminophen] Unknown        Reason for call: The nurse is calling about this patient's elevated BG's. Today before breakfast-353 and before lunch-464. Patient is on Lantus 5 units Q AM, Metformin XR 2000mg Q AM, Jardiance 25mg Q AM. Patient is not having symptoms of hyperglycemia. See BG results below:            Verbal Order/Direction given by Provider: Increase Lantus to 6 units Q AM.  Start insulin Aspart 2 units TID with meals.  MD to see patient tomorrow.      Provider giving Order:  Rosenstein, Benjamin MD    Verbal Order given to: Samuel Torrez RN

## 2023-12-14 NOTE — LETTER
2023        RE: Barak Berry  [address] [place] [address]        M [facility] GERIATRICS    Chief Complaint   Patient presents with     RECHECK     HPI:  Barak Berry is a 89 year old  (1932), former , olimpia (1932), with pmhx including recent NSTEMI, prior CABG (2x), HFpEF, PAD, T2DM, falls who is being seen today for an episodic care visit at: [facility] () [[id number]].    Follow-up today related to ongoing hyperglycemia.  To review, had seen him couple weeks ago as blood sugars had escalated significantly to 300s and 400s, couple episodes of blood sugar greater than 500.  Generally was having low normal blood sugars in the morning that would climb through the day and then significantly drop overnight.  Noted to have at least 1 episode of true hypoglycemia.  Given this, transition from Lantus to split dosing of NovoLog.  However even with increasing dose of NovoLog, blood sugars remained significantly elevated.  Since then, transition back to Lantus at prior dose of 5 units but in the mornings.  Had started mealtime insulin as well currently at 4 units with meals.    Today, his primary concern is ongoing elevated blood sugars.  He notes that before being in the facility, prior to TCU and long-term care, he was on 10 units of Lantus and 5 units short acting with meals at home.  He has been eating well and seems to have improved since he is acclimated to [facility].  He says he does not snack very much but he does enjoy sweet things.  Notes his son recently brought some lefse he is enjoyed.    He has no symptoms of hyperglycemia.  No lightheadedness or dizziness.  No nausea or vomiting.  Did have loose stool earlier today.  His appetites been all good.  No abdominal pain.    Blood pressures continue to be moderately elevated.  Of note, he had labs drawn 2023 which had shown hyperkalemia with a K of 5.8, slight increase in creatinine to 1.51  "from a prior about 1.2.  Notably hyperglycemic on those labs.  Had started losartan 25 mg twice a day just prior to this as well.  Had ordered to repeat these and these findings have since resolved.    Objective:   BP (!) 150/64   Pulse 79   Temp 98  F (36.7  C)   Resp 18   Ht 1.702 m (5' 7\")   Wt 60.5 kg (133 lb 6.4 oz)   SpO2 98%   BMI 20.89 kg/m      Limited exam as he is wearing TLSO    GENERAL APPEARANCE: Seated comfortably, NAD, at baseline  HENT:  Mild temporal atrophy, mildly [place], good dentition  PULM  Normal WOB on RA  CV:  RRR, 2/4 pulses  MSK: Wearing TLSO for comfort  NEURO: Alert, answering questions appropriately, normal thought processes; CN II-XII grossly intact  PSYCH:  Mood normal with congruent affect.    Recent labs in [facility] reviewed by me today.     Assessment/Plan:    (E11.65,  Z79.4) Type 2 diabetes mellitus with hyperglycemia, with long-term current use of insulin (H)  (primary encounter diagnosis)  (R54) Frailty  Comment: Recent modifications to his regimen as noted.  Suspect blood sugars had previously become more elevated due to significant decrease in remaining endocrine pancreatic function.  As of today, received 6 units Lantus and had increased short acting to 4 units with meals.  Will continue to watch blood sugars closely, increase Lantus to 8 units for tomorrow.  Increasing slowly as he has had significant hypoglycemia in the past with his frail status.  Plan:   -Increase Lantus to 8 units every morning  -Continue aspart 4 units with meals  -Continue empagliflozin daily  -Avoiding GLP1ra due to weight loss effects  -Continue metformin at this time -- may continue to have CVD benefit    (I10) Essential (primary) hypertension  Comment: Blood pressure is modestly elevated at this time.  Continue losartan 25 mg twice a day, hydralazine was previously decreased to 12.5 mg.  With ongoing adjustments to insulin possible potassium shifting, will not make changes at this " time.    (M80.08XD) Age-related osteoporosis with current pathological fracture, vertebra(e), subsequent encounter for fracture with routine healing  (S32.020D) Closed compression fracture of L2 lumbar vertebra with routine healing, subsequent encounter  Comment: Previously started on alendronate which she is tolerating well.  He no longer requires the TLSO brace though is occasionally wearing it for comfort.  However he does not experience significant pain generally when sitting up or standing    MED REC REQUIRED  Post Medication Reconciliation Status: patient was not discharged from an inpatient facility or TCU      Orders:  Increase lantus to 8 units qAM  Increase aspart to 4 units w/meals TID    Electronically signed by:     Benjamin Rosenstein, MD, MA  [facility]          Sincerely,        Benjamin Rosenstein, MD

## 2023-12-14 NOTE — TELEPHONE ENCOUNTER
[facility] Geriatrics Triage Nurse Telephone Encounter    Provider: Rosenstein, Benjamin MD  Facility: [place] [facility] Facility Type:  [facility]      Call Back Number: [phone]    Allergies:    Allergies   Allergen Reactions    Cilostazol Unknown     Other reaction(s): Tachycardia, tachycardia, chest pain, palpitation  Chest pressure/heart pounding      Acetaminophen Nausea and Vomiting     Other reaction(s): Nausea/vomiting  Other reaction(s): Nausea/vomiting    Amlodipine Unknown     Other reaction(s): Edema    Hydrocodone      Other reaction(s): Nausea/vomiting    Vicodin [Hydrocodone-Acetaminophen] Unknown        Reason for call: New orders per MD.    Verbal Order/Direction given by Provider: Increase the Aspart to 4 units with lunch and dinner.  Continue Aspart 2 units with breakfast.      Provider giving Order:  Rosenstein, Benjamin MD    Verbal Order given to: Samuel Torrez RN

## 2023-12-14 NOTE — PROGRESS NOTES
[facility] GERIATRICS    Chief Complaint   Patient presents with    RECHECK     HPI:  Barak Berry is a 89 year old  (1932), former , olimpia (1932), with pmhx including recent NSTEMI, prior CABG (2x), HFpEF, PAD, T2DM, falls who is being seen today for an episodic care visit at: [facility] () [[id number]].    Follow-up today related to ongoing hyperglycemia.  To review, had seen him couple weeks ago as blood sugars had escalated significantly to 300s and 400s, couple episodes of blood sugar greater than 500.  Generally was having low normal blood sugars in the morning that would climb through the day and then significantly drop overnight.  Noted to have at least 1 episode of true hypoglycemia.  Given this, transition from Lantus to split dosing of NovoLog.  However even with increasing dose of NovoLog, blood sugars remained significantly elevated.  Since then, transition back to Lantus at prior dose of 5 units but in the mornings.  Had started mealtime insulin as well currently at 4 units with meals.    Today, his primary concern is ongoing elevated blood sugars.  He notes that before being in the facility, prior to TCU and long-term care, he was on 10 units of Lantus and 5 units short acting with meals at home.  He has been eating well and seems to have improved since he is acclimated to [facility].  He says he does not snack very much but he does enjoy sweet things.  Notes his son recently brought some lefse he is enjoyed.    He has no symptoms of hyperglycemia.  No lightheadedness or dizziness.  No nausea or vomiting.  Did have loose stool earlier today.  His appetites been all good.  No abdominal pain.    Blood pressures continue to be moderately elevated.  Of note, he had labs drawn 2023 which had shown hyperkalemia with a K of 5.8, slight increase in creatinine to 1.51 from a prior about 1.2.  Notably hyperglycemic on those labs.  Had started losartan 25 mg  "twice a day just prior to this as well.  Had ordered to repeat these and these findings have since resolved.    Objective:   BP (!) 150/64   Pulse 79   Temp 98  F (36.7  C)   Resp 18   Ht 1.702 m (5' 7\")   Wt 60.5 kg (133 lb 6.4 oz)   SpO2 98%   BMI 20.89 kg/m      Limited exam as he is wearing TLSO    GENERAL APPEARANCE: Seated comfortably, NAD, at baseline  HENT:  Mild temporal atrophy, mildly [place], good dentition  PULM  Normal WOB on RA  CV:  RRR, 2/4 pulses  MSK: Wearing TLSO for comfort  NEURO: Alert, answering questions appropriately, normal thought processes; CN II-XII grossly intact  PSYCH:  Mood normal with congruent affect.    Recent labs in [facility] reviewed by me today.     Assessment/Plan:    (E11.65,  Z79.4) Type 2 diabetes mellitus with hyperglycemia, with long-term current use of insulin (H)  (primary encounter diagnosis)  (R54) Frailty  Comment: Recent modifications to his regimen as noted.  Suspect blood sugars had previously become more elevated due to significant decrease in remaining endocrine pancreatic function.  As of today, received 6 units Lantus and had increased short acting to 4 units with meals.  Will continue to watch blood sugars closely, increase Lantus to 8 units for tomorrow.  Increasing slowly as he has had significant hypoglycemia in the past with his frail status.  Plan:   -Increase Lantus to 8 units every morning  -Continue aspart 4 units with meals  -Continue empagliflozin daily  -Avoiding GLP1ra due to weight loss effects  -Continue metformin at this time -- may continue to have CVD benefit    (I10) Essential (primary) hypertension  Comment: Blood pressure is modestly elevated at this time.  Continue losartan 25 mg twice a day, hydralazine was previously decreased to 12.5 mg.  With ongoing adjustments to insulin possible potassium shifting, will not make changes at this time.    (M80.08XD) Age-related osteoporosis with current pathological fracture, vertebra(e), subsequent " encounter for fracture with routine healing  (S32.020D) Closed compression fracture of L2 lumbar vertebra with routine healing, subsequent encounter  Comment: Previously started on alendronate which she is tolerating well.  He no longer requires the TLSO brace though is occasionally wearing it for comfort.  However he does not experience significant pain generally when sitting up or standing    MED REC REQUIRED  Post Medication Reconciliation Status: patient was not discharged from an inpatient facility or TCU      Orders:  Increase lantus to 8 units qAM  Increase aspart to 4 units w/meals TID    Electronically signed by:     Benjamin Rosenstein, MD, MA  [facility] Faculty

## 2023-12-15 NOTE — TELEPHONE ENCOUNTER
[facility] Geriatrics Triage Nurse Telephone Encounter    Provider: Rosenstein, Benjamin MD  Facility: [facility] Facility Type:  LT      Call Back Number: [phone]    Allergies:    Allergies   Allergen Reactions    Cilostazol Unknown     Other reaction(s): Tachycardia, tachycardia, chest pain, palpitation  Chest pressure/heart pounding      Acetaminophen Nausea and Vomiting     Other reaction(s): Nausea/vomiting  Other reaction(s): Nausea/vomiting    Amlodipine Unknown     Other reaction(s): Edema    Hydrocodone      Other reaction(s): Nausea/vomiting    Vicodin [Hydrocodone-Acetaminophen] Unknown        Reason for call: New orders per MD.    Verbal Order/Direction given by Provider: Increase Lantus to 10 units Q AM.      Provider giving Order:  Rosenstein, Benjamin MD    Verbal Order given to: Mackenzie Torrez RN

## 2023-12-17 NOTE — TELEPHONE ENCOUNTER
593  blood sugars at suppertime.    Has an order for aspart 4 units but does not get that until 8pm.  Nurse told of the times for the 4 units aspart and not at a meal time.  That is how it is put in the computer.    Orders:  10 units of Aspart ordered now for supper  Call with HS blood sugar if over 450 PAULINA Hughes CNP

## 2023-12-19 NOTE — TELEPHONE ENCOUNTER
[facility] Nurse Telephone Encounter    Provider: Rosenstein, Benjamin MD  Facility: [facility] Facility Type:  LT      Call Back Number: [phone]    Allergies:    Allergies   Allergen Reactions    Cilostazol Unknown     Other reaction(s): Tachycardia, tachycardia, chest pain, palpitation  Chest pressure/heart pounding      Acetaminophen Nausea and Vomiting     Other reaction(s): Nausea/vomiting  Other reaction(s): Nausea/vomiting    Amlodipine Unknown     Other reaction(s): Edema    Hydrocodone      Other reaction(s): Nausea/vomiting    Vicodin [Hydrocodone-Acetaminophen] Unknown        Reason for call: New orders per MD    Verbal Order/Direction given by Provider: Give an extra 2 units of Lantus x 1 now.  Increase Aspart insulin to 6 units TID with meals.  Starting 12/20/23, increase Lantus to 14 units Q AM.  Add Aspart sliding scale TID with meals:  250-349= 2 units, 350-449= 3 units, 450 or greater= 4 units.  Clear liquid diet x 24 hours and advance diet as tolerated due to nausea.  Hold Aspart for BG less than 200.  Check patient for Covid-19 and Influenza A/B. Zofran 4mg Q 8 hours PRN.   Check Heme 1 and CMP 12/20/23.      Provider giving Order:  Rosenstein, Benjamin MD    Verbal Order given to: Samuel Torrez RN

## 2023-12-20 NOTE — TELEPHONE ENCOUNTER
[facility]iatrics Lab Note     Provider: Rosenstein, Benjamin MD  Facility: [facility] Facility Type:  [facility]    Allergies   Allergen Reactions    Cilostazol Unknown     Other reaction(s): Tachycardia, tachycardia, chest pain, palpitation  Chest pressure/heart pounding      Acetaminophen Nausea and Vomiting     Other reaction(s): Nausea/vomiting  Other reaction(s): Nausea/vomiting    Amlodipine Unknown     Other reaction(s): Edema    Hydrocodone      Other reaction(s): Nausea/vomiting    Vicodin [Hydrocodone-Acetaminophen] Unknown       Labs Reviewed by provider: Heme 1, CMP     Verbal Order/Direction given by Provider: Decrease Losartan to 12.5mg BID.  Push fluids.  Check CMP on 12/27/23.      Provider giving Order:  Rosenstein, Benjamin MD    Verbal Order given to: Monalisa Torrez RN

## 2023-12-28 NOTE — TELEPHONE ENCOUNTER
[facility] Triage Nurse Telephone Encounter    Provider: Rosenstein, Benjamin MD  Facility: [facility] Facility Type:  [facility]      Call Back Number: [phone]    Allergies:    Allergies   Allergen Reactions    Cilostazol Unknown     Other reaction(s): Tachycardia, tachycardia, chest pain, palpitation  Chest pressure/heart pounding      Acetaminophen Nausea and Vomiting     Other reaction(s): Nausea/vomiting  Other reaction(s): Nausea/vomiting    Amlodipine Unknown     Other reaction(s): Edema    Hydrocodone      Other reaction(s): Nausea/vomiting    Vicodin [Hydrocodone-Acetaminophen] Unknown        Reason for call: New orders per MD.    Verbal Order/Direction given by Provider: Increase Lantus insulin to 16 units Q AM.      Provider giving Order:  Rosenstein, Benjamin MD    Verbal Order given to: Samuel Torrez RN

## 2024-01-01 ENCOUNTER — TELEPHONE (OUTPATIENT)
Dept: GERIATRICS | Facility: CLINIC | Age: 89
End: 2024-01-01
Payer: MEDICARE

## 2024-01-01 ENCOUNTER — LAB REQUISITION (OUTPATIENT)
Dept: LAB | Facility: CLINIC | Age: 89
End: 2024-01-01
Payer: MEDICARE

## 2024-01-01 ENCOUNTER — NURSING HOME VISIT (OUTPATIENT)
Dept: GERIATRICS | Facility: CLINIC | Age: 89
End: 2024-01-01
Payer: MEDICARE

## 2024-01-01 ENCOUNTER — DOCUMENTATION ONLY (OUTPATIENT)
Dept: GERIATRICS | Facility: CLINIC | Age: 89
End: 2024-01-01
Payer: MEDICARE

## 2024-01-01 VITALS
RESPIRATION RATE: 18 BRPM | OXYGEN SATURATION: 97 % | WEIGHT: 140.2 LBS | HEIGHT: 67 IN | DIASTOLIC BLOOD PRESSURE: 61 MMHG | HEART RATE: 86 BPM | TEMPERATURE: 98.6 F | SYSTOLIC BLOOD PRESSURE: 139 MMHG | BODY MASS INDEX: 22 KG/M2

## 2024-01-01 VITALS
TEMPERATURE: 97.7 F | OXYGEN SATURATION: 93 % | HEART RATE: 65 BPM | HEIGHT: 67 IN | WEIGHT: 120.6 LBS | BODY MASS INDEX: 18.93 KG/M2 | SYSTOLIC BLOOD PRESSURE: 121 MMHG | DIASTOLIC BLOOD PRESSURE: 74 MMHG | RESPIRATION RATE: 18 BRPM

## 2024-01-01 DIAGNOSIS — I50.30 NYHA CLASS 3 HEART FAILURE WITH PRESERVED EJECTION FRACTION (H): ICD-10-CM

## 2024-01-01 DIAGNOSIS — D69.6 THROMBOCYTOPENIA, UNSPECIFIED (H): ICD-10-CM

## 2024-01-01 DIAGNOSIS — F03.A0 MILD DEMENTIA WITHOUT BEHAVIORAL DISTURBANCE, PSYCHOTIC DISTURBANCE, MOOD DISTURBANCE, OR ANXIETY, UNSPECIFIED DEMENTIA TYPE (H): Primary | ICD-10-CM

## 2024-01-01 DIAGNOSIS — M80.08XD AGE-RELATED OSTEOPOROSIS WITH CURRENT PATHOLOGICAL FRACTURE, VERTEBRA(E), SUBSEQUENT ENCOUNTER FOR FRACTURE WITH ROUTINE HEALING: ICD-10-CM

## 2024-01-01 DIAGNOSIS — E11.22 TYPE 2 DIABETES MELLITUS WITH DIABETIC CHRONIC KIDNEY DISEASE (H): ICD-10-CM

## 2024-01-01 DIAGNOSIS — I10 ESSENTIAL (PRIMARY) HYPERTENSION: ICD-10-CM

## 2024-01-01 DIAGNOSIS — E11.22 TYPE 2 DIABETES MELLITUS WITH STAGE 3A CHRONIC KIDNEY DISEASE, WITH LONG-TERM CURRENT USE OF INSULIN (H): ICD-10-CM

## 2024-01-01 DIAGNOSIS — Z79.4 TYPE 2 DIABETES MELLITUS WITH STAGE 3A CHRONIC KIDNEY DISEASE, WITH LONG-TERM CURRENT USE OF INSULIN (H): ICD-10-CM

## 2024-01-01 DIAGNOSIS — D64.9 ANEMIA, UNSPECIFIED: ICD-10-CM

## 2024-01-01 DIAGNOSIS — F03.A0 MILD DEMENTIA WITHOUT BEHAVIORAL DISTURBANCE, PSYCHOTIC DISTURBANCE, MOOD DISTURBANCE, OR ANXIETY, UNSPECIFIED DEMENTIA TYPE (H): ICD-10-CM

## 2024-01-01 DIAGNOSIS — N18.31 STAGE 3A CHRONIC KIDNEY DISEASE (H): ICD-10-CM

## 2024-01-01 DIAGNOSIS — E11.9 TYPE 2 DIABETES MELLITUS WITHOUT COMPLICATIONS (H): ICD-10-CM

## 2024-01-01 DIAGNOSIS — R15.9 INCONTINENCE OF FECES, UNSPECIFIED FECAL INCONTINENCE TYPE: ICD-10-CM

## 2024-01-01 DIAGNOSIS — N18.31 TYPE 2 DIABETES MELLITUS WITH STAGE 3A CHRONIC KIDNEY DISEASE, WITH LONG-TERM CURRENT USE OF INSULIN (H): ICD-10-CM

## 2024-01-01 DIAGNOSIS — I73.9 PAD (PERIPHERAL ARTERY DISEASE) (H): ICD-10-CM

## 2024-01-01 DIAGNOSIS — E11.22 TYPE 2 DIABETES MELLITUS WITH CHRONIC KIDNEY DISEASE (H): Primary | ICD-10-CM

## 2024-01-01 DIAGNOSIS — N18.9 CHRONIC KIDNEY DISEASE, UNSPECIFIED: ICD-10-CM

## 2024-01-01 DIAGNOSIS — R30.0 DYSURIA: ICD-10-CM

## 2024-01-01 DIAGNOSIS — E44.0 MODERATE PROTEIN-CALORIE MALNUTRITION (H): ICD-10-CM

## 2024-01-01 DIAGNOSIS — E11.22 TYPE 2 DIABETES MELLITUS WITH CHRONIC KIDNEY DISEASE (H): ICD-10-CM

## 2024-01-01 DIAGNOSIS — I25.700 CORONARY ARTERY DISEASE INVOLVING CORONARY BYPASS GRAFT OF NATIVE HEART WITH UNSTABLE ANGINA PECTORIS (H): ICD-10-CM

## 2024-01-01 DIAGNOSIS — F32.1 CURRENT MODERATE EPISODE OF MAJOR DEPRESSIVE DISORDER, UNSPECIFIED WHETHER RECURRENT (H): ICD-10-CM

## 2024-01-01 DIAGNOSIS — R63.4 WEIGHT LOSS: ICD-10-CM

## 2024-01-01 DIAGNOSIS — N18.32 TYPE 2 DIABETES MELLITUS WITH STAGE 3B CHRONIC KIDNEY DISEASE, WITH LONG-TERM CURRENT USE OF INSULIN (H): ICD-10-CM

## 2024-01-01 DIAGNOSIS — R29.6 FREQUENT FALLS: ICD-10-CM

## 2024-01-01 DIAGNOSIS — I35.0 NONRHEUMATIC AORTIC VALVE STENOSIS: ICD-10-CM

## 2024-01-01 DIAGNOSIS — N18.30 CHRONIC KIDNEY DISEASE, STAGE 3 UNSPECIFIED (H): ICD-10-CM

## 2024-01-01 DIAGNOSIS — I25.2 HISTORY OF NON-ST ELEVATION MYOCARDIAL INFARCTION (NSTEMI): ICD-10-CM

## 2024-01-01 DIAGNOSIS — I49.5 SINUS NODE DYSFUNCTION (H): ICD-10-CM

## 2024-01-01 DIAGNOSIS — I50.32 CHRONIC DIASTOLIC CHF (CONGESTIVE HEART FAILURE) (H): ICD-10-CM

## 2024-01-01 DIAGNOSIS — R54 FRAILTY: Primary | ICD-10-CM

## 2024-01-01 DIAGNOSIS — R06.09 CHRONIC DYSPNEA: ICD-10-CM

## 2024-01-01 DIAGNOSIS — Z79.4 TYPE 2 DIABETES MELLITUS WITH STAGE 3B CHRONIC KIDNEY DISEASE, WITH LONG-TERM CURRENT USE OF INSULIN (H): ICD-10-CM

## 2024-01-01 DIAGNOSIS — E11.22 TYPE 2 DIABETES MELLITUS WITH STAGE 3B CHRONIC KIDNEY DISEASE, WITH LONG-TERM CURRENT USE OF INSULIN (H): ICD-10-CM

## 2024-01-01 LAB
ALBUMIN SERPL BCG-MCNC: 3.6 G/DL (ref 3.5–5.2)
ALBUMIN UR-MCNC: NEGATIVE MG/DL
ALP SERPL-CCNC: 122 U/L (ref 40–150)
ALT SERPL W P-5'-P-CCNC: 54 U/L (ref 0–70)
ANION GAP SERPL CALCULATED.3IONS-SCNC: 13 MMOL/L (ref 7–15)
ANION GAP SERPL CALCULATED.3IONS-SCNC: 13 MMOL/L (ref 7–15)
ANION GAP SERPL CALCULATED.3IONS-SCNC: 14 MMOL/L (ref 7–15)
ANION GAP SERPL CALCULATED.3IONS-SCNC: 14 MMOL/L (ref 7–15)
ANION GAP SERPL CALCULATED.3IONS-SCNC: 18 MMOL/L (ref 7–15)
ANION GAP SERPL CALCULATED.3IONS-SCNC: 8 MMOL/L (ref 7–15)
APPEARANCE UR: CLEAR
AST SERPL W P-5'-P-CCNC: 72 U/L (ref 0–45)
BACTERIA UR CULT: NORMAL
BASOPHILS # BLD AUTO: 0 10E3/UL (ref 0–0.2)
BASOPHILS # BLD AUTO: 0 10E3/UL (ref 0–0.2)
BASOPHILS NFR BLD AUTO: 0 %
BASOPHILS NFR BLD AUTO: 1 %
BILIRUB SERPL-MCNC: 0.2 MG/DL
BILIRUB UR QL STRIP: NEGATIVE
BUN SERPL-MCNC: 153 MG/DL (ref 8–23)
BUN SERPL-MCNC: 50.6 MG/DL (ref 8–23)
BUN SERPL-MCNC: 63.8 MG/DL (ref 8–23)
BUN SERPL-MCNC: 68.5 MG/DL (ref 8–23)
BUN SERPL-MCNC: 74.5 MG/DL (ref 8–23)
BUN SERPL-MCNC: 78 MG/DL (ref 8–23)
CALCIUM SERPL-MCNC: 8.3 MG/DL (ref 8.2–9.6)
CALCIUM SERPL-MCNC: 9 MG/DL (ref 8.2–9.6)
CALCIUM SERPL-MCNC: 9 MG/DL (ref 8.2–9.6)
CALCIUM SERPL-MCNC: 9.1 MG/DL (ref 8.2–9.6)
CALCIUM SERPL-MCNC: 9.2 MG/DL (ref 8.2–9.6)
CALCIUM SERPL-MCNC: 9.4 MG/DL (ref 8.2–9.6)
CHLORIDE SERPL-SCNC: 104 MMOL/L (ref 98–107)
CHLORIDE SERPL-SCNC: 107 MMOL/L (ref 98–107)
CHLORIDE SERPL-SCNC: 108 MMOL/L (ref 98–107)
CHLORIDE SERPL-SCNC: 109 MMOL/L (ref 98–107)
CHLORIDE SERPL-SCNC: 111 MMOL/L (ref 98–107)
CHLORIDE SERPL-SCNC: 126 MMOL/L (ref 98–107)
COLOR UR AUTO: ABNORMAL
CREAT SERPL-MCNC: 1.25 MG/DL (ref 0.67–1.17)
CREAT SERPL-MCNC: 1.41 MG/DL (ref 0.67–1.17)
CREAT SERPL-MCNC: 1.42 MG/DL (ref 0.67–1.17)
CREAT SERPL-MCNC: 1.51 MG/DL (ref 0.67–1.17)
CREAT SERPL-MCNC: 1.63 MG/DL (ref 0.67–1.17)
CREAT SERPL-MCNC: 2.67 MG/DL (ref 0.67–1.17)
DEPRECATED HCO3 PLAS-SCNC: 17 MMOL/L (ref 22–29)
DEPRECATED HCO3 PLAS-SCNC: 19 MMOL/L (ref 22–29)
DEPRECATED HCO3 PLAS-SCNC: 19 MMOL/L (ref 22–29)
DEPRECATED HCO3 PLAS-SCNC: 21 MMOL/L (ref 22–29)
DEPRECATED HCO3 PLAS-SCNC: 23 MMOL/L (ref 22–29)
DEPRECATED HCO3 PLAS-SCNC: 25 MMOL/L (ref 22–29)
EGFRCR SERPLBLD CKD-EPI 2021: 22 ML/MIN/1.73M2
EGFRCR SERPLBLD CKD-EPI 2021: 39 ML/MIN/1.73M2
EGFRCR SERPLBLD CKD-EPI 2021: 43 ML/MIN/1.73M2
EGFRCR SERPLBLD CKD-EPI 2021: 46 ML/MIN/1.73M2
EGFRCR SERPLBLD CKD-EPI 2021: 47 ML/MIN/1.73M2
EGFRCR SERPLBLD CKD-EPI 2021: 54 ML/MIN/1.73M2
EOSINOPHIL # BLD AUTO: 0.1 10E3/UL (ref 0–0.7)
EOSINOPHIL # BLD AUTO: 0.1 10E3/UL (ref 0–0.7)
EOSINOPHIL NFR BLD AUTO: 1 %
EOSINOPHIL NFR BLD AUTO: 1 %
ERYTHROCYTE [DISTWIDTH] IN BLOOD BY AUTOMATED COUNT: 17.8 % (ref 10–15)
ERYTHROCYTE [DISTWIDTH] IN BLOOD BY AUTOMATED COUNT: 19.5 % (ref 10–15)
GLUCOSE SERPL-MCNC: 121 MG/DL (ref 70–99)
GLUCOSE SERPL-MCNC: 229 MG/DL (ref 70–99)
GLUCOSE SERPL-MCNC: 244 MG/DL (ref 70–99)
GLUCOSE SERPL-MCNC: 284 MG/DL (ref 70–99)
GLUCOSE SERPL-MCNC: 295 MG/DL (ref 70–99)
GLUCOSE SERPL-MCNC: 80 MG/DL (ref 70–99)
GLUCOSE UR STRIP-MCNC: 50 MG/DL
HBA1C MFR BLD: 8.4 %
HCT VFR BLD AUTO: 25 % (ref 40–53)
HCT VFR BLD AUTO: 33.4 % (ref 40–53)
HGB BLD-MCNC: 10.3 G/DL (ref 13.3–17.7)
HGB BLD-MCNC: 7.2 G/DL (ref 13.3–17.7)
HGB UR QL STRIP: NEGATIVE
HYALINE CASTS: 7 /LPF
IMM GRANULOCYTES # BLD: 0 10E3/UL
IMM GRANULOCYTES # BLD: 0.1 10E3/UL
IMM GRANULOCYTES NFR BLD: 0 %
IMM GRANULOCYTES NFR BLD: 1 %
KETONES UR STRIP-MCNC: NEGATIVE MG/DL
LEUKOCYTE ESTERASE UR QL STRIP: NEGATIVE
LYMPHOCYTES # BLD AUTO: 1.1 10E3/UL (ref 0.8–5.3)
LYMPHOCYTES # BLD AUTO: 1.6 10E3/UL (ref 0.8–5.3)
LYMPHOCYTES NFR BLD AUTO: 12 %
LYMPHOCYTES NFR BLD AUTO: 24 %
MCH RBC QN AUTO: 27.7 PG (ref 26.5–33)
MCH RBC QN AUTO: 27.8 PG (ref 26.5–33)
MCHC RBC AUTO-ENTMCNC: 28.8 G/DL (ref 31.5–36.5)
MCHC RBC AUTO-ENTMCNC: 30.8 G/DL (ref 31.5–36.5)
MCV RBC AUTO: 90 FL (ref 78–100)
MCV RBC AUTO: 96 FL (ref 78–100)
MONOCYTES # BLD AUTO: 0.7 10E3/UL (ref 0–1.3)
MONOCYTES # BLD AUTO: 0.7 10E3/UL (ref 0–1.3)
MONOCYTES NFR BLD AUTO: 10 %
MONOCYTES NFR BLD AUTO: 7 %
NEUTROPHILS # BLD AUTO: 4.2 10E3/UL (ref 1.6–8.3)
NEUTROPHILS # BLD AUTO: 7.7 10E3/UL (ref 1.6–8.3)
NEUTROPHILS NFR BLD AUTO: 64 %
NEUTROPHILS NFR BLD AUTO: 79 %
NITRATE UR QL: NEGATIVE
NRBC # BLD AUTO: 0 10E3/UL
NRBC # BLD AUTO: 0 10E3/UL
NRBC BLD AUTO-RTO: 0 /100
NRBC BLD AUTO-RTO: 0 /100
PH UR STRIP: 5.5 [PH] (ref 5–7)
PLATELET # BLD AUTO: 147 10E3/UL (ref 150–450)
PLATELET # BLD AUTO: 163 10E3/UL (ref 150–450)
POTASSIUM SERPL-SCNC: 4.7 MMOL/L (ref 3.4–5.3)
POTASSIUM SERPL-SCNC: 5.1 MMOL/L (ref 3.4–5.3)
POTASSIUM SERPL-SCNC: 5.2 MMOL/L (ref 3.4–5.3)
POTASSIUM SERPL-SCNC: 5.3 MMOL/L (ref 3.4–5.3)
POTASSIUM SERPL-SCNC: 5.4 MMOL/L (ref 3.4–5.3)
POTASSIUM SERPL-SCNC: 5.6 MMOL/L (ref 3.4–5.3)
PROT SERPL-MCNC: 5.8 G/DL (ref 6.4–8.3)
RBC # BLD AUTO: 2.6 10E6/UL (ref 4.4–5.9)
RBC # BLD AUTO: 3.7 10E6/UL (ref 4.4–5.9)
RBC URINE: 3 /HPF
SODIUM SERPL-SCNC: 137 MMOL/L (ref 135–145)
SODIUM SERPL-SCNC: 140 MMOL/L (ref 135–145)
SODIUM SERPL-SCNC: 143 MMOL/L (ref 135–145)
SODIUM SERPL-SCNC: 144 MMOL/L (ref 135–145)
SODIUM SERPL-SCNC: 146 MMOL/L (ref 135–145)
SODIUM SERPL-SCNC: 159 MMOL/L (ref 135–145)
SP GR UR STRIP: 1.01 (ref 1–1.03)
SQUAMOUS EPITHELIAL: 1 /HPF
TRANSITIONAL EPI: 1 /HPF
UROBILINOGEN UR STRIP-MCNC: NORMAL MG/DL
WBC # BLD AUTO: 6.6 10E3/UL (ref 4–11)
WBC # BLD AUTO: 9.6 10E3/UL (ref 4–11)
WBC URINE: 5 /HPF

## 2024-01-01 PROCEDURE — P9604 ONE-WAY ALLOW PRORATED TRIP: HCPCS | Mod: ORL | Performed by: FAMILY MEDICINE

## 2024-01-01 PROCEDURE — 99310 SBSQ NF CARE HIGH MDM 45: CPT

## 2024-01-01 PROCEDURE — 80048 BASIC METABOLIC PNL TOTAL CA: CPT | Mod: ORL

## 2024-01-01 PROCEDURE — P9604 ONE-WAY ALLOW PRORATED TRIP: HCPCS | Mod: ORL

## 2024-01-01 PROCEDURE — 36415 COLL VENOUS BLD VENIPUNCTURE: CPT | Mod: ORL | Performed by: FAMILY MEDICINE

## 2024-01-01 PROCEDURE — 85025 COMPLETE CBC W/AUTO DIFF WBC: CPT | Mod: ORL

## 2024-01-01 PROCEDURE — 80053 COMPREHEN METABOLIC PANEL: CPT | Mod: ORL | Performed by: FAMILY MEDICINE

## 2024-01-01 PROCEDURE — 36415 COLL VENOUS BLD VENIPUNCTURE: CPT | Mod: ORL

## 2024-01-01 PROCEDURE — 85025 COMPLETE CBC W/AUTO DIFF WBC: CPT | Mod: ORL | Performed by: FAMILY MEDICINE

## 2024-01-01 PROCEDURE — P9603 ONE-WAY ALLOW PRORATED MILES: HCPCS | Mod: ORL | Performed by: FAMILY MEDICINE

## 2024-01-01 PROCEDURE — 87086 URINE CULTURE/COLONY COUNT: CPT | Mod: ORL | Performed by: FAMILY MEDICINE

## 2024-01-01 PROCEDURE — 80048 BASIC METABOLIC PNL TOTAL CA: CPT | Mod: ORL | Performed by: FAMILY MEDICINE

## 2024-01-01 PROCEDURE — 99310 SBSQ NF CARE HIGH MDM 45: CPT | Performed by: FAMILY MEDICINE

## 2024-01-01 PROCEDURE — 83036 HEMOGLOBIN GLYCOSYLATED A1C: CPT | Mod: ORL

## 2024-01-01 PROCEDURE — 81001 URINALYSIS AUTO W/SCOPE: CPT | Mod: ORL | Performed by: FAMILY MEDICINE

## 2024-01-01 RX ORDER — SAXAGLIPTIN 2.5 MG/1
2.5 TABLET, FILM COATED ORAL DAILY
COMMUNITY
End: 2024-01-01

## 2024-01-01 RX ORDER — SAXAGLIPTIN 2.5 MG/1
2.5 TABLET, FILM COATED ORAL DAILY
Status: SHIPPED
Start: 2024-01-01 | End: 2024-01-01

## 2024-01-01 RX ORDER — ONDANSETRON 4 MG/1
TABLET, FILM COATED ORAL EVERY 6 HOURS PRN
COMMUNITY
Start: 2024-01-01

## 2024-01-16 NOTE — TELEPHONE ENCOUNTER
[facility] Triage Nurse Telephone Encounter    Provider: Rosenstein, Benjamin MD  Facility: [facility] Facility Type:  LT      Call Back Number: [phone]    Allergies:    Allergies   Allergen Reactions    Cilostazol Unknown     Other reaction(s): Tachycardia, tachycardia, chest pain, palpitation  Chest pressure/heart pounding      Acetaminophen Nausea and Vomiting     Other reaction(s): Nausea/vomiting  Other reaction(s): Nausea/vomiting    Amlodipine Unknown     Other reaction(s): Edema    Hydrocodone      Other reaction(s): Nausea/vomiting    Vicodin [Hydrocodone-Acetaminophen] Unknown        Reason for call: New orders per MD.    Verbal Order/Direction given by Provider: Increase Lantus to 18 units Q AM.      Provider giving Order:  Rosenstein, Benjamin MD    Verbal Order given to: Monalisa Torrez RN

## 2024-02-12 PROBLEM — F32.1 CURRENT MODERATE EPISODE OF MAJOR DEPRESSIVE DISORDER, UNSPECIFIED WHETHER RECURRENT (H): Status: ACTIVE | Noted: 2024-01-01

## 2024-02-12 PROBLEM — F33.42 MAJOR DEPRESSIVE DISORDER, RECURRENT, IN FULL REMISSION (H): Status: RESOLVED | Noted: 2023-01-31 | Resolved: 2024-01-01

## 2024-02-12 PROBLEM — F03.A0 MILD DEMENTIA WITHOUT BEHAVIORAL DISTURBANCE, PSYCHOTIC DISTURBANCE, MOOD DISTURBANCE, OR ANXIETY, UNSPECIFIED DEMENTIA TYPE (H): Status: ACTIVE | Noted: 2024-01-01

## 2024-02-12 NOTE — LETTER
"    2/12/2024        RE: Barak Berry  [address] [place] [address]        [facility] GERIATRICS  Chief Complaint   Patient presents with     long term Regulatory     [place] [facility] Record Number:  [id number]  Place of Service where encounter took place:  [facility] ([facility]) [[id number]]    HPI:    Barak Berry  is 89 year old (1/6/1932), who is being seen today for a federally mandated E/M visit. HPI information obtained from: facility chart records, facility staff, patient report and [facility] chart review. PMH: CAD s/p CABGx2, NSTEMI, CHF, PAD, DM2 w/hyperglycemia, and anemia. He has been a resident at this facility since July 2023.    2/10/24: Presented to the ED in the morning with c/o chest pain and SOB. Symptoms improved around noon. EKG negative for acute ischemia. Workup labs with 2 troponin levels within 1 of each other and mild hyperkalemia. Patient was discharged back to this facility.     Today:  Mr. Berry is seen in his room for a visit. He feels \"fine\". Reports going to the ED recently, \"they didn't do much\". He denies CP or palpitations right now. Mood is \"okay\"; reports, \"going to get worse tomorrow\"; \"I'm signing papers to sell my house\". Doesn't feel like he needs to be on 2 antidepressant medications; receptive to stopping mirtazapine at bedtime. Not much back pain. Pain in right leg. Starts below hip and radiates to thigh; no numbness or tingling. Takes pain medications with relief. He is seeing therapies \"couple times a week\". Appetite is the same; he normally eats a banana every morning but they didn't have it today. Sleeping well. Denies lightheadedness, dizziness, fatigue, SOB, fever, chills, nausea/vomiting, bladder or bowel concerns today.    ALLERGIES:Cilostazol, Acetaminophen, Amlodipine, Hydrocodone, and Vicodin [hydrocodone-acetaminophen]  PAST MEDICAL HISTORY:   Past Medical History:   Diagnosis Date     CAD (coronary artery disease)     " s/p CABG 1999, 2011     Chronic diastolic congestive heart failure (H)      CKD (chronic kidney disease) stage 3, GFR 30-59 ml/min (H)      Diabetes mellitus, type 2 (H)      Dyslipidemia      Gastroesophageal reflux disease      Hypertension      PAD (peripheral artery disease) (H)      Recurrent falls      Sick sinus syndrome (H)     s/p pacemaker     Thrombocytopenia (H)      PAST SURGICAL HISTORY:   has a past surgical history that includes CABG, VEIN, SINGLE; CORONARY STENT PERCUT, INITIAL VESSEL; appendectomy; Arthroscopy knee (Left); Total Knee Arthroplasty (Left); Lumbar Laminectomy; Tonsillectomy; joint replacement; Cardiac surgery; and Colonoscopy w/wo Brush **Performed** (N/A, 1/3/2019).  FAMILY HISTORY: family history is not on file.  SOCIAL HISTORY:  reports that he quit smoking about 63 years ago. He has a 45 pack-year smoking history. He has never used smokeless tobacco. He reports that he does not currently use alcohol. He reports that he does not use drugs.    MEDICATIONS:  MED REC REQUIRED  Post Medication Reconciliation Status:            Review of your medicines            Accurate as of February 12, 2024 11:59 PM. If you have any questions, ask your nurse or doctor.                CONTINUE these medicines which have NOT CHANGED        Dose / Directions   acetaminophen 500 MG tablet  Commonly known as: TYLENOL      Dose: 500 mg  Take 500 mg by mouth every 4 hours as needed AND 1 TAB PO Q4H PRN  Refills: 0     alendronate 70 MG tablet  Commonly known as: FOSAMAX      Dose: 70 mg  Take 70 mg by mouth every 7 days Sit upright 30 min after  Refills: 0     amLODIPine 5 MG tablet  Commonly known as: NORVASC      Dose: 10 mg  Take 10 mg by mouth daily  Refills: 0     aspirin 81 MG EC tablet  Commonly known as: ASA      Dose: 81 mg  [ASPIRIN 81 MG EC TABLET] Take 81 mg by mouth daily.  Refills: 0     atorvastatin 40 MG tablet  Commonly known as: LIPITOR      Dose: 40 mg  Take 40 mg by mouth every  evening  Refills: 0     bisacodyl 10 MG suppository  Commonly known as: DULCOLAX  Indication: Constipation      Dose: 10 mg  Place 10 mg rectally daily as needed for constipation  Refills: 0     carvedilol 25 MG tablet  Commonly known as: COREG      Dose: 25 mg  Take 25 mg by mouth 2 times daily  Refills: 0     citalopram 10 MG tablet  Commonly known as: celeXA      Dose: 20 mg  Take 20 mg by mouth daily  Refills: 0     clopidogrel 75 MG tablet  Commonly known as: PLAVIX      Dose: 1 tablet  Take 1 tablet by mouth every morning  Refills: 0     empagliflozin 10 MG Tabs tablet  Commonly known as: JARDIANCE      Dose: 25 mg  Take 25 mg by mouth daily  Refills: 0     furosemide 20 MG tablet  Commonly known as: LASIX      Dose: 20 mg  Take 20 mg by mouth  Refills: 0     hydrALAZINE 25 MG tablet  Commonly known as: APRESOLINE      Dose: 12.5 mg  Take 12.5 mg by mouth 2 times daily  Refills: 0     HYDROmorphone 2 MG tablet  Commonly known as: DILAUDID  Used for: Closed compression fracture of L2 lumbar vertebra with routine healing, subsequent encounter      Dose: 2 mg  Take 1 tablet (2 mg) by mouth every 8 hours as needed for pain  Quantity: 90 tablet  Refills: 0     ICaps AREDS Formula Tabs      Dose: 1 capsule  [VITAMINS  A,C,E-ZINC-COPPER (ICAPS AREDS) 7,160-113-100 UNIT-MG-UNIT TBEC] Take 1 capsule by mouth daily.  Refills: 0     * insulin aspart 100 UNIT/ML pen  Commonly known as: NovoLOG PEN  Indication: Type 2 Diabetes      Dose: 6 Units  Inject 6 Units Subcutaneous 3 times daily (with meals) (Hold for BG less than 200)  Refills: 0     * insulin aspart 100 UNIT/ML pen  Commonly known as: NovoLOG PEN  Indication: Type 2 Diabetes      Inject Subcutaneous 3 times daily (with meals) Per sliding scale:      250-349= 2 units  350-449= 3 units  450 or greater= 4 units  Refills: 0     insulin glargine 100 UNIT/ML pen  Commonly known as: LANTUS PEN  Indication: Type 2 Diabetes  Used for: Type 2 diabetes mellitus with  chronic kidney disease (H)      Dose: 18 Units  Inject 18 Units Subcutaneous every morning  Quantity: 15 mL  Refills: 3     isosorbide mononitrate 30 MG 24 hr tablet  Commonly known as: IMDUR      Dose: 120 mg  Take 120 mg by mouth daily  Refills: 0     losartan 25 MG tablet  Commonly known as: COZAAR  Indication: High Blood Pressure Disorder      Dose: 12.5 mg  Take 12.5 mg by mouth 2 times daily  Refills: 0     magnesium chloride 535 (64 Mg) MG Tbec CR tablet      Dose: 535 mg  Take 535 mg by mouth daily  Refills: 0     metFORMIN 500 MG 24 hr tablet  Commonly known as: GLUCOPHAGE XR  Indication: Type 2 Diabetes      Dose: 2,000 mg  Take 2,000 mg by mouth every morning  Refills: 0     nitroGLYcerin 0.4 MG sublingual tablet  Commonly known as: NITROSTAT      Dose: 0.4 mg  [NITROGLYCERIN (NITROSTAT) 0.4 MG SL TABLET] Place 0.4 mg under the tongue as needed.  Refills: 0     ondansetron 4 MG tablet  Commonly known as: ZOFRAN  Indication: Nausea and Vomiting      Dose: 4 mg  Take 4 mg by mouth every 8 hours as needed for nausea  Refills: 0     pantoprazole 20 MG EC tablet  Commonly known as: PROTONIX      Dose: 20 mg  Take 20 mg by mouth every other day  Refills: 0     polyethylene glycol 17 g packet  Commonly known as: MIRALAX      Dose: 17 g  Take 17 g by mouth daily as needed for constipation  Refills: 0     senna-docusate 8.6-50 MG tablet  Commonly known as: SENOKOT-S/PERICOLACE  Indication: Constipation      Dose: 2 tablet  Take 2 tablets by mouth 2 times daily as needed for constipation  Refills: 0     tamsulosin 0.4 MG capsule  Commonly known as: FLOMAX      Dose: 0.4 mg  Take 0.4 mg by mouth daily  Refills: 0           * This list has 2 medication(s) that are the same as other medications prescribed for you. Read the directions carefully, and ask your doctor or other care provider to review them with you.                STOP taking      mirtazapine 7.5 MG tablet  Commonly known as: REMERON  Stopped by: Mary Ann HARLEY  "PAULINA Thomson CNP                 Case Management:  I have reviewed the care plan and MDS and do agree with the plan. Patient's desire to return to the community is currently living in a community environment. Information reviewed:  Medications, vital signs, orders, and nursing notes.    ROS:  4 point ROS including Respiratory, CV, GI and , other than that noted in the HPI,  is negative    Vitals:  /61   Pulse 86   Temp 98.6  F (37  C)   Resp 18   Ht 1.702 m (5' 7\")   Wt 63.6 kg (140 lb 3.2 oz)   SpO2 97%   BMI 21.96 kg/m    Body mass index is 21.96 kg/m .  Exam:  GENERAL APPEARANCE:  Alert, elderly, in no distress, laying in bed watching television  HEENT:  atraumatic, [place], EOM intact, moist mucus membranes  RESP:  non-labored breathing, lungs clear on auscultation, no respiratory distress, no cough  CV:  Rate regular, S1 S2 noted, no edema  ABDOMEN:  soft, non-distended, non-tender, bowel sounds active  M/S:  moves all extremities, strength and tone equal bilaterally, no calf pain, arthritic changes noted in hands  SKIN:  warm, dry, thin, fragile, no obvious rash, lesions, ulcerations or petechiae   NEURO:   Face is symmetric, examination of sensation by touch normal, follows and tracks, slow speech  PSYCH:  calm, cooperative      Lab/Diagnostic data:   Labs reviewed as per Epic and/or Care Everywhere.      ASSESSMENT/PLAN     Mild dementia without behavioral disturbance, psychotic disturbance, mood disturbance, or anxiety, unspecified dementia type (H)  Progressive. Lives in LTC for supportive cares.   -continue 24/7 supportive nursing cares    Current moderate episode of major depressive disorder, unspecified whether recurrent (H)  Denies mood changes today.  -discontinue mirtazepine 7.5 mg at bedtime  -continue celexa 20 mg daily  -monitor mood and behaviors    Age-related osteoporosis with current pathological fracture, vertebra(e), subsequent encounter for fracture with routine healing  Closed " compression fracture of L2 lumbar vertebra in July 2023. Transitioned to [place] TLSO in Dec 2023. Patient reports he does not like to use the brace. Denies pain today.   -continue alendronate 70 mg every Monday  -follow-up with spine clinic as recommended    Essential (primary) hypertension  Chronic. Started losartan on 12/4/23 and hydralazine decreased for concerns of hypotension. Today, /61 mmHg. HR 86 bpm.   -continue hydralazine 12.5 mg twice daily  -continue lorsartan 25 mg twice daily  -continue amlodipine 10 mg daily  -follow BPs and adjust medications as needed    Chronic diastolic CHF (congestive heart failure) (H)  No peripheral edema today.  -continue furosemide 20 mg daily  -monitor volume status  -follow weights    Type 2 diabetes mellitus with stage 3a chronic kidney disease, with long-term current use of insulin (H)  PAD (peripheral artery disease) (H24)  A1c 9.9 (6/12/23). Today, FBS 97 mg/dL.   -continue Jardiance 25 mg daily  -continue lantus 18 units daily  -continue aspart 6 units before meals  -continue aspart sliding scale insulin before meals  -continue metformin ER 2000 mg daily  -continue blood sugar checks QID  -check A1c on next lab day    Stage 3a chronic kidney disease (H)  Progressive. GFR 44, Cr 1.47, K level 5.3 (2/10/24).  -check BMP on next lab day    Sinus node dysfunction (H)  Per chart history. Denies concerns today.  -monitor clinically    Thrombocytopenia, unspecified (H24)  Per chart history. Platelet count 151 (2/10/23).  -recheck CBC on next lab day    Moderate protein-calorie malnutrition (H24)  Per chart history. Weight 140# (1/29/24). 135-139# last month.  -continue Boost supplement daily      45 minutes spent on the date of this encounter doing chart review, review labs, discussion with nursing staff, patient visit, and documentation.       Electronically signed by:  Dr. Mary Ann Thomson, DNP, APRN, AGNP-BC          Sincerely,        Mary Ann Thomson, APRN CNP

## 2024-02-12 NOTE — PROGRESS NOTES
"[facility] GERIATRICS  Chief Complaint   Patient presents with    halfway Regulatory     [place] Medical Record Number:  [id number]  Place of Service where encounter took place:  [facility] () [[id number]]    HPI:    Barak Berry  is 89 year old (1/6/1932), who is being seen today for a federally mandated E/M visit. HPI information obtained from: facility chart records, facility staff, patient report and [facility] chart review. PMH: CAD s/p CABGx2, NSTEMI, CHF, PAD, DM2 w/hyperglycemia, and anemia. He has been a resident at this facility since July 2023.    2/10/24: Presented to the ED in the morning with c/o chest pain and SOB. Symptoms improved around noon. EKG negative for acute ischemia. Workup labs with 2 troponin levels within 1 of each other and mild hyperkalemia. Patient was discharged back to this facility.     Today:  Mr. Berry is seen in his room for a visit. He feels \"fine\". Reports going to the ED recently, \"they didn't do much\". He denies CP or palpitations right now. Mood is \"okay\"; reports, \"going to get worse tomorrow\"; \"I'm signing papers to sell my house\". Doesn't feel like he needs to be on 2 antidepressant medications; receptive to stopping mirtazapine at bedtime. Not much back pain. Pain in right leg. Starts below hip and radiates to thigh; no numbness or tingling. Takes pain medications with relief. He is seeing therapies \"couple times a week\". Appetite is the same; he normally eats a banana every morning but they didn't have it today. Sleeping well. Denies lightheadedness, dizziness, fatigue, SOB, fever, chills, nausea/vomiting, bladder or bowel concerns today.    ALLERGIES:Cilostazol, Acetaminophen, Amlodipine, Hydrocodone, and Vicodin [hydrocodone-acetaminophen]  PAST MEDICAL HISTORY:   Past Medical History:   Diagnosis Date    CAD (coronary artery disease)     s/p CABG 1999, 2011    Chronic diastolic congestive heart failure (H)     CKD (chronic " kidney disease) stage 3, GFR 30-59 ml/min (H)     Diabetes mellitus, type 2 (H)     Dyslipidemia     Gastroesophageal reflux disease     Hypertension     PAD (peripheral artery disease) (H)     Recurrent falls     Sick sinus syndrome (H)     s/p pacemaker    Thrombocytopenia (H)      PAST SURGICAL HISTORY:   has a past surgical history that includes CABG, VEIN, SINGLE; CORONARY STENT PERCUT, INITIAL VESSEL; appendectomy; Arthroscopy knee (Left); Total Knee Arthroplasty (Left); Lumbar Laminectomy; Tonsillectomy; joint replacement; Cardiac surgery; and Colonoscopy w/wo Brush **Performed** (N/A, 1/3/2019).  FAMILY HISTORY: family history is not on file.  SOCIAL HISTORY:  reports that he quit smoking about 63 years ago. He has a 45 pack-year smoking history. He has never used smokeless tobacco. He reports that he does not currently use alcohol. He reports that he does not use drugs.    MEDICATIONS:  MED REC REQUIRED  Post Medication Reconciliation Status:            Review of your medicines            Accurate as of February 12, 2024 11:59 PM. If you have any questions, ask your nurse or doctor.                CONTINUE these medicines which have NOT CHANGED        Dose / Directions   acetaminophen 500 MG tablet  Commonly known as: TYLENOL      Dose: 500 mg  Take 500 mg by mouth every 4 hours as needed AND 1 TAB PO Q4H PRN  Refills: 0     alendronate 70 MG tablet  Commonly known as: FOSAMAX      Dose: 70 mg  Take 70 mg by mouth every 7 days Sit upright 30 min after  Refills: 0     amLODIPine 5 MG tablet  Commonly known as: NORVASC      Dose: 10 mg  Take 10 mg by mouth daily  Refills: 0     aspirin 81 MG EC tablet  Commonly known as: ASA      Dose: 81 mg  [ASPIRIN 81 MG EC TABLET] Take 81 mg by mouth daily.  Refills: 0     atorvastatin 40 MG tablet  Commonly known as: LIPITOR      Dose: 40 mg  Take 40 mg by mouth every evening  Refills: 0     bisacodyl 10 MG suppository  Commonly known as: DULCOLAX  Indication:  Constipation      Dose: 10 mg  Place 10 mg rectally daily as needed for constipation  Refills: 0     carvedilol 25 MG tablet  Commonly known as: COREG      Dose: 25 mg  Take 25 mg by mouth 2 times daily  Refills: 0     citalopram 10 MG tablet  Commonly known as: celeXA      Dose: 20 mg  Take 20 mg by mouth daily  Refills: 0     clopidogrel 75 MG tablet  Commonly known as: PLAVIX      Dose: 1 tablet  Take 1 tablet by mouth every morning  Refills: 0     empagliflozin 10 MG Tabs tablet  Commonly known as: JARDIANCE      Dose: 25 mg  Take 25 mg by mouth daily  Refills: 0     furosemide 20 MG tablet  Commonly known as: LASIX      Dose: 20 mg  Take 20 mg by mouth  Refills: 0     hydrALAZINE 25 MG tablet  Commonly known as: APRESOLINE      Dose: 12.5 mg  Take 12.5 mg by mouth 2 times daily  Refills: 0     HYDROmorphone 2 MG tablet  Commonly known as: DILAUDID  Used for: Closed compression fracture of L2 lumbar vertebra with routine healing, subsequent encounter      Dose: 2 mg  Take 1 tablet (2 mg) by mouth every 8 hours as needed for pain  Quantity: 90 tablet  Refills: 0     ICaps AREDS Formula Tabs      Dose: 1 capsule  [VITAMINS  A,C,E-ZINC-COPPER (ICAPS AREDS) 7,160-113-100 UNIT-MG-UNIT TBEC] Take 1 capsule by mouth daily.  Refills: 0     * insulin aspart 100 UNIT/ML pen  Commonly known as: NovoLOG PEN  Indication: Type 2 Diabetes      Dose: 6 Units  Inject 6 Units Subcutaneous 3 times daily (with meals) (Hold for BG less than 200)  Refills: 0     * insulin aspart 100 UNIT/ML pen  Commonly known as: NovoLOG PEN  Indication: Type 2 Diabetes      Inject Subcutaneous 3 times daily (with meals) Per sliding scale:      250-349= 2 units  350-449= 3 units  450 or greater= 4 units  Refills: 0     insulin glargine 100 UNIT/ML pen  Commonly known as: LANTUS PEN  Indication: Type 2 Diabetes  Used for: Type 2 diabetes mellitus with chronic kidney disease (H)      Dose: 18 Units  Inject 18 Units Subcutaneous every  morning  Quantity: 15 mL  Refills: 3     isosorbide mononitrate 30 MG 24 hr tablet  Commonly known as: IMDUR      Dose: 120 mg  Take 120 mg by mouth daily  Refills: 0     losartan 25 MG tablet  Commonly known as: COZAAR  Indication: High Blood Pressure Disorder      Dose: 12.5 mg  Take 12.5 mg by mouth 2 times daily  Refills: 0     magnesium chloride 535 (64 Mg) MG Tbec CR tablet      Dose: 535 mg  Take 535 mg by mouth daily  Refills: 0     metFORMIN 500 MG 24 hr tablet  Commonly known as: GLUCOPHAGE XR  Indication: Type 2 Diabetes      Dose: 2,000 mg  Take 2,000 mg by mouth every morning  Refills: 0     nitroGLYcerin 0.4 MG sublingual tablet  Commonly known as: NITROSTAT      Dose: 0.4 mg  [NITROGLYCERIN (NITROSTAT) 0.4 MG SL TABLET] Place 0.4 mg under the tongue as needed.  Refills: 0     ondansetron 4 MG tablet  Commonly known as: ZOFRAN  Indication: Nausea and Vomiting      Dose: 4 mg  Take 4 mg by mouth every 8 hours as needed for nausea  Refills: 0     pantoprazole 20 MG EC tablet  Commonly known as: PROTONIX      Dose: 20 mg  Take 20 mg by mouth every other day  Refills: 0     polyethylene glycol 17 g packet  Commonly known as: MIRALAX      Dose: 17 g  Take 17 g by mouth daily as needed for constipation  Refills: 0     senna-docusate 8.6-50 MG tablet  Commonly known as: SENOKOT-S/PERICOLACE  Indication: Constipation      Dose: 2 tablet  Take 2 tablets by mouth 2 times daily as needed for constipation  Refills: 0     tamsulosin 0.4 MG capsule  Commonly known as: FLOMAX      Dose: 0.4 mg  Take 0.4 mg by mouth daily  Refills: 0           * This list has 2 medication(s) that are the same as other medications prescribed for you. Read the directions carefully, and ask your doctor or other care provider to review them with you.                STOP taking      mirtazapine 7.5 MG tablet  Commonly known as: REMERON  Stopped by: PAULINA Crooks CNP                 Case Management:  I have reviewed the care plan and  "MDS and do agree with the plan. Patient's desire to return to the community is currently living in a community environment. Information reviewed:  Medications, vital signs, orders, and nursing notes.    ROS:  4 point ROS including Respiratory, CV, GI and , other than that noted in the HPI,  is negative    Vitals:  /61   Pulse 86   Temp 98.6  F (37  C)   Resp 18   Ht 1.702 m (5' 7\")   Wt 63.6 kg (140 lb 3.2 oz)   SpO2 97%   BMI 21.96 kg/m    Body mass index is 21.96 kg/m .  Exam:  GENERAL APPEARANCE:  Alert, elderly, in no distress, laying in bed watching television  HEENT:  atraumatic, [place], EOM intact, moist mucus membranes  RESP:  non-labored breathing, lungs clear on auscultation, no respiratory distress, no cough  CV:  Rate regular, S1 S2 noted, no edema  ABDOMEN:  soft, non-distended, non-tender, bowel sounds active  M/S:  moves all extremities, strength and tone equal bilaterally, no calf pain, arthritic changes noted in hands  SKIN:  warm, dry, thin, fragile, no obvious rash, lesions, ulcerations or petechiae   NEURO:   Face is symmetric, examination of sensation by touch normal, follows and tracks, slow speech  PSYCH:  calm, cooperative      Lab/Diagnostic data:   Labs reviewed as per Epic and/or Care Everywhere.      ASSESSMENT/PLAN     Mild dementia without behavioral disturbance, psychotic disturbance, mood disturbance, or anxiety, unspecified dementia type (H)  Progressive. Lives in LTC for supportive cares.   -continue 24/7 supportive nursing cares    Current moderate episode of major depressive disorder, unspecified whether recurrent (H)  Denies mood changes today.  -discontinue mirtazepine 7.5 mg at bedtime  -continue celexa 20 mg daily  -monitor mood and behaviors    Age-related osteoporosis with current pathological fracture, vertebra(e), subsequent encounter for fracture with routine healing  Closed compression fracture of L2 lumbar vertebra in July 2023. Transitioned to [place] TLSO in " Dec 2023. Patient reports he does not like to use the brace. Denies pain today.   -continue alendronate 70 mg every Monday  -follow-up with spine clinic as recommended    Essential (primary) hypertension  Chronic. Started losartan on 12/4/23 and hydralazine decreased for concerns of hypotension. Today, /61 mmHg. HR 86 bpm.   -continue hydralazine 12.5 mg twice daily  -continue lorsartan 25 mg twice daily  -continue amlodipine 10 mg daily  -follow BPs and adjust medications as needed    Chronic diastolic CHF (congestive heart failure) (H)  No peripheral edema today.  -continue furosemide 20 mg daily  -monitor volume status  -follow weights    Type 2 diabetes mellitus with stage 3a chronic kidney disease, with long-term current use of insulin (H)  PAD (peripheral artery disease) (H24)  A1c 9.9 (6/12/23). Today, FBS 97 mg/dL.   -continue Jardiance 25 mg daily  -continue lantus 18 units daily  -continue aspart 6 units before meals  -continue aspart sliding scale insulin before meals  -continue metformin ER 2000 mg daily  -continue blood sugar checks QID  -check A1c on next lab day    Stage 3a chronic kidney disease (H)  Progressive. GFR 44, Cr 1.47, K level 5.3 (2/10/24).  -check BMP on next lab day    Sinus node dysfunction (H)  Per chart history. Denies concerns today.  -monitor clinically    Thrombocytopenia, unspecified (H24)  Per chart history. Platelet count 151 (2/10/23).  -recheck CBC on next lab day    Moderate protein-calorie malnutrition (H24)  Per chart history. Weight 140# (1/29/24). 135-139# last month.  -continue Boost supplement daily      45 minutes spent on the date of this encounter doing chart review, review labs, discussion with nursing staff, patient visit, and documentation.       Electronically signed by:  Dr. Mary Ann Thomson, DNP, APRN, AGNP-BC

## 2024-02-15 NOTE — TELEPHONE ENCOUNTER
[facility] Geriatrics Lab Note     Provider: PAULINA Law CNP  Facility: [facility] Facility Type:  LTC    Allergies   Allergen Reactions    Cilostazol Unknown     Other reaction(s): Tachycardia, tachycardia, chest pain, palpitation  Chest pressure/heart pounding      Acetaminophen Nausea and Vomiting     Other reaction(s): Nausea/vomiting  Other reaction(s): Nausea/vomiting    Amlodipine Unknown     Other reaction(s): Edema    Hydrocodone      Other reaction(s): Nausea/vomiting    Vicodin [Hydrocodone-Acetaminophen] Unknown       Labs Reviewed by provider: Heme 1, BMP, HgbA1c---from 2/14/24     Verbal Order/Direction given by Provider: Kayexelate PO 15g x1 dose on 2/15/24. Recheck BMP tomorrow 2/16/24.      Provider giving Order:  PAULINA Law CNP    Verbal Order given to: Ciarra([phone])    Du Torrez RN

## 2024-02-21 NOTE — TELEPHONE ENCOUNTER
[facility] NON-EMERGENT ENCOUNTER    Barak Berry is a 89 year old  (1/6/1932), call received today regarding:   Unwitnessed fall    Disposition  Nurse called to report that patient had a fall this morning.  Patient was unconscious when found on floor.  Blood sugar was checked and it was 69.  Nurse called EMS since unable to administer anything by mouth.  When EMS arrived blood sugar had dropped to 21.  EMS started IV and gave patient Dextrose.  Patient now back to baseline and blood sugar 311.  VS: 138/52, 68, O2 94% on RA.  Patient did sustain a bruise and skin tear to left elbow.  Advised nurse to place a nursing order for bedtime snack for patient as he receives insulin before meals.  Nurse also reports that follow up BMP was not done on 2/16.  Nurse called lab company and they only come out on Wednesdays so it will be pushed to 2/28.  This writer asked nurse to speak with manager regarding frequency of missed labs at facility.  Unacceptable practice considering this lab was a follow up for high potassium level.  Nurse stated she will follow up with DON.  Staff will continue to monitor patient and call back with any changes.      Requests  FYI      Electronically signed by:   Iraida Cortes RN

## 2024-02-26 NOTE — TELEPHONE ENCOUNTER
[facility] Geriatrics Triage Nurse Telephone Encounter    Provider: Rosenstein, Benjamin MD  Facility: [facility] Facility Type:  LTC    Caller: Marisela    Allergies:    Allergies   Allergen Reactions    Cilostazol Unknown     Other reaction(s): Tachycardia, tachycardia, chest pain, palpitation  Chest pressure/heart pounding      Acetaminophen Nausea and Vomiting     Other reaction(s): Nausea/vomiting  Other reaction(s): Nausea/vomiting    Amlodipine Unknown     Other reaction(s): Edema    Hydrocodone      Other reaction(s): Nausea/vomiting    Vicodin [Hydrocodone-Acetaminophen] Unknown        Reason for call: Today the nursing staff was calling to inform us that the patient has 2 orders for zofran. The patient has not used this medication in 2 months. Nursing is wondering if they should discontinue the medication.     Verbal Order/Direction given by Provider: Discontinue zofran due to non- use.     Provider giving Order:  Rosenstein, Benjamin MD    Verbal Order given to: Marisela Dueñas, RN

## 2024-02-29 NOTE — TELEPHONE ENCOUNTER
Telephone order given to nurse Ciarra Dueñas, RN on 2/29/2024 at 11:11 AM     ----- Message from Benjamin Rosenstein, MD sent at 2/29/2024 10:16 AM CST -----  Please call [facility] with the following  -Start saxagliptan 2.5mg daily for T2DM  -Repeat BMP next week for CKD    Thank you  Benjamin Rosenstein

## 2024-02-29 NOTE — TELEPHONE ENCOUNTER
PRIOR AUTHORIZATION DENIED    Medication: ONGLYZA 2.5 MG PO TABS    Insurance Company: [employer] Clinical [employer] - Phone [phone] Fax [phone]    Denial Date: 2/29/2024    Denial Reason(s): Patient needs to try and fail Januvia.     Appeal Information:

## 2024-02-29 NOTE — TELEPHONE ENCOUNTER
PA Initiation    Medication: ONGLYZA 2.5 MG PO TABS  Insurance Company: [employer] Clinical Review - Phone [phone] Fax [phone]  Pharmacy Filling the Rx: [facility] - [place], [place] - [address]  Filling Pharmacy Phone: [phone]  Filling Pharmacy Fax: [phone]  Start Date: 2/29/2024

## 2024-02-29 NOTE — TELEPHONE ENCOUNTER
Prior Authorization Retail Medication Request    Medication/Dose: Onglyza 2.5mg tablet  ICD code (if different than what is on RX):  E11.22  Previously Tried and Failed:  Jardiance, Novolog, Lantus, Metformin ER  Rationale:  Take 2.5mg by mouth daily    Insurance Name:  Medicare   Insurance ID:  [id number]    Secondary Insurance Name:  [employer] and [facility]  Secondary Insurance ID:  [id number]      Pharmacy Information (if different than what is on RX)  Name:  [facility] - [address], [facility], [address]   Phone: [phone]

## 2024-03-01 NOTE — TELEPHONE ENCOUNTER
[facility] Geriatrics Triage Nurse Telephone Encounter    Provider: Rosenstein, Benjamin MD  Facility: [facility] Facility Type:  [facility]      Call Back Number: [phone]    Allergies:    Allergies   Allergen Reactions    Cilostazol Unknown     Other reaction(s): Tachycardia, tachycardia, chest pain, palpitation  Chest pressure/heart pounding      Acetaminophen Nausea and Vomiting     Other reaction(s): Nausea/vomiting  Other reaction(s): Nausea/vomiting    Amlodipine Unknown     Other reaction(s): Edema    Hydrocodone      Other reaction(s): Nausea/vomiting    Vicodin [Hydrocodone-Acetaminophen] Unknown        Reason for call: Patient's Onglyza is not covered by insurance.  A prior authorization was attempted, but denied.  Insurance prefers Januvia.      Verbal Order/Direction given by Provider: Discontinue Onglyza.  Start Januvia 25mg daily.      Provider giving Order:  Rosenstein, Benjamin MD    Verbal Order given to: Samuel oTrrez, RN

## 2024-03-07 NOTE — TELEPHONE ENCOUNTER
[facility] Geriatrics Lab Note     Provider: PAULINA Law CNP  Facility: [facility] Facility Type:  LTC    Allergies   Allergen Reactions    Cilostazol Unknown     Other reaction(s): Tachycardia, tachycardia, chest pain, palpitation  Chest pressure/heart pounding      Acetaminophen Nausea and Vomiting     Other reaction(s): Nausea/vomiting  Other reaction(s): Nausea/vomiting    Amlodipine Unknown     Other reaction(s): Edema    Hydrocodone      Other reaction(s): Nausea/vomiting    Vicodin [Hydrocodone-Acetaminophen] Unknown       Labs Reviewed by provider: JOVANI     Verbal Order/Direction given by Provider:   JOVANI on 3/20/24; dx: CKD    Provider giving Order:  Rosenstein, Benjamin MD    Verbal Order given to: Monalisa Giron RN

## 2024-03-20 NOTE — TELEPHONE ENCOUNTER
[facility] Geriatrics Triage Nurse Telephone Encounter    Provider: PAULINA Law CNP  Facility: [facility] Facility Type:  LTC    Caller: Kevinpatricia  Call Back Number: [phone]    Allergies:    Allergies   Allergen Reactions    Cilostazol Unknown     Other reaction(s): Tachycardia, tachycardia, chest pain, palpitation  Chest pressure/heart pounding      Acetaminophen Nausea and Vomiting     Other reaction(s): Nausea/vomiting  Other reaction(s): Nausea/vomiting    Amlodipine Unknown     Other reaction(s): Edema    Hydrocodone      Other reaction(s): Nausea/vomiting    Vicodin [Hydrocodone-Acetaminophen] Unknown        Reason for call: Diastolic hypotension during therapy - 113/41, obtained manually. Pt reported dizziness during therapy. Nursing stated there have been other low DBPs in the past few days.    Verbal Order/Direction given by Provider:   1) Contact cardiology for direction on DBP  2) Obtain BP and pulse Qshift x 7 days and report to provider    Provider giving Order:  PAULINA Law CNP    Verbal Order given to: Kevin Giron RN

## 2024-03-21 NOTE — TELEPHONE ENCOUNTER
[facility] Geriatrics Lab Note     Provider: PAULINA Law CNP  Facility: [facility] Facility Type:  LTC    Allergies   Allergen Reactions    Cilostazol Unknown     Other reaction(s): Tachycardia, tachycardia, chest pain, palpitation  Chest pressure/heart pounding      Acetaminophen Nausea and Vomiting     Other reaction(s): Nausea/vomiting  Other reaction(s): Nausea/vomiting    Amlodipine Unknown     Other reaction(s): Edema    Hydrocodone      Other reaction(s): Nausea/vomiting    Vicodin [Hydrocodone-Acetaminophen] Unknown       Labs Reviewed by provider: JOVANI---from 3/20/24     Verbal Order/Direction given by Provider: Recheck BMP on 3/27/24(Dx: CKD)     Provider giving Order:  PAULINA Law CNP    Verbal Order given to: Yadi([phone])    Du Torrez RN

## 2024-03-25 NOTE — TELEPHONE ENCOUNTER
[facility] Geriatrics Triage Nurse Telephone Encounter    Provider: Rosenstein, Benjamin MD  Facility: [facility] Facility Type:  LT    Caller: Samuel  Call Back Number: [phone]    Allergies:    Allergies   Allergen Reactions    Cilostazol Unknown     Other reaction(s): Tachycardia, tachycardia, chest pain, palpitation  Chest pressure/heart pounding      Acetaminophen Nausea and Vomiting     Other reaction(s): Nausea/vomiting  Other reaction(s): Nausea/vomiting    Amlodipine Unknown     Other reaction(s): Edema    Hydrocodone      Other reaction(s): Nausea/vomiting    Vicodin [Hydrocodone-Acetaminophen] Unknown        Reason for call: Nurse is calling to report that patient's BP today was 101/31 and HR was 87.  Nurse states that the physical therapist noted an orthostatic drop in the patient's BP, however did not report that numbers to the nurse.  The patient also endorses chest pain at night for the last 2-3 nights, however he has not reported it to the staff.  According to the medical record, the patient's cardiologist gave parameters to hold his BP meds for a SBP <90, however that order did not get conveyed to the nursing facility.      Verbal Order/Direction given by Provider: Call and update cardiology about recent BP's and new c/o chest pain.  Also clarify BP med parameters.      Provider giving Order:  MEREDITH Cornejo    Verbal Order given to: Samuel Torrez RN

## 2024-03-28 NOTE — TELEPHONE ENCOUNTER
Message relayed to facility nurse, Yumiko.      ----- Message from PAULINA Kelly CNP sent at 3/28/2024 12:02 AM CDT -----  Triage - please see if patient has had adjustments to medications (diuretics specifically) since 3/25 when Cardiology was updated about acute concerns. His Kidney function has worsened, Please have staff encourage fluid. IF Cardiology did adjust medications - they need to be updated on these lab results.     Dr. Rosenstein - FYI Dr Becca Lenz APRN DNP

## 2024-03-29 NOTE — TELEPHONE ENCOUNTER
[facility] Geriatrics Triage Nurse Telephone Encounter    Provider: Rosenstein, Benjamin MD  Facility: [facility] Facility Type:  LTC    Caller: Samuel  Call Back Number: [phone]    Allergies:    Allergies   Allergen Reactions    Cilostazol Unknown     Other reaction(s): Tachycardia, tachycardia, chest pain, palpitation  Chest pressure/heart pounding      Acetaminophen Nausea and Vomiting     Other reaction(s): Nausea/vomiting  Other reaction(s): Nausea/vomiting    Amlodipine Unknown     Other reaction(s): Edema    Hydrocodone      Other reaction(s): Nausea/vomiting    Vicodin [Hydrocodone-Acetaminophen] Unknown        Reason for call: Nurse is calling to report that patient is c/o nausea today and refusing to eat breakfast.  Patient also endorses no energy.  Lung sounds are CTA.  Last BM was yesterday.  T=97.3, P=95(normally in the 60's-70's), R=18, BP=90/54, O2=97%RA, QM=581.      Verbal Order/Direction given by Provider: Hold Hydralazine until further notice.  Zofran 4mg Q 6 hours PRN.  Daily weights x 7 days.  Encourage fluids.  Hold Lasix on Sat-Sun-Mon.  Check Heme 1 and CMP on 4/1/24.      Provider giving Order:  Rosenstein, Benjamin MD    Verbal Order given to: Samuel Torrez RN

## 2024-04-01 PROBLEM — R62.7 ADULT FAILURE TO THRIVE: Status: RESOLVED | Noted: 2023-01-31 | Resolved: 2024-01-01

## 2024-04-01 NOTE — PROGRESS NOTES
[facility] GERIATRICS  Chief Complaint   Patient presents with    [facility] Regulatory     [place] Medical Record Number:  [id number]  Place of Service where encounter took place:  [facility] [place] () [[id number]]    HPI:    Barak Berry  is 89 year old (1932), former , olimpia (1932), with pmhx recent NSTEMI, prior CABG (2x), HFpEF, PAD, T2DM, falls who is being seen today for a federally mandated E/M visit.     Of note, prior to visit today, had been called last week due to concerns for poor appetite, nausea and generally not feeling well.  I was having weakness and low energy.  Afebrile, vitals relatively stable except for hypotension parental (unusual as he is typically significantly hypertensive).  With this, did discontinue his hydralazine, held Lasix for since he was having poor intake.  Labs for today ordered and pending.    Today, nursing staff additionally noted that he had a fall last night with associated injury to his elbow.  Seems to be having worsening incontinence of his balance.  Continues to feel weak and have poor appetite.  Vitals remained stable.  No episodes of hypoglycemia.    Today, he does say he has been feeling more weak.  Says it has been a rough week with his diabetes referring to having more frequent urination.  Says he has difficulty getting himself to the bathroom as well.  Does report having loose stools.  Initially says diarrhea, though then describes as loose and typically once to twice a day.  Does say is not eating very well his appetite is poor.  Also says he is not breathing very well describes that he gets fatigued and breathless quite easily.  Not short of breath at rest.  No chest pain.    Of note, he was evaluated in the emergency department 2/10/2024 for episode of chest pain for several minutes, though had resolved by time he was brought to the ED.  At that time, EKG without stigmata of ischemia.  Troponins mildly elevated,  but without significant delta.  He did have follow-up with cardiology 2/16/2024.  No medication changes made.  He did get sent for a repeat echocardiogram done 3/1/2024.  This showed LVEF 55%, severe left atrial enlargement, moderate aortic stenosis with a gradient of 25 mmHg.  Noted that aortic stenosis had worsened slightly from prior.  Recommendation to repeat echocardiogram likely yearly for monitoring.    ALLERGIES:Cilostazol, Acetaminophen, Amlodipine, Hydrocodone, and Vicodin [hydrocodone-acetaminophen]  PAST MEDICAL HISTORY:   Past Medical History:   Diagnosis Date    CAD (coronary artery disease)     s/p CABG 1999, 2011    Chronic diastolic congestive heart failure (H)     CKD (chronic kidney disease) stage 3, GFR 30-59 ml/min (H)     Diabetes mellitus, type 2 (H)     Dyslipidemia     Gastroesophageal reflux disease     Hypertension     PAD (peripheral artery disease) (H)     Recurrent falls     Sick sinus syndrome (H)     s/p pacemaker    Thrombocytopenia (H)      PAST SURGICAL HISTORY:   has a past surgical history that includes CABG, VEIN, SINGLE; CORONARY STENT PERCUT, INITIAL VESSEL; appendectomy; Arthroscopy knee (Left); Total Knee Arthroplasty (Left); Lumbar Laminectomy; Tonsillectomy; joint replacement; Cardiac surgery; and Colonoscopy w/wo Brush **Performed** (N/A, 1/3/2019).  FAMILY HISTORY: family history is not on file.  SOCIAL HISTORY:  reports that he quit smoking about 63 years ago. He has a 45 pack-year smoking history. He has never used smokeless tobacco. He reports that he does not currently use alcohol. He reports that he does not use drugs.    MEDICATIONS:  MED REC REQUIRED  Post Medication Reconciliation Status: medication reconcilation previously completed during another office visit         Review of your medicines            Accurate as of April 1, 2024  8:26 AM. If you have any questions, ask your nurse or doctor.                CONTINUE these medicines which have NOT CHANGED         Dose / Directions   acetaminophen 500 MG tablet  Commonly known as: TYLENOL      Dose: 500 mg  Take 500 mg by mouth every 4 hours as needed AND 1 TAB PO Q4H PRN  Refills: 0     alendronate 70 MG tablet  Commonly known as: FOSAMAX      Dose: 70 mg  Take 70 mg by mouth every 7 days Sit upright 30 min after  Refills: 0     amLODIPine 5 MG tablet  Commonly known as: NORVASC      Dose: 10 mg  Take 10 mg by mouth daily  Refills: 0     aspirin 81 MG EC tablet  Commonly known as: ASA      Dose: 81 mg  [ASPIRIN 81 MG EC TABLET] Take 81 mg by mouth daily.  Refills: 0     atorvastatin 40 MG tablet  Commonly known as: LIPITOR      Dose: 40 mg  Take 40 mg by mouth every evening  Refills: 0     bisacodyl 10 MG suppository  Commonly known as: DULCOLAX  Indication: Constipation      Dose: 10 mg  Place 10 mg rectally daily as needed for constipation  Refills: 0     carvedilol 25 MG tablet  Commonly known as: COREG      Dose: 25 mg  Take 25 mg by mouth 2 times daily  Refills: 0     citalopram 10 MG tablet  Commonly known as: celeXA      Dose: 20 mg  Take 20 mg by mouth daily  Refills: 0     clopidogrel 75 MG tablet  Commonly known as: PLAVIX      Dose: 1 tablet  Take 1 tablet by mouth every morning  Refills: 0     empagliflozin 10 MG Tabs tablet  Commonly known as: JARDIANCE      Dose: 25 mg  Take 25 mg by mouth daily  Refills: 0     furosemide 20 MG tablet  Commonly known as: LASIX      Dose: 20 mg  Take 20 mg by mouth  Refills: 0     hydrALAZINE 25 MG tablet  Commonly known as: APRESOLINE      Dose: 12.5 mg  Take 12.5 mg by mouth 2 times daily  Refills: 0     HYDROmorphone 2 MG tablet  Commonly known as: DILAUDID  Used for: Closed compression fracture of L2 lumbar vertebra with routine healing, subsequent encounter      Dose: 2 mg  Take 1 tablet (2 mg) by mouth every 8 hours as needed for pain  Quantity: 90 tablet  Refills: 0     ICaps AREDS Formula Tabs      Dose: 1 capsule  [VITAMINS  A,C,E-ZINC-COPPER (ICAPS AREDS) 7160-459-100  UNIT-MG-UNIT TBEC] Take 1 capsule by mouth daily.  Refills: 0     * insulin aspart 100 UNIT/ML pen  Commonly known as: NovoLOG PEN  Indication: Type 2 Diabetes      Dose: 6 Units  Inject 6 Units Subcutaneous 3 times daily (with meals) (Hold for BG less than 200)  Refills: 0     * insulin aspart 100 UNIT/ML pen  Commonly known as: NovoLOG PEN  Indication: Type 2 Diabetes      Inject Subcutaneous 3 times daily (with meals) Per sliding scale:      250-349= 2 units  350-449= 3 units  450 or greater= 4 units  Refills: 0     insulin glargine 100 UNIT/ML pen  Commonly known as: LANTUS PEN  Indication: Type 2 Diabetes  Used for: Type 2 diabetes mellitus with chronic kidney disease (H)      Dose: 18 Units  Inject 18 Units Subcutaneous every morning  Quantity: 15 mL  Refills: 3     isosorbide mononitrate 30 MG 24 hr tablet  Commonly known as: IMDUR      Dose: 120 mg  Take 120 mg by mouth daily  Refills: 0     losartan 25 MG tablet  Commonly known as: COZAAR  Indication: High Blood Pressure Disorder      Dose: 12.5 mg  Take 12.5 mg by mouth 2 times daily  Refills: 0     magnesium chloride 535 (64 Mg) MG Tbec CR tablet      Dose: 535 mg  Take 535 mg by mouth daily  Refills: 0     metFORMIN 500 MG 24 hr tablet  Commonly known as: GLUCOPHAGE XR  Indication: Type 2 Diabetes      Dose: 2,000 mg  Take 2,000 mg by mouth every morning  Refills: 0     nitroGLYcerin 0.4 MG sublingual tablet  Commonly known as: NITROSTAT      Dose: 0.4 mg  [NITROGLYCERIN (NITROSTAT) 0.4 MG SL TABLET] Place 0.4 mg under the tongue as needed.  Refills: 0     ondansetron 4 MG tablet  Commonly known as: ZOFRAN  Indication: Nausea and Vomiting      Take by mouth every 6 hours as needed for nausea  Refills: 0     pantoprazole 20 MG EC tablet  Commonly known as: PROTONIX      Dose: 20 mg  Take 20 mg by mouth every other day  Refills: 0     polyethylene glycol 17 g packet  Commonly known as: MIRALAX      Dose: 17 g  Take 17 g by mouth daily as needed for  "constipation  Refills: 0     senna-docusate 8.6-50 MG tablet  Commonly known as: SENOKOT-S/PERICOLACE  Indication: Constipation      Dose: 2 tablet  Take 2 tablets by mouth 2 times daily as needed for constipation  Refills: 0     sitagliptin 25 MG tablet  Commonly known as: JANUVIA  Indication: Type 2 Diabetes      Dose: 25 mg  Take 25 mg by mouth daily  Refills: 0     tamsulosin 0.4 MG capsule  Commonly known as: FLOMAX      Dose: 0.4 mg  Take 0.4 mg by mouth daily  Refills: 0           * This list has 2 medication(s) that are the same as other medications prescribed for you. Read the directions carefully, and ask your doctor or other care provider to review them with you.                 Case Management:  I have reviewed the care plan and MDS and do agree with the plan. Patient's desire to return to the community is not present. Information reviewed:  Medications, vital signs, orders, and nursing notes.    Vitals:  /74   Pulse 65   Temp 97.7  F (36.5  C)   Resp 18   Ht 1.702 m (5' 7\")   Wt 54.7 kg (120 lb 9.6 oz)   SpO2 93%   BMI 18.89 kg/m    Body mass index is 18.89 kg/m .  Exam:    GENERAL APPEARANCE: Laying in bed comfortably, NAD  HENT:  Moderate temporal atorphy, Andreafski, fair dentition  EYES:  Conjunctiva clear, anicteric, EOMI, PERRL  PULM  Normal WOB on RA, lungs CTAB with soft bibasilar crackles  CV:  RRR, II/VI systolic murmurs; no LE edema  ABDOMEN: Abdomen soft, not tender, not distended, BS normal and active throughout   M/S: Generalized atrophy  SKIN: Skin tears of left elbow  NEURO: Alert, answering questions appropriately, normal thought processes; CN II-XII grossly intact  PSYCH: Mood decreased with congruent affect, insight/judgment intact    Lab/Diagnostic data:   Recent labs and imaging in [facility] reviewed by me today with other labs pending    ASSESSMENT/PLAN      ICD-10-CM    1. Frailty  R54       2. Weight loss  R63.4       3. Frequent falls  R29.6       4. 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 Chronic dyspnea  R06.09       6. Moderate protein-calorie malnutrition (H24)  E44.0       7. NYHA class 3 heart failure with preserved ejection fraction (H)  I50.30       8. Nonrheumatic aortic valve stenosis  I35.0       9. History of non-ST elevation myocardial infarction (NSTEMI)  I25.2       10. Coronary artery disease involving coronary bypass graft of native heart with unstable angina pectoris (H)  I25.700       11. Type 2 diabetes mellitus with stage 3b chronic kidney disease, with long-term current use of insulin (H)  E11.22     N18.32     Z79.4       12. Incontinence of feces, unspecified fecal incontinence type  R15.9         In review today, he has had slow but steady progression of fatigue, weakness, and dyspnea over the past 6 months or so.  Worse in the past week.  I suspect this is related to progression of heart failure NYHA class III potentially class IV.  Unclear if mild progression of aortic stenosis is related.  He has been complaining of increased urinary frequency and loose stool, the blood sugars have been relatively well-controlled, likely represents overall incontinence.  He continues to have frequent falls.  Additionally poor intake with associated and steady weight loss, down 20 pounds since the first of the year.    As his blood pressures have been well-controlled and due to possible related side effects, will discontinue hydralazine.  Decrease losartan to 12.5 mg daily, which may assist with borderline hyperkalemia.  Additionally in case metformin causing GI symptoms, particularly with his current renal function, decrease metformin to 500 mg twice daily.    Awaits labs that were previously ordered.  Given all these, and pending course and lab findings, would certainly revisit for discussion of consideration for hospice.    Orders:  [x] Discontinue hydralazine,  decrease metformin to 500mg BID  [x] Change losartan to 12.5 daily (from BID)  [x] Await labs    Electronically signed by:    Benjamin Rosenstein, MD, MA  [facility]     This note was completed with the assistance of dictation software. Typos and word substitution-errors are expected and unintended.      51 MINUTES SPENT BY ME on the date of service doing chart review, history, exam, documentation & further activities per the note.

## 2024-04-01 NOTE — LETTER
2024        RE: Barak Berry  [address] [place] [address]        M [facility] GERIATRICS  Chief Complaint   Patient presents with     senior living Regulatory     [place] Medical Record Number:  [id number]  Place of Service where encounter took place:  [facility] () [[id number]]    HPI:    Barak Berry  is 89 year old (1932), former , olimpia (1932), with pmhx recent NSTEMI, prior CABG (2x), HFpEF, PAD, T2DM, falls who is being seen today for a federally mandated E/M visit.     Of note, prior to visit today, had been called last week due to concerns for poor appetite, nausea and generally not feeling well.  I was having weakness and low energy.  Afebrile, vitals relatively stable except for hypotension parental (unusual as he is typically significantly hypertensive).  With this, did discontinue his hydralazine, held Lasix for since he was having poor intake.  Labs for today ordered and pending.    Today, nursing staff additionally noted that he had a fall last night with associated injury to his elbow.  Seems to be having worsening incontinence of his balance.  Continues to feel weak and have poor appetite.  Vitals remained stable.  No episodes of hypoglycemia.    Today, he does say he has been feeling more weak.  Says it has been a rough week with his diabetes referring to having more frequent urination.  Says he has difficulty getting himself to the bathroom as well.  Does report having loose stools.  Initially says diarrhea, though then describes as loose and typically once to twice a day.  Does say is not eating very well his appetite is poor.  Also says he is not breathing very well describes that he gets fatigued and breathless quite easily.  Not short of breath at rest.  No chest pain.    Of note, he was evaluated in the emergency department 2/10/2024 for episode of chest pain for several minutes, though had resolved by time he was  brought to the ED.  At that time, EKG without stigmata of ischemia.  Troponins mildly elevated, but without significant delta.  He did have follow-up with cardiology 2/16/2024.  No medication changes made.  He did get sent for a repeat echocardiogram done 3/1/2024.  This showed LVEF 55%, severe left atrial enlargement, moderate aortic stenosis with a gradient of 25 mmHg.  Noted that aortic stenosis had worsened slightly from prior.  Recommendation to repeat echocardiogram likely yearly for monitoring.    ALLERGIES:Cilostazol, Acetaminophen, Amlodipine, Hydrocodone, and Vicodin [hydrocodone-acetaminophen]  PAST MEDICAL HISTORY:   Past Medical History:   Diagnosis Date     CAD (coronary artery disease)     s/p CABG 1999, 2011     Chronic diastolic congestive heart failure (H)      CKD (chronic kidney disease) stage 3, GFR 30-59 ml/min (H)      Diabetes mellitus, type 2 (H)      Dyslipidemia      Gastroesophageal reflux disease      Hypertension      PAD (peripheral artery disease) (H)      Recurrent falls      Sick sinus syndrome (H)     s/p pacemaker     Thrombocytopenia (H)      PAST SURGICAL HISTORY:   has a past surgical history that includes CABG, VEIN, SINGLE; CORONARY STENT PERCUT, INITIAL VESSEL; appendectomy; Arthroscopy knee (Left); Total Knee Arthroplasty (Left); Lumbar Laminectomy; Tonsillectomy; joint replacement; Cardiac surgery; and Colonoscopy w/wo Brush **Performed** (N/A, 1/3/2019).  FAMILY HISTORY: family history is not on file.  SOCIAL HISTORY:  reports that he quit smoking about 63 years ago. He has a 45 pack-year smoking history. He has never used smokeless tobacco. He reports that he does not currently use alcohol. He reports that he does not use drugs.    MEDICATIONS:  MED REC REQUIRED  Post Medication Reconciliation Status: medication reconcilation previously completed during another office visit         Review of your medicines            Accurate as of April 1, 2024  8:26 AM. If you have any  questions, ask your nurse or doctor.                CONTINUE these medicines which have NOT CHANGED        Dose / Directions   acetaminophen 500 MG tablet  Commonly known as: TYLENOL      Dose: 500 mg  Take 500 mg by mouth every 4 hours as needed AND 1 TAB PO Q4H PRN  Refills: 0     alendronate 70 MG tablet  Commonly known as: FOSAMAX      Dose: 70 mg  Take 70 mg by mouth every 7 days Sit upright 30 min after  Refills: 0     amLODIPine 5 MG tablet  Commonly known as: NORVASC      Dose: 10 mg  Take 10 mg by mouth daily  Refills: 0     aspirin 81 MG EC tablet  Commonly known as: ASA      Dose: 81 mg  [ASPIRIN 81 MG EC TABLET] Take 81 mg by mouth daily.  Refills: 0     atorvastatin 40 MG tablet  Commonly known as: LIPITOR      Dose: 40 mg  Take 40 mg by mouth every evening  Refills: 0     bisacodyl 10 MG suppository  Commonly known as: DULCOLAX  Indication: Constipation      Dose: 10 mg  Place 10 mg rectally daily as needed for constipation  Refills: 0     carvedilol 25 MG tablet  Commonly known as: COREG      Dose: 25 mg  Take 25 mg by mouth 2 times daily  Refills: 0     citalopram 10 MG tablet  Commonly known as: celeXA      Dose: 20 mg  Take 20 mg by mouth daily  Refills: 0     clopidogrel 75 MG tablet  Commonly known as: PLAVIX      Dose: 1 tablet  Take 1 tablet by mouth every morning  Refills: 0     empagliflozin 10 MG Tabs tablet  Commonly known as: JARDIANCE      Dose: 25 mg  Take 25 mg by mouth daily  Refills: 0     furosemide 20 MG tablet  Commonly known as: LASIX      Dose: 20 mg  Take 20 mg by mouth  Refills: 0     hydrALAZINE 25 MG tablet  Commonly known as: APRESOLINE      Dose: 12.5 mg  Take 12.5 mg by mouth 2 times daily  Refills: 0     HYDROmorphone 2 MG tablet  Commonly known as: DILAUDID  Used for: Closed compression fracture of L2 lumbar vertebra with routine healing, subsequent encounter      Dose: 2 mg  Take 1 tablet (2 mg) by mouth every 8 hours as needed for pain  Quantity: 90 tablet  Refills:  0     ICaps AREDS Formula Tabs      Dose: 1 capsule  [VITAMINS  A,C,E-ZINC-COPPER (ICAPS AREDS) 7,160-113-100 UNIT-MG-UNIT TBEC] Take 1 capsule by mouth daily.  Refills: 0     * insulin aspart 100 UNIT/ML pen  Commonly known as: NovoLOG PEN  Indication: Type 2 Diabetes      Dose: 6 Units  Inject 6 Units Subcutaneous 3 times daily (with meals) (Hold for BG less than 200)  Refills: 0     * insulin aspart 100 UNIT/ML pen  Commonly known as: NovoLOG PEN  Indication: Type 2 Diabetes      Inject Subcutaneous 3 times daily (with meals) Per sliding scale:      250-349= 2 units  350-449= 3 units  450 or greater= 4 units  Refills: 0     insulin glargine 100 UNIT/ML pen  Commonly known as: LANTUS PEN  Indication: Type 2 Diabetes  Used for: Type 2 diabetes mellitus with chronic kidney disease (H)      Dose: 18 Units  Inject 18 Units Subcutaneous every morning  Quantity: 15 mL  Refills: 3     isosorbide mononitrate 30 MG 24 hr tablet  Commonly known as: IMDUR      Dose: 120 mg  Take 120 mg by mouth daily  Refills: 0     losartan 25 MG tablet  Commonly known as: COZAAR  Indication: High Blood Pressure Disorder      Dose: 12.5 mg  Take 12.5 mg by mouth 2 times daily  Refills: 0     magnesium chloride 535 (64 Mg) MG Tbec CR tablet      Dose: 535 mg  Take 535 mg by mouth daily  Refills: 0     metFORMIN 500 MG 24 hr tablet  Commonly known as: GLUCOPHAGE XR  Indication: Type 2 Diabetes      Dose: 2,000 mg  Take 2,000 mg by mouth every morning  Refills: 0     nitroGLYcerin 0.4 MG sublingual tablet  Commonly known as: NITROSTAT      Dose: 0.4 mg  [NITROGLYCERIN (NITROSTAT) 0.4 MG SL TABLET] Place 0.4 mg under the tongue as needed.  Refills: 0     ondansetron 4 MG tablet  Commonly known as: ZOFRAN  Indication: Nausea and Vomiting      Take by mouth every 6 hours as needed for nausea  Refills: 0     pantoprazole 20 MG EC tablet  Commonly known as: PROTONIX      Dose: 20 mg  Take 20 mg by mouth every other day  Refills: 0     polyethylene  "glycol 17 g packet  Commonly known as: MIRALAX      Dose: 17 g  Take 17 g by mouth daily as needed for constipation  Refills: 0     senna-docusate 8.6-50 MG tablet  Commonly known as: SENOKOT-S/PERICOLACE  Indication: Constipation      Dose: 2 tablet  Take 2 tablets by mouth 2 times daily as needed for constipation  Refills: 0     sitagliptin 25 MG tablet  Commonly known as: JANUVIA  Indication: Type 2 Diabetes      Dose: 25 mg  Take 25 mg by mouth daily  Refills: 0     tamsulosin 0.4 MG capsule  Commonly known as: FLOMAX      Dose: 0.4 mg  Take 0.4 mg by mouth daily  Refills: 0           * This list has 2 medication(s) that are the same as other medications prescribed for you. Read the directions carefully, and ask your doctor or other care provider to review them with you.                 Case Management:  I have reviewed the care plan and MDS and do agree with the plan. Patient's desire to return to the community is not present. Information reviewed:  Medications, vital signs, orders, and nursing notes.    Vitals:  /74   Pulse 65   Temp 97.7  F (36.5  C)   Resp 18   Ht 1.702 m (5' 7\")   Wt 54.7 kg (120 lb 9.6 oz)   SpO2 93%   BMI 18.89 kg/m    Body mass index is 18.89 kg/m .  Exam:    GENERAL APPEARANCE: Laying in bed comfortably, NAD  HENT:  Moderate temporal atorphy, [place], fair dentition  EYES:  Conjunctiva clear, anicteric, EOMI, PERRL  PULM  Normal WOB on RA, lungs CTAB with soft bibasilar crackles  CV:  RRR, II/VI systolic murmurs; no LE edema  ABDOMEN: Abdomen soft, not tender, not distended, BS normal and active throughout   M/S: Generalized atrophy  SKIN: Skin tears of left elbow  NEURO: Alert, answering questions appropriately, normal thought processes; CN II-XII grossly intact  PSYCH: Mood decreased with congruent affect, insight/judgment intact    Lab/Diagnostic data:   Recent labs and imaging in Psychiatric reviewed by me today with other labs pending    ASSESSMENT/PLAN      ICD-10-CM    1. " Frailty  R54       2. Weight loss  R63.4       3. Frequent falls  R29.6       4. Mild dementia without behavioral disturbance, psychotic disturbance, mood disturbance, or anxiety, unspecified dementia type (H)  F03.A0       5. Chronic dyspnea  R06.09       6. Moderate protein-calorie malnutrition (H24)  E44.0       7. NYHA class 3 heart failure with preserved ejection fraction (H)  I50.30       8. Nonrheumatic aortic valve stenosis  I35.0       9. History of non-ST elevation myocardial infarction (NSTEMI)  I25.2       10. Coronary artery disease involving coronary bypass graft of native heart with unstable angina pectoris (H)  I25.700       11. Type 2 diabetes mellitus with stage 3b chronic kidney disease, with long-term current use of insulin (H)  E11.22     N18.32     Z79.4       12. Incontinence of feces, unspecified fecal incontinence type  R15.9         In review today, he has had slow but steady progression of fatigue, weakness, and dyspnea over the past 6 months or so.  Worse in the past week.  I suspect this is related to progression of heart failure NYHA class III potentially class IV.  Unclear if mild progression of aortic stenosis is related.  He has been complaining of increased urinary frequency and loose stool, the blood sugars have been relatively well-controlled, likely represents overall incontinence.  He continues to have frequent falls.  Additionally poor intake with associated and steady weight loss, down 20 pounds since the first of the year.    As his blood pressures have been well-controlled and due to possible related side effects, will discontinue hydralazine.  Decrease losartan to 12.5 mg daily, which may assist with borderline hyperkalemia.  Additionally in case metformin causing GI symptoms, particularly with his current renal function, decrease metformin to 500 mg twice daily.    Awaits labs that were previously ordered.  Given all these, and pending course and lab findings, would  certainly revisit for discussion of consideration for hospice.    Orders:  [x] Discontinue hydralazine, decrease metformin to 500mg BID  [x] Change losartan to 12.5 daily (from BID)  [x] Await labs    Electronically signed by:    Benjamin Rosenstein, MD, MA  [facility]     This note was completed with the assistance of dictation software. Typos and word substitution-errors are expected and unintended.      51 MINUTES SPENT BY ME on the date of service doing chart review, history, exam, documentation & further activities per the note.            Sincerely,        Benjamin Rosenstein, MD

## 2024-04-02 NOTE — TELEPHONE ENCOUNTER
[facility] Geriatrics Triage Nurse Telephone Encounter    Provider: PAULINA Law CNP  Facility: [facility] Facility Type:  LTC    Caller: Samuel  Call Back Number: [phone]    Allergies:    Allergies   Allergen Reactions    Cilostazol Unknown     Other reaction(s): Tachycardia, tachycardia, chest pain, palpitation  Chest pressure/heart pounding      Acetaminophen Nausea and Vomiting     Other reaction(s): Nausea/vomiting  Other reaction(s): Nausea/vomiting    Amlodipine Unknown     Other reaction(s): Edema    Hydrocodone      Other reaction(s): Nausea/vomiting    Vicodin [Hydrocodone-Acetaminophen] Unknown        Reason for call: Pt c/o increased urinary frequency, pain and burning with urination as well as a strong odor.     Verbal Order/Direction given by Provider:   - UA/UC for dysuria  - Increase Lantus to 20u daily in the morning    Provider giving Order:  Rosenstein, Benjamin MD    Verbal Order given to: Jenna Martin RN

## 2024-04-03 NOTE — TELEPHONE ENCOUNTER
[facility] Geriatrics Triage Nurse Telephone Encounter    Provider: PAULINA Law CNP  Facility: [facility]  Facility Type:  LTC    Caller: Samuel  Call Back Number: [phone]    Allergies:    Allergies   Allergen Reactions    Cilostazol Unknown     Other reaction(s): Tachycardia, tachycardia, chest pain, palpitation  Chest pressure/heart pounding      Acetaminophen Nausea and Vomiting     Other reaction(s): Nausea/vomiting  Other reaction(s): Nausea/vomiting    Amlodipine Unknown     Other reaction(s): Edema    Hydrocodone      Other reaction(s): Nausea/vomiting    Vicodin [Hydrocodone-Acetaminophen] Unknown        Reason for call: Nursing is calling this am to report that the patient had a fall last night and this morning the patient was c/o of back pain. Dilaudid 2mg prn was given @ 9am and the patient was crying out in pain @ 11am asking for more medications. The patient has recently been declining and has been an A1 with transfers now. Currently has an order for dilaudid 2mg q8h prn and tylenol 1000mg tid.     Verbal Order/Direction given by Provider: Okay to give dilaudid 2mg po x1 now. Then change the Dilaudid to 2mg q4h prn x 3 days then back to 2mg q8h prn.   Obtain and sacral, Rt and left hip Xray d/t fall with pain.    Provider giving Order:  PAULINA Law CNP    Verbal Order given to: Samuel Dueñas RN

## 2024-04-04 NOTE — TELEPHONE ENCOUNTER
[facility] Geriatrics Lab Note     Provider: Rosenstein, Benjamin MD  Facility: [facility] Facility Type:  C    Allergies   Allergen Reactions    Cilostazol Unknown     Other reaction(s): Tachycardia, tachycardia, chest pain, palpitation  Chest pressure/heart pounding      Acetaminophen Nausea and Vomiting     Other reaction(s): Nausea/vomiting  Other reaction(s): Nausea/vomiting    Amlodipine Unknown     Other reaction(s): Edema    Hydrocodone      Other reaction(s): Nausea/vomiting    Vicodin [Hydrocodone-Acetaminophen] Unknown       Labs Reviewed by provider: Heme 1, BMP---from 4/3/24     Verbal Order/Direction given by Provider: -Encourage fluids   -Hold furosemide, metformin, empagliflozin, losartan, alendronate, sitagliptin until further notice   -Start sliding scale insulin as follows   -- 2 units for BG > 250, 3 units for BG > 350, 4 units for BG > 400   -Kayexelate 15g x 1   -Repeat BMP, CBC on 4/10/24.      Provider giving Order:  Rosenstein, Benjamin MD    Verbal Order given to: Samuel([phone])    Du Torrez RN

## 2024-04-08 ENCOUNTER — LAB REQUISITION (OUTPATIENT)
Dept: LAB | Facility: CLINIC | Age: 89
End: 2024-04-08
Payer: MEDICARE

## 2024-04-08 DIAGNOSIS — N18.30 CHRONIC KIDNEY DISEASE, STAGE 3 UNSPECIFIED (H): ICD-10-CM

## 2024-06-05 NOTE — PROGRESS NOTES
Care Management Discharge Note    Discharge Date: 06/15/2023       Discharge Disposition: Skilled Nursing Facility, Long Term Care    [facility]   [address] [place] [address]  Admissions PH: [phone]  FAX: [phone]  RN to RN PH: [phone]    Discharge Services: NA    Discharge DME: NA    Discharge Transportation: pt's son Aamir (PH: [phone]) will provide transportation     Private pay costs discussed: transportation costs    Does the patient's insurance plan have a 3 day qualifying hospital stay waiver?  No    PAS Confirmation Code: NA  Patient/family educated on Medicare website which has current facility and service quality ratings: NA     Education Provided on the Discharge Plan: yes  Persons Notified of Discharge Plans:   Patient/Family in Agreement with the Plan: yes    Handoff Referral Completed: Yes    Additional Information:    Pt reports to RN that he will be able to tolerate riding in his son's vehicle to return to [facility]. MONISHA alerted Marisela with [facility] that pt will likely arrive around 1:00PM. MONISHA will fax discharge orders as soon as they are available.     Update 11:40AM: MONISHA faxed completed discharge orders to [facility]. MONISHA spoke with pt's son and he will be to the hospital around 1:30 for pickup. [facility]ir needs [facility]  pt's back brace.     JOSEF Peters, LICSW  6B    Phone: [phone]  Pager: [phone]       no